# Patient Record
Sex: FEMALE | Race: WHITE | NOT HISPANIC OR LATINO | Employment: OTHER | URBAN - METROPOLITAN AREA
[De-identification: names, ages, dates, MRNs, and addresses within clinical notes are randomized per-mention and may not be internally consistent; named-entity substitution may affect disease eponyms.]

---

## 2022-05-31 ENCOUNTER — HOSPITAL ENCOUNTER (OUTPATIENT)
Dept: GENERAL RADIOLOGY | Age: 67
Discharge: HOME | End: 2022-05-31
Attending: ANESTHESIOLOGY

## 2022-05-31 DIAGNOSIS — G89.29 ANKLE PAIN, CHRONIC: ICD-10-CM

## 2022-05-31 DIAGNOSIS — M25.579 ANKLE PAIN, CHRONIC: ICD-10-CM

## 2022-05-31 PROCEDURE — 73630 X-RAY EXAM OF FOOT: CPT

## 2022-05-31 PROCEDURE — 73610 X-RAY EXAM OF ANKLE: CPT

## 2024-11-19 NOTE — PROGRESS NOTES
FAMILY MEDICINE CLINIC NOTE    HPI  Mandi Moran is a 69 year old female presenting for     #DM  -Hba1c: Indicated  -Microalbuminuria: Indicated  -B12:   -Pneumonia vaccine: Defers for now  -HepB:  -Eye exam: Will need - will set up in future  -Diabetic foot exam: 11/20/24 Bilateral barefoot skin diabetic exam completed. Visualized feet and the appearance is normal. Bilateral monofilament/sensation of both feet is decreased throughout the bilateral feet except in the left 3rd, 4th and 5th digits. Pulsation pedal pulse exam of both lower legs/feet is normal.  -Current medications:metformin 1000 mg twice daily, basaglar 10 U nightly  -Blood sugars:  -AM:      -Noon:   -PM:      -hypoglycemia:    #Diabetic neuropathy  -gabapentin 800 mg in the morning 400 mg in the afternoon and 400 mg at night     #HLD  #Hypertriglyceridemia   -fenofibrate 145 mg daily  -atorvastatin - had achiness, not taking  -will switch to rosuvastatin    #HTN  -amlodipine 5 mg nightly     #Gout   -allopurinol 200 mg in the morning, 100 mg at night    #Hip pain  -1 week ago  -reports left posterior back  -pain radiates down the left lower extremity  -has had arthritis in the spine   -interested in physical therapy     #Abdominal discomfort  -pantoprazole 40 mg  -has seen GI for EGD and colonoscopy  -pt reports unremarkable EGD  -pt reports polyps and hemorrhoids for colonoscopy  -no reports available     #COPD  -albuterol 2 puffs every morning   -still smoking    #Cigarette smoker  -1.5 ppd     #CKD  -last CMP 9/2023  -taking meloxicam - advised to discontinue         ROS  GENERAL: No fever/chills, no recent weight loss  HEENT: No visual changes, no changes in hearing, no sore throats  NECK: No pain, no swelling  RESP: No cough, no SOB  CV: No chest pain, no palpitations  GI: No abd pain, no N/V/D  MSK: No edema  SKIN: No new rashes  NEURO: No numbness, no tingling, no headaches    HEALTH MAINTENANCE  Health Maintenance Topics with due status:  Overdue       Topic Date Due    Colorectal Cancer Screening Never done    Mammogram Never done    Zoster Vaccines Never done    DEXA Scan Never done    Pneumococcal Vaccine: 65+ Years Never done    MA Annual Health Assessment Never done    Annual Depression Screening Never done    Fall Risk Screening (Annual) Never done    COVID-19 Vaccine 09/01/2024    Influenza Vaccine Never done       ALLERGIES  Allergies[1]    MEDICATIONS  Current Outpatient Medications   Medication Sig Dispense Refill    pantoprazole 40 MG Oral Tab EC TAKE 1 TABLET BY MOUTH EVERY MORNING BEFORE BREAKFAST FOR 90 DAYS      metFORMIN 500 MG Oral Tab 2 tablets (1,000 mg total).      Magnesium Gluconate 250 MG Oral Tab 250 mg.      allopurinol 100 MG Oral Tab TAKE 2 TABLETS BY MOUTH EVERY MORNING AND 1 TABLET EVERY EVENING      amLODIPine 5 MG Oral Tab 1 tablet (5 mg total).      fenofibrate 145 MG Oral Tab Take 1 tablet (145 mg total) by mouth daily.      insulin glargine (BASAGLAR KWIKPEN) 100 UNIT/ML Subcutaneous Solution Pen-injector 10 Units.      Cholecalciferol (VITAMIN D3) 250 MCG (95866 UT) Oral Cap 250 mcg.      Ascorbic Acid (VITAMIN C) 500 MG Oral Cap 500 mg.      B Complex Vitamins (PA B-COMPLEX WITH B-12 OR) PO, Daily, 0 Refill(s)      albuterol 108 (90 Base) MCG/ACT Inhalation Aero Soln every 28 days. FOR 21 DAYS IN, THEN REMOVE FOR 7 DAYS      acetaminophen (TYLENOL EXTRA STRENGTH) 500 MG Oral Tab 2 tablets (1,000 mg total).      rosuvastatin 5 MG Oral Tab Take 1 tablet (5 mg total) by mouth nightly. 90 tablet 1    APPLE CIDER VINEGAR OR Take 450 mg by mouth.      fluticasone-salmeterol (ADVAIR DISKUS) 250-50 MCG/ACT Inhalation Aerosol Powder, Breath Activated Inhale 1 puff into the lungs 2 (two) times daily. 60 each 3    gabapentin 400 MG Oral Cap Gabapentin 800 mg in the morning, 400 mg in the afternoon, 400 mg at night 360 capsule 3       ACTIVE PROBLEMS  Patient Active Problem List   Diagnosis    Abdominal discomfort    Type 2  diabetes mellitus with stage 3a chronic kidney disease, without long-term current use of insulin (HCC)    Hyperlipidemia    Primary hypertension    Stage 3a chronic kidney disease (HCC)    Chronic obstructive pulmonary disease (HCC)    Chronic gout without tophus    Diabetic polyneuropathy associated with type 2 diabetes mellitus (HCC)    Pain of left hip    Cigarette smoker    Health maintenance examination       PAST MEDICAL HISTORY  Past Medical History:    Arthritis    COPD (chronic obstructive pulmonary disease) (HCC)    Diabetes (HCC)    Essential hypertension    Gout    Hyperlipidemia       PAST SOCIAL HISTORY  Social History     Socioeconomic History    Marital status:      Spouse name: Not on file    Number of children: Not on file    Years of education: Not on file    Highest education level: Not on file   Occupational History    Not on file   Tobacco Use    Smoking status: Every Day     Current packs/day: 1.50     Average packs/day: 1 pack/day for 58.9 years (61.3 ttl pk-yrs)     Types: Cigarettes     Start date: 1966    Smokeless tobacco: Current   Vaping Use    Vaping status: Some Days    Substances: Nicotine   Substance and Sexual Activity    Alcohol use: Yes     Comment: A couple times a year    Drug use: Never    Sexual activity: Not Currently     Partners: Male   Other Topics Concern    Not on file   Social History Narrative    Relationships: . Brother - Aneesh Bullard    Children:     Pets:     School:     Work:     Origin:     Interests:     Spiritual:          Social Drivers of Health     Financial Resource Strain: Not on File (8/2/2024)    Received from [facility]    Financial Resource Strain     Financial Resource Strain: 0   Food Insecurity: Not at Risk (8/2/2024)    Received from [facility]    Food Insecurity     Food: 1   Transportation Needs: Not at Risk (8/2/2024)    Received from [facility]    Transportation Needs     Transportation: 1   Physical Activity: Not on File (10/6/2022)     Received from [facility]    Physical Activity     Physical Activity: 0   Stress: Not on File (10/6/2022)    Received from [facility]    Stress     Stress: 0   Social Connections: Not on File (2024)    Received from [facility]    Social Connections     Connectedness: 0   Housing Stability: Not at Risk (2024)    Received from [facility]    Housing Stability     Housin       PAST SURGICAL HISTORY  Past Surgical History:   Procedure Laterality Date          Cholecystectomy         PAST FAMILY HISTORY  Family History   Problem Relation Age of Onset    Skin cancer Mother     Depression Mother     Diabetes Mother     Cancer Father     Suicide History Father     Skin cancer Sister     Depression Brother     Cataracts Brother     No Known Problems Maternal Grandmother     Kidney Disease Maternal Grandfather     No Known Problems Paternal Grandmother     No Known Problems Paternal Grandfather     Breast Cancer Neg     Colon Cancer Neg          PHYSICAL EXAM  Vitals:    24 1403   BP: 128/76   Pulse: 95   Temp: 98.2 °F (36.8 °C)   SpO2: 95%   Weight: 190 lb (86.2 kg)   Height: 5' 5.5\" (1.664 m)      Body mass index is 31.14 kg/m².    GENERAL: NAD  NECK: Supple, non-tender  RESP: Non-labored respirations, CTAB, no wheezing, no rales, no rhonchi  CV: RRR, no murmurs  GI: Soft, non-distended, non-tender, no guarding, no rebound, no masses  MSK: No edema.  No significant C, T, L-spine tenderness.  Patient with mild tenderness palpation in the left sacroiliac region of her back.  Mild tenderness palpation to the posterior left hip.  FADIR RAMSES negative bilaterally.  5 out of 5 strength flexion extension of the bilateral hips.  Bilateral barefoot skin diabetic exam completed. Visualized feet and the appearance is normal. Bilateral monofilament/sensation of both feet is decreased throughout the bilateral feet except in the left 3rd, 4th and 5th digits. Pulsation pedal pulse exam of both lower legs/feet is normal.  SKIN: Warm  and dry, no rashes  NEURO: Answering questions appropriately    LABS  No results found for: \"WBC\", \"HGB\", \"HCT\", \"PLT\", \"NEPERCENT\", \"LYPERCENT\", \"MOPERCENT\", \"EOPERCENT\", \"BAPERCENT\", \"NE\", \"LYMABS\", \"MOABSO\", \"EOABSO\", \"BAABSO\", \"REITCPERCENT\"    No results found for: \"NA\", \"K\", \"CL\", \"CO2\", \"ANIONGAP\", \"BUN\", \"CREATSERUM\", \"BUNCREA\", \"GLU\", \"CA\", \"OSMOCALC\", \"GFRNAA\", \"GFRAA\", \"ALT\", \"AST\", \"ALKPHO\", \"BILT\", \"TP\", \"ALB\", \"GLOBULIN\", \"ELECTAG\", \"FASTING\"      No results found for: \"CHOLEST\", \"TRIG\", \"HDL\", \"LDL\", \"VLDL\", \"TCHDLRATIO\", \"NONHDLC\", \"CHOLHDLRATIO\", \"CALCNONHDL\"     DIAGNOSTICS      ASSESSMENT/PLAN  Problem List Items Addressed This Visit          HCC Problems    Chronic obstructive pulmonary disease (HCC)     Patient with COPD.  She uses albuterol on a regular basis  Will start Advair 250-50 mcg 1 puff twice daily.  Use albuterol as needed.  Defers on Prevnar 20 vaccine.         Relevant Medications    albuterol 108 (90 Base) MCG/ACT Inhalation Aero Soln    fluticasone-salmeterol (ADVAIR DISKUS) 250-50 MCG/ACT Inhalation Aerosol Powder, Breath Activated    Diabetic polyneuropathy associated with type 2 diabetes mellitus (HCC)     Patient with diabetic neuropathy  Currently using gabapentin 100 mg in the morning, 40 mg the afternoon and 4 mg at night.  Needs to maintain diabetic control         Relevant Medications    metFORMIN 500 MG Oral Tab    insulin glargine (BASAGLAR KWIKPEN) 100 UNIT/ML Subcutaneous Solution Pen-injector    gabapentin 400 MG Oral Cap    Stage 3a chronic kidney disease (HCC)     Patient with chronic kidney disease.  Strongly advised avoiding nephrotoxic medications including oral NSAIDs.  Will discontinue chronic meloxicam at this time.  Stay well-hydrated.  Monitor CMP.    Tylenol as needed for pain.           Relevant Medications    Magnesium Gluconate 250 MG Oral Tab    allopurinol 100 MG Oral Tab    amLODIPine 5 MG Oral Tab    fenofibrate 145 MG Oral Tab    Cholecalciferol  (VITAMIN D3) 250 MCG (87483 UT) Oral Cap    rosuvastatin 5 MG Oral Tab    Type 2 diabetes mellitus with stage 3a chronic kidney disease, without long-term current use of insulin (HCC) - Primary     Patient with diabetes with complications of chronic kidney disease, diabetic neuropathy.  Goal A1c less than 7  Currently on metformin 1000 mg twice daily and Basaglar 10 units nightly  Monitor blood sugars at home.  Bring in blood sugar log to next office visit.  Monitor labs including hemoglobin A1c, CMP, urine microalbumin.  Declines Prevnar vaccine today  Follow-up with me in 2 weeks to reassess         Relevant Medications    metFORMIN 500 MG Oral Tab    fenofibrate 145 MG Oral Tab    insulin glargine (BASAGLAR KWIKPEN) 100 UNIT/ML Subcutaneous Solution Pen-injector    rosuvastatin 5 MG Oral Tab    Other Relevant Orders    Microalb/Creat Ratio, Random Urine    Vitamin B12    Comp Metabolic Panel (14)    HCV Antibody    Hemoglobin A1C       Cardiac and Vasculature    Hyperlipidemia     Patient with hyperlipidemia with hypertriglyceridemia.  She notes that she did not tolerate atorvastatin in the past due to myalgias  She is unsure if she was tried on rosuvastatin in the past.  Will prescribe rosuvastatin 5 mg nightly  She finds that she has tried this in the past, can switch to pravastatin.  Can continue fenofibrate 145 mg daily         Relevant Medications    metFORMIN 500 MG Oral Tab    fenofibrate 145 MG Oral Tab    insulin glargine (BASAGLAR KWIKPEN) 100 UNIT/ML Subcutaneous Solution Pen-injector    rosuvastatin 5 MG Oral Tab    Other Relevant Orders    Comp Metabolic Panel (14)    Lipid Panel    Primary hypertension     Patient with hypertension.  Blood pressures are controlled in the office.  Continue amlodipine 5 mg nightly         Relevant Medications    Magnesium Gluconate 250 MG Oral Tab    amLODIPine 5 MG Oral Tab       Gastrointestinal and Abdominal    Abdominal discomfort     Patient with a history  abdominal discomfort.  She has seen GI in the past for evaluation and states that she has had an EGD and colonoscopy completed.  Advise getting me the name of the GI specialist so that I can request records  Currently she is taking pantoprazole 40 mg daily.  Advise discontinuation of oral NSAIDs as this can be a source of discomfort.  Pending record results, consider referral back to GI if needed.         Relevant Medications    pantoprazole 40 MG Oral Tab EC    B Complex Vitamins (PA B-COMPLEX WITH B-12 OR)       Musculoskeletal and Injuries    Chronic gout without tophus     Patient reports that she has a history of chronic gout.  Currently on allopurinol 200 mg in the morning and 100 mg at night  Check uric acid level.  Need to be careful with adjustments in allopurinol given history of chronic kidney disease         Relevant Medications    allopurinol 100 MG Oral Tab    Cholecalciferol (VITAMIN D3) 250 MCG (07510 UT) Oral Cap    acetaminophen (TYLENOL EXTRA STRENGTH) 500 MG Oral Tab    gabapentin 400 MG Oral Cap    Other Relevant Orders    Uric Acid    Pain of left hip     Patient with chronic intermittent left hip pain.  Recently pain worsened 1 week ago.  She notes radicular symptoms down her left lower extremity  Referral to physical therapy provided         Relevant Medications    allopurinol 100 MG Oral Tab    Cholecalciferol (VITAMIN D3) 250 MCG (93827 UT) Oral Cap    acetaminophen (TYLENOL EXTRA STRENGTH) 500 MG Oral Tab    gabapentin 400 MG Oral Cap    Other Relevant Orders    Physical Therapy Referral - [facility]       Tobacco    Cigarette smoker     Smoking cessation advised.            Health Encounters    Health maintenance examination     Check labs.  Go over blood test during next office visit  Follow-up in 2 weeks         Relevant Orders    CBC With Differential With Platelet    Comp Metabolic Panel (14)    HCV Antibody    Hemoglobin A1C    Lipid Panel       Return in about 2 weeks  (around 2024) for medicare visit.    No topic due editable text     Johnny Westfall MD  Family Medicine           Pre-chartin minutes  Reviewing/obtaining: 10 minutes  Medical Exam:1 minutes  Counseling/education: 5 minutes  Notes: 5 minutes  Referring/communicatin minutes  Care coordination: 0 minutes    My total time spent caring for the patient on the day of the encounter: 23 minutes         [1]   Allergies  Allergen Reactions    Atorvastatin OTHER (SEE COMMENTS)     Muscle aches

## 2024-11-20 ENCOUNTER — OFFICE VISIT (OUTPATIENT)
Dept: INTERNAL MEDICINE CLINIC | Facility: CLINIC | Age: 69
End: 2024-11-20
Payer: MEDICARE

## 2024-11-20 VITALS
WEIGHT: 190 LBS | BODY MASS INDEX: 31.27 KG/M2 | OXYGEN SATURATION: 95 % | SYSTOLIC BLOOD PRESSURE: 128 MMHG | TEMPERATURE: 98 F | HEART RATE: 95 BPM | DIASTOLIC BLOOD PRESSURE: 76 MMHG | HEIGHT: 65.5 IN

## 2024-11-20 DIAGNOSIS — J44.9 CHRONIC OBSTRUCTIVE PULMONARY DISEASE, UNSPECIFIED COPD TYPE (HCC): ICD-10-CM

## 2024-11-20 DIAGNOSIS — E11.22 TYPE 2 DIABETES MELLITUS WITH STAGE 3A CHRONIC KIDNEY DISEASE, WITHOUT LONG-TERM CURRENT USE OF INSULIN (HCC): Primary | ICD-10-CM

## 2024-11-20 DIAGNOSIS — N18.31 STAGE 3A CHRONIC KIDNEY DISEASE (HCC): ICD-10-CM

## 2024-11-20 DIAGNOSIS — F17.210 CIGARETTE SMOKER: ICD-10-CM

## 2024-11-20 DIAGNOSIS — Z00.00 HEALTH MAINTENANCE EXAMINATION: ICD-10-CM

## 2024-11-20 DIAGNOSIS — G62.9 POLYNEUROPATHY: ICD-10-CM

## 2024-11-20 DIAGNOSIS — M1A.9XX0 CHRONIC GOUT WITHOUT TOPHUS, UNSPECIFIED CAUSE, UNSPECIFIED SITE: ICD-10-CM

## 2024-11-20 DIAGNOSIS — N18.31 TYPE 2 DIABETES MELLITUS WITH STAGE 3A CHRONIC KIDNEY DISEASE, WITHOUT LONG-TERM CURRENT USE OF INSULIN (HCC): Primary | ICD-10-CM

## 2024-11-20 DIAGNOSIS — R10.9 ABDOMINAL DISCOMFORT: ICD-10-CM

## 2024-11-20 DIAGNOSIS — E78.5 HYPERLIPIDEMIA, UNSPECIFIED HYPERLIPIDEMIA TYPE: ICD-10-CM

## 2024-11-20 DIAGNOSIS — I10 PRIMARY HYPERTENSION: ICD-10-CM

## 2024-11-20 DIAGNOSIS — M25.552 PAIN OF LEFT HIP: ICD-10-CM

## 2024-11-20 DIAGNOSIS — E11.42 DIABETIC POLYNEUROPATHY ASSOCIATED WITH TYPE 2 DIABETES MELLITUS (HCC): ICD-10-CM

## 2024-11-20 PROCEDURE — 99204 OFFICE O/P NEW MOD 45 MIN: CPT | Performed by: FAMILY MEDICINE

## 2024-11-20 RX ORDER — MULTIVIT-MIN/IRON/FOLIC ACID/K 18-600-40
500 CAPSULE ORAL
COMMUNITY
Start: 2024-06-26

## 2024-11-20 RX ORDER — FENOFIBRATE 145 MG/1
145 TABLET, COATED ORAL DAILY
COMMUNITY

## 2024-11-20 RX ORDER — ROSUVASTATIN CALCIUM 5 MG/1
5 TABLET, COATED ORAL NIGHTLY
Qty: 90 TABLET | Refills: 1 | Status: SHIPPED | OUTPATIENT
Start: 2024-11-20

## 2024-11-20 RX ORDER — ACETAMINOPHEN 500 MG
1000 TABLET ORAL
COMMUNITY
Start: 2024-06-26

## 2024-11-20 RX ORDER — FLUTICASONE PROPIONATE AND SALMETEROL 250; 50 UG/1; UG/1
1 POWDER RESPIRATORY (INHALATION) 2 TIMES DAILY
Qty: 60 EACH | Refills: 3 | Status: SHIPPED | OUTPATIENT
Start: 2024-11-20

## 2024-11-20 RX ORDER — AMLODIPINE BESYLATE 5 MG/1
5 TABLET ORAL
COMMUNITY
Start: 2024-06-26

## 2024-11-20 RX ORDER — INSULIN GLARGINE 100 [IU]/ML
10 INJECTION, SOLUTION SUBCUTANEOUS
COMMUNITY
Start: 2024-06-26

## 2024-11-20 RX ORDER — PANTOPRAZOLE SODIUM 40 MG/1
TABLET, DELAYED RELEASE ORAL
COMMUNITY

## 2024-11-20 RX ORDER — ALBUTEROL SULFATE 90 UG/1
INHALANT RESPIRATORY (INHALATION)
COMMUNITY

## 2024-11-20 RX ORDER — ALLOPURINOL 100 MG/1
TABLET ORAL
COMMUNITY

## 2024-11-20 RX ORDER — MULTIVIT,TX WITH IRON,MINERALS
250 TABLET, EXTENDED RELEASE ORAL
COMMUNITY
Start: 2024-06-26

## 2024-11-20 RX ORDER — MELOXICAM 15 MG/1
15 TABLET ORAL DAILY
COMMUNITY
Start: 2024-06-26 | End: 2024-11-20

## 2024-11-20 RX ORDER — GABAPENTIN 400 MG/1
CAPSULE ORAL
COMMUNITY
Start: 2024-06-26 | End: 2024-11-20

## 2024-11-20 RX ORDER — GABAPENTIN 400 MG/1
CAPSULE ORAL
Qty: 360 CAPSULE | Refills: 3 | Status: SHIPPED | OUTPATIENT
Start: 2024-11-20

## 2024-11-20 NOTE — ASSESSMENT & PLAN NOTE
Patient reports that she has a history of chronic gout.  Currently on allopurinol 200 mg in the morning and 100 mg at night  Check uric acid level.  Need to be careful with adjustments in allopurinol given history of chronic kidney disease

## 2024-11-20 NOTE — ASSESSMENT & PLAN NOTE
Patient with diabetic neuropathy  Currently using gabapentin 100 mg in the morning, 40 mg the afternoon and 4 mg at night.  Needs to maintain diabetic control

## 2024-11-20 NOTE — ASSESSMENT & PLAN NOTE
Patient with hypertension.  Blood pressures are controlled in the office.  Continue amlodipine 5 mg nightly

## 2024-11-20 NOTE — ASSESSMENT & PLAN NOTE
Patient with a history abdominal discomfort.  She has seen GI in the past for evaluation and states that she has had an EGD and colonoscopy completed.  Advise getting me the name of the GI specialist so that I can request records  Currently she is taking pantoprazole 40 mg daily.  Advise discontinuation of oral NSAIDs as this can be a source of discomfort.  Pending record results, consider referral back to GI if needed.

## 2024-11-20 NOTE — ASSESSMENT & PLAN NOTE
Patient with hyperlipidemia with hypertriglyceridemia.  She notes that she did not tolerate atorvastatin in the past due to myalgias  She is unsure if she was tried on rosuvastatin in the past.  Will prescribe rosuvastatin 5 mg nightly  She finds that she has tried this in the past, can switch to pravastatin.  Can continue fenofibrate 145 mg daily

## 2024-11-20 NOTE — PATIENT INSTRUCTIONS
PATIENT INSTRUCTIONS    Thank you for seeing me today, it was a pleasure taking care of you.  Please check out at the  and schedule a follow up appointment.  Return in about 2 weeks (around 12/4/2024) for medicare visit.  Please remember that the preferred luis m period for appointments is 5 minutes. This is to help maximize the amount of time that we can spend together at our visits.    Start rosuvastatin 5 mg night   Monitor your blood sugars at home  Bring in a blood sugar log for me   Please get me the name and contact information of the GI specialist who did your EGD and colonoscopy. Give me the name of GI specialist you were seeing  No further oral NSAIDs - no advil, no aleve, no ibuprofen, no naproxen, no meloxicam, no indomethacin  Tylenol as needed for pain 500 every every 6 hours as needed  Start Advair for your COPD   Albuterol ideally only as needed   Referral to physical therapy for your hip     Best,  Dr. Westfall

## 2024-11-20 NOTE — ASSESSMENT & PLAN NOTE
Patient with COPD.  She uses albuterol on a regular basis  Will start Advair 250-50 mcg 1 puff twice daily.  Use albuterol as needed.  Defers on Prevnar 20 vaccine.

## 2024-11-20 NOTE — ASSESSMENT & PLAN NOTE
Patient with chronic kidney disease.  Strongly advised avoiding nephrotoxic medications including oral NSAIDs.  Will discontinue chronic meloxicam at this time.  Stay well-hydrated.  Monitor CMP.    Tylenol as needed for pain.

## 2024-11-20 NOTE — ASSESSMENT & PLAN NOTE
Patient with diabetes with complications of chronic kidney disease, diabetic neuropathy.  Goal A1c less than 7  Currently on metformin 1000 mg twice daily and Basaglar 10 units nightly  Monitor blood sugars at home.  Bring in blood sugar log to next office visit.  Monitor labs including hemoglobin A1c, CMP, urine microalbumin.  Declines Prevnar vaccine today  Follow-up with me in 2 weeks to reassess

## 2024-12-01 ENCOUNTER — HOSPITAL ENCOUNTER (EMERGENCY)
Facility: HOSPITAL | Age: 69
Discharge: HOME | End: 2024-12-01
Attending: EMERGENCY MEDICINE
Payer: MEDICARE

## 2024-12-01 VITALS
SYSTOLIC BLOOD PRESSURE: 130 MMHG | DIASTOLIC BLOOD PRESSURE: 91 MMHG | RESPIRATION RATE: 12 BRPM | BODY MASS INDEX: 31.65 KG/M2 | HEART RATE: 66 BPM | TEMPERATURE: 97 F | WEIGHT: 190 LBS | OXYGEN SATURATION: 93 % | HEIGHT: 65 IN

## 2024-12-01 DIAGNOSIS — M54.32 SCIATICA OF LEFT SIDE: ICD-10-CM

## 2024-12-01 DIAGNOSIS — M54.50 BACK PAIN, LUMBOSACRAL: Primary | ICD-10-CM

## 2024-12-01 PROCEDURE — 99283 EMERGENCY DEPT VISIT LOW MDM: CPT

## 2024-12-01 RX ORDER — DIAZEPAM 5 MG/1
5 TABLET ORAL 3 TIMES DAILY PRN
Qty: 20 TABLET | Refills: 0 | Status: SHIPPED | OUTPATIENT
Start: 2024-12-01 | End: 2024-12-08

## 2024-12-01 RX ORDER — HYDROCODONE BITARTRATE AND ACETAMINOPHEN 5; 325 MG/1; MG/1
1 TABLET ORAL ONCE
Status: COMPLETED | OUTPATIENT
Start: 2024-12-01 | End: 2024-12-01

## 2024-12-01 RX ORDER — DIAZEPAM 5 MG/1
5 TABLET ORAL ONCE
Status: COMPLETED | OUTPATIENT
Start: 2024-12-01 | End: 2024-12-01

## 2024-12-01 RX ORDER — HYDROCODONE BITARTRATE AND ACETAMINOPHEN 5; 325 MG/1; MG/1
1-2 TABLET ORAL EVERY 6 HOURS PRN
Qty: 20 TABLET | Refills: 0 | Status: SHIPPED | OUTPATIENT
Start: 2024-12-01 | End: 2024-12-06

## 2024-12-01 RX ORDER — LIDOCAINE 50 MG/G
1 PATCH TOPICAL EVERY 24 HOURS
Qty: 10 PATCH | Refills: 0 | Status: SHIPPED | OUTPATIENT
Start: 2024-12-01

## 2024-12-01 NOTE — ED PROVIDER NOTES
Patient Seen in: [facility] Emergency Department    History     Chief Complaint   Patient presents with    Back Pain       HPI    69-year-old female with a history of hypertension, diabetes, COPD who presents ER today complaining of left-sided sciatica been on and off for the past 2 weeks saw her primary care provider told her it was sciatica.  Patient got worse today.  Denies any injury or trauma to the back.  Trouble urinating or having bowel movements    History reviewed.   Past Medical History:    Arthritis    COPD (chronic obstructive pulmonary disease) (HCC)    Diabetes (HCC)    Essential hypertension    Gout    Hyperlipidemia       History reviewed.   Past Surgical History:   Procedure Laterality Date          Cholecystectomy           Medications :  Prescriptions Prior to Admission[1]     Family History   Problem Relation Age of Onset    Skin cancer Mother     Depression Mother     Diabetes Mother     Cancer Father     Suicide History Father     Skin cancer Sister     Depression Brother     Cataracts Brother     No Known Problems Maternal Grandmother     Kidney Disease Maternal Grandfather     No Known Problems Paternal Grandmother     No Known Problems Paternal Grandfather     Breast Cancer Neg     Colon Cancer Neg        Smoking Status:   Social History     Socioeconomic History    Marital status:    Tobacco Use    Smoking status: Every Day     Current packs/day: 1.50     Average packs/day: 1 pack/day for 58.9 years (61.4 ttl pk-yrs)     Types: Cigarettes     Start date:     Smokeless tobacco: Current   Vaping Use    Vaping status: Some Days    Substances: Nicotine   Substance and Sexual Activity    Alcohol use: Yes     Comment: A couple times a year    Drug use: Never    Sexual activity: Not Currently     Partners: Male       Constitutional and vital signs reviewed.      Social History and Family History elements reviewed from today, pertinent positives to the presenting  problem noted.    Physical Exam     ED Triage Vitals   BP 12/01/24 0601 (!) 118/98   Pulse 12/01/24 0600 78   Resp 12/01/24 0600 18   Temp 12/01/24 0600 97.1 °F (36.2 °C)   Temp src 12/01/24 0600 Temporal   SpO2 12/01/24 0600 96 %   O2 Device 12/01/24 0600 None (Room air)       All measures to prevent infection transmission during my interaction with the patient were taken. Handwashing was performed prior to and after the exam.  Stethoscope and any equipment used during my examination was cleaned with super sani-cloth germicidal wipes following the exam.     Physical Exam  Vitals and nursing note reviewed.   Cardiovascular:      Rate and Rhythm: Normal rate.      Pulses: Normal pulses.   Pulmonary:      Effort: Pulmonary effort is normal.   Abdominal:      Palpations: Abdomen is soft.   Musculoskeletal:         General: Normal range of motion.      Comments: Reproducible left lower lateral paraspinal lumbar pain with palpation.  Positive straight leg test on the left.  No midline spinal pain   Skin:     General: Skin is warm and dry.   Neurological:      General: No focal deficit present.      Mental Status: She is alert.         ED Course      Labs Reviewed - No data to display    As Interpreted by me    Imaging Results Available and Reviewed while in ED: No results found.  ED Medications Administered:   Medications   lidocaine-menthol 4-1 % patch 1 patch (has no administration in time range)   HYDROcodone-acetaminophen (Norco) 5-325 MG per tab 1 tablet (1 tablet Oral Given 12/1/24 0610)   diazePAM (Valium) tab 5 mg (5 mg Oral Given 12/1/24 0610)         MDM     Vitals:    12/01/24 0600 12/01/24 0601   BP:  (!) 118/98   Pulse: 78    Resp: 18    Temp: 97.1 °F (36.2 °C)    TempSrc: Temporal    SpO2: 96%    Weight: 86.2 kg    Height: 165.1 cm (5' 5\")      *I personally reviewed and interpreted all ED vitals.    Pulse Ox: 96%, Room air, Normal       Differential Diagnosis/ Diagnostic Considerations: musculoskeletal  pain, back spasm,sciatica    Complicating Factors: The patient already has does not have any pertinent problems on file. to contribute to the complexity of this ED evaluation.    Medical Decision Making  Risk  OTC drugs.  Prescription drug management.      Patient's pain improved after pain medication and muscle relaxers given.  Explained the patient this is all muscle spasm of the sciatica.  Will prescribe her same medication for home along with lidocaine patches.  Patient understands to follow-up with her primary care provider in 1 to 2 days.  Return to the ER if symptoms continue, get worse, unable to follow-up  Condition upon leaving the department: Stable    Disposition and Plan     Clinical Impression:  1. Back pain, lumbosacral    2. Sciatica of left side        Disposition:  Discharge    Follow-up:  Amy Johnson MD  [address] [facility] [address] [phone]    Follow up        Medications Prescribed:  Current Discharge Medication List        START taking these medications    Details   HYDROcodone-acetaminophen 5-325 MG Oral Tab Take 1-2 tablets by mouth every 6 (six) hours as needed for Pain.  Qty: 20 tablet, Refills: 0    Associated Diagnoses: Back pain, lumbosacral      diazePAM 5 MG Oral Tab Take 1 tablet (5 mg total) by mouth 3 (three) times daily as needed (muscle spasms).  Qty: 20 tablet, Refills: 0    Associated Diagnoses: Back pain, lumbosacral      lidocaine 5 % External Patch Place 1 patch onto the skin daily.  Qty: 10 patch, Refills: 0    Associated Diagnoses: Back pain, lumbosacral      Naloxone HCl 4 MG/0.1ML Nasal Liquid 4 mg by Intranasal route as needed (May repeat as needed in other nostril if symptoms persist). If patient remains unresponsive, repeat dose in other nostril 2-5 minutes after first dose.  Qty: 1 kit, Refills: 0    Associated Diagnoses: Back pain, lumbosacral                              [1] (Not in a hospital admission)

## 2024-12-01 NOTE — DISCHARGE INSTRUCTIONS
Take medication as prescribed.  Follow-up with your primary care provider in 1 to 2 days.  Return to the ER if symptoms continue, get worse, unable to follow-up

## 2024-12-01 NOTE — ED INITIAL ASSESSMENT (HPI)
Pt arrived via [facility] from home. Pt states she is having lower back pain that runs down left leg. Pt states she was just dx w/sciatica. Was to start PT. Took tylenol at 1130. Took an unknown pain killer.

## 2024-12-01 NOTE — ED QUICK NOTES
Patient provided with discharge instructions. Verbalized understanding for plan of care at home and follow up. All question and concerns addressed prior to discharge. Let pt know rx was sent to pharmacy.  Pt verbally abusive toward staff, yelling that we did not treat her pain. Stated that \"you are full of shit, we will be coming back.\" Pt wheeled out in wheel chair.

## 2024-12-03 ENCOUNTER — TELEPHONE (OUTPATIENT)
Dept: INTERNAL MEDICINE CLINIC | Facility: CLINIC | Age: 69
End: 2024-12-03

## 2024-12-03 ENCOUNTER — PATIENT OUTREACH (OUTPATIENT)
Dept: CASE MANAGEMENT | Age: 69
End: 2024-12-03

## 2024-12-03 DIAGNOSIS — Z02.9 ENCOUNTERS FOR UNSPECIFIED ADMINISTRATIVE PURPOSE: Primary | ICD-10-CM

## 2024-12-03 PROCEDURE — 1111F DSCHRG MED/CURRENT MED MERGE: CPT

## 2024-12-03 NOTE — PROGRESS NOTES
Transitions of Care Navigation  Discharge Date: 24  Contact Date: 12/3/2024    Transitions of Care Assessment:  CHAVEZ Initial Assessment    General:  Assessment completed with: Patient  Patient Subjective: NCM spoke with pt states her pain is severe. She states is barely able to go to the bathroom. She is unable to walk 25 steps. She denies any weakness, numbness or tingling. Patient denies any fevers, chills, nausea, vomiting, shortness of breath, chest pain or any others. She states had a chiropractic appointment yesterday, had a heat massage on her left buttock, but only got relief for a short period of time. Patient is having to take 2 Norco at a time to be able to get around. She states first appointment with PT is not until 24. She will call her insurance to find out if she is able to go somewhere else for PT, for a sooner appointment. Pt denies any questions or concerns at this time. She is requesting further recommendations from Dr. Westfall-PCP.  Chief Complaint: Back Pain  Verify patient name and  with patient/ caregiver: Yes    Hospital Stay/Discharge:  Tell me what you understand of why you were in the hospital or emergency department: Pt with c/o of severe left sided sciatica pain, on and off for the past 2 weeks. She followed up with PCP, referred her to PT.  Prior to leaving the hospital were your Discharge Instructions reviewed with you?: Yes  Did you receive a copy of your written Discharge Instructions?: Yes  What questions do you have about your Discharge Instructions?: none  Do you feel better or worse since you left the hospital or emergency department?: Same    Follow - Up Appointment:  Do you have a follow-up appointment?: No  Are there any barriers to getting to your follow-up appointment?: No    Home Health/DME:  Prior to leaving the hospital was Home Health (HH) arranged for you?: N/A  Are HH needs identified by staff during the assessment?: No     Prior to leaving the hospital or  emergency department was Durable Medical Equipment (DME), medical supplies, or infusions arranged for you?: N/A  Are DME/medical supply/infusions needs identified by staff during this assessment?: No     Medications/Diet:  Did any of your medications change, during or after your hospital stay or ED visit?: Yes  Do you have your new or updated medications?: Yes  Do you understand what your medications are for and possible side effects?: Yes  Are there any reasons that keep you from taking your medication as prescribed?: No  Any concerns about medication refills?: No    Were you given a different diet per your Discharge Instructions?: No  Reason: n/a     Questions/Concerns:  Do you have any questions or concerns that have not been discussed?: No   CHAVEZ Follow-up Assessment    General:  Assessment completed with: Patient  Patient Subjective: NCM spoke with pt states her pain is severe. She states is barely able to go to the bathroom. She is unable to walk 25 steps. She denies any weakness, numbness or tingling. Patient denies any fevers, chills, nausea, vomiting, shortness of breath, chest pain or any others. She states had a chiropractic appointment yesterday, had a heat massage on her left buttock, but only got relief for a short period of time. Patient is having to take 2 Norco at a time to be able to get around. She states first appointment with PT is not until 12/26/24. She will call her insurance to find out if she is able to go somewhere else for PT, for a sooner appointment. Pt denies any questions or concerns at this time. She is requesting further recommendations from Dr. Westfall-PCP.  Chief Complaint: Back Pain  Community Resources: Other (none)    Nursing Interventions:  All discharge instructions reviewed with the patient. Reviewed when to call MD vs when to call 911 or go the ED. Educated patient on the importance of taking all meds as prescribed as well as close f/u with PCP/specialists. Pt verbalized  understanding and will contact the office with any further questions or concerns. Patient denies fevers, chills, nausea, vomiting, shortness of breath, chest pain, or any other symptoms at this time.  NCM attempted to schedule HFU, however no availability with PCP. NCM sent TE to PCP office re: assistance in scheduling HFU appt. NCM provided contact information for any further questions/concerns. Patient verbalized understanding and agreeable.           Medications:Reviewed medication list.  Medications are up to date.  Current Outpatient Medications   Medication Sig Dispense Refill    HYDROcodone-acetaminophen 5-325 MG Oral Tab Take 1-2 tablets by mouth every 6 (six) hours as needed for Pain. 20 tablet 0    diazePAM 5 MG Oral Tab Take 1 tablet (5 mg total) by mouth 3 (three) times daily as needed (muscle spasms). 20 tablet 0    lidocaine 5 % External Patch Place 1 patch onto the skin daily. 10 patch 0    Naloxone HCl 4 MG/0.1ML Nasal Liquid 4 mg by Intranasal route as needed (May repeat as needed in other nostril if symptoms persist). If patient remains unresponsive, repeat dose in other nostril 2-5 minutes after first dose. 1 kit 0    pantoprazole 40 MG Oral Tab EC TAKE 1 TABLET BY MOUTH EVERY MORNING BEFORE BREAKFAST FOR 90 DAYS      metFORMIN 500 MG Oral Tab 2 tablets (1,000 mg total).      Magnesium Gluconate 250 MG Oral Tab 250 mg.      allopurinol 100 MG Oral Tab TAKE 2 TABLETS BY MOUTH EVERY MORNING AND 1 TABLET EVERY EVENING      amLODIPine 5 MG Oral Tab 1 tablet (5 mg total).      fenofibrate 145 MG Oral Tab Take 1 tablet (145 mg total) by mouth daily.      insulin glargine (BASAGLAR KWIKPEN) 100 UNIT/ML Subcutaneous Solution Pen-injector 10 Units.      Cholecalciferol (VITAMIN D3) 250 MCG (97015 UT) Oral Cap 250 mcg.      Ascorbic Acid (VITAMIN C) 500 MG Oral Cap 500 mg.      B Complex Vitamins (PA B-COMPLEX WITH B-12 OR) PO, Daily, 0 Refill(s)      albuterol 108 (90 Base) MCG/ACT Inhalation Aero Soln  every 28 days. FOR 21 DAYS IN, THEN REMOVE FOR 7 DAYS      acetaminophen (TYLENOL EXTRA STRENGTH) 500 MG Oral Tab 2 tablets (1,000 mg total).      rosuvastatin 5 MG Oral Tab Take 1 tablet (5 mg total) by mouth nightly. 90 tablet 1    APPLE CIDER VINEGAR OR Take 450 mg by mouth.      fluticasone-salmeterol (ADVAIR DISKUS) 250-50 MCG/ACT Inhalation Aerosol Powder, Breath Activated Inhale 1 puff into the lungs 2 (two) times daily. 60 each 3    gabapentin 400 MG Oral Cap Gabapentin 800 mg in the morning, 400 mg in the afternoon, 400 mg at night 360 capsule 3         Follow-up Appointments:  Your appointments       Date & Time Appointment Department ([place])    Dec 26, 2024 10:00 AM CST Physical Therapy Ortho Evaluation with Nanette Kaba, PT [facility] [place] ([facility] [place]        Dec 31, 2024 12:15 PM CST Physical Therapy Ortho Treatment with Nanette Kaba, PT [facility] [place] ([facility] [place]        Jan 03, 2025 10:45 AM CST Physical Therapy Ortho Treatment with Nanette Kaba, PT [facility] [place] ([facility] [place]        Jan 06, 2025 3:15 PM CST Physical Therapy Ortho Treatment with Nanette Kaba, PT [facility] [place] ([facility] [place])        Jan 08, 2025 3:15 PM CST Physical Therapy Ortho Treatment with Nanette Kaba, PT [facility] [place] ([facility] [place]              [facility] [place] [facility] [place]  [address] [place] [address] [phone]            Transitional Care Clinic  Was TCC Ordered: No  Was TCC Scheduled: No. Explain: pt declined.        Primary Care Provider (If no TCC appointment)  Does patient already have a PCP appointment scheduled? No  Nurse Care Manager Attempted to schedule PCP office ER Follow-up appointment with patient   -If no appointment scheduled: Explain : no availability with  PCP within CHAVEZ timeframe.     Specialist  Does the patient have any other follow-up appointment(s) need to be scheduled? No       [x]  Patient verbally agrees to additional follow-up calls from Nurse Care Manager    Book By Date: 12/8/24

## 2024-12-03 NOTE — TELEPHONE ENCOUNTER
Spoke to patient for Transitions of Care call today.  Patient does not have an appointment scheduled at this time.  ER Follow-up appointment needed by 12/8/24.  Please advise.    BOOK BY DATE: 12/8/24    Clinical staff:  Please follow-up with patient and try to get them to schedule as patient would greatly benefit from ER Follow-up.  Thank you!       NC attempted to schedule appointment with Patient, soonest available 12/12/24. Pt states is in severe pain, unable to get in for PT until 12/26/24 with [facility]. She will call her insurance to find an alternate facility for PT for sooner appointment. Pt would like further recommendations from PCP. Please advise.

## 2024-12-04 LAB
ABSOLUTE BASOPHILS: 29 CELLS/UL (ref 0–200)
ABSOLUTE EOSINOPHILS: 304 CELLS/UL (ref 15–500)
ABSOLUTE LYMPHOCYTES: 2299 CELLS/UL (ref 850–3900)
ABSOLUTE MONOCYTES: 599 CELLS/UL (ref 200–950)
ABSOLUTE NEUTROPHILS: 6270 CELLS/UL (ref 1500–7800)
ALBUMIN/GLOBULIN RATIO: 1.6 (CALC) (ref 1–2.5)
ALBUMIN: 4.7 G/DL (ref 3.6–5.1)
ALKALINE PHOSPHATASE: 60 U/L (ref 37–153)
ALT: 18 U/L (ref 6–29)
AST: 22 U/L (ref 10–35)
BASOPHILS: 0.3 %
BILIRUBIN, TOTAL: 0.3 MG/DL (ref 0.2–1.2)
BUN/CREATININE RATIO: 27 (CALC) (ref 6–22)
BUN: 44 MG/DL (ref 7–25)
CALCIUM: 10.4 MG/DL (ref 8.6–10.4)
CARBON DIOXIDE: 26 MMOL/L (ref 20–32)
CHLORIDE: 106 MMOL/L (ref 98–110)
CHOL/HDLC RATIO: 5.6 (CALC)
CHOLESTEROL, TOTAL: 214 MG/DL
CREATININE, RANDOM URINE: 104 MG/DL (ref 20–275)
CREATININE: 1.66 MG/DL (ref 0.6–1)
EGFR: 33 ML/MIN/1.73M2
EOSINOPHILS: 3.2 %
GLOBULIN: 3 G/DL (CALC) (ref 1.9–3.7)
GLUCOSE: 97 MG/DL (ref 65–99)
HDL CHOLESTEROL: 38 MG/DL
HEMATOCRIT: 40.8 % (ref 35–45)
HEMOGLOBIN A1C: 6.2 % OF TOTAL HGB
HEMOGLOBIN: 13.8 G/DL (ref 11.7–15.5)
LYMPHOCYTES: 24.2 %
MCH: 29.2 PG (ref 27–33)
MCHC: 33.8 G/DL (ref 32–36)
MCV: 86.4 FL (ref 80–100)
MICROALBUMIN/CREATININE RATIO, RANDOM URINE: 9 MG/G CREAT
MICROALBUMIN: 0.9 MG/DL
MONOCYTES: 6.3 %
MPV: 11 FL (ref 7.5–12.5)
NEUTROPHILS: 66 %
NON-HDL CHOLESTEROL: 176 MG/DL (CALC)
PLATELET COUNT: 274 THOUSAND/UL (ref 140–400)
POTASSIUM: 4.7 MMOL/L (ref 3.5–5.3)
PROTEIN, TOTAL: 7.7 G/DL (ref 6.1–8.1)
RDW: 15.4 % (ref 11–15)
RED BLOOD CELL COUNT: 4.72 MILLION/UL (ref 3.8–5.1)
SODIUM: 140 MMOL/L (ref 135–146)
TRIGLYCERIDES: 452 MG/DL
URIC ACID: 5.3 MG/DL (ref 2.5–7)
VITAMIN B12: 606 PG/ML (ref 200–1100)
WHITE BLOOD CELL COUNT: 9.5 THOUSAND/UL (ref 3.8–10.8)

## 2024-12-05 ENCOUNTER — TELEPHONE (OUTPATIENT)
Dept: INTERNAL MEDICINE CLINIC | Facility: CLINIC | Age: 69
End: 2024-12-05

## 2024-12-05 ENCOUNTER — NURSE TRIAGE (OUTPATIENT)
Dept: INTERNAL MEDICINE CLINIC | Facility: CLINIC | Age: 69
End: 2024-12-05

## 2024-12-05 ENCOUNTER — OFFICE VISIT (OUTPATIENT)
Dept: INTERNAL MEDICINE CLINIC | Facility: CLINIC | Age: 69
End: 2024-12-05
Payer: MEDICARE

## 2024-12-05 VITALS
HEIGHT: 65 IN | SYSTOLIC BLOOD PRESSURE: 144 MMHG | BODY MASS INDEX: 32 KG/M2 | OXYGEN SATURATION: 98 % | DIASTOLIC BLOOD PRESSURE: 80 MMHG | HEART RATE: 90 BPM

## 2024-12-05 DIAGNOSIS — G57.02 PIRIFORMIS SYNDROME OF LEFT SIDE: Primary | ICD-10-CM

## 2024-12-05 DIAGNOSIS — M54.42 LEFT-SIDED LOW BACK PAIN WITH LEFT-SIDED SCIATICA, UNSPECIFIED CHRONICITY: ICD-10-CM

## 2024-12-05 PROCEDURE — 99213 OFFICE O/P EST LOW 20 MIN: CPT

## 2024-12-05 RX ORDER — FLURBIPROFEN SODIUM 0.3 MG/ML
1 SOLUTION/ DROPS OPHTHALMIC 2 TIMES DAILY
COMMUNITY
Start: 2024-11-09

## 2024-12-05 RX ORDER — METHYLPREDNISOLONE 4 MG/1
TABLET ORAL
Qty: 1 EACH | Refills: 0 | Status: SHIPPED | OUTPATIENT
Start: 2024-12-05

## 2024-12-05 RX ORDER — LANCETS 33 GAUGE
1 EACH MISCELLANEOUS 2 TIMES DAILY
COMMUNITY
Start: 2024-11-11

## 2024-12-05 NOTE — TELEPHONE ENCOUNTER
Please call patient to schedule a medicare visit to review labs and go over over preventative health.

## 2024-12-05 NOTE — TELEPHONE ENCOUNTER
Future Appointments   Date Time Provider Department Center   12/5/2024  6:00 PM Cynthia Iqbal, APRN [facility] [address]   12/26/2024 10:00 AM Nanette Kaba, PT [facility] [place]   12/31/2024 12:15 PM Nanette Kaba, PT [facility] [place]   1/3/2025 10:45 AM Nanette Kaba, PT [facility] [place]   1/6/2025  3:15 PM Nanette Kaba, PT [facility] [place]   1/8/2025  3:15 PM Nanette Kaba, PT [facility] [place]      Reason for Disposition   SEVERE back pain (e.g., excruciating, unable to do any normal activities) and not improved after pain medicine and CARE ADVICE    Answer Assessment - Initial Assessment Questions  1. ONSET: \"When did the pain begin?\"       11/29/24  2. LOCATION: \"Where does it hurt?\" (upper, mid or lower back)      Lower back   3. SEVERITY: \"How bad is the pain?\"  (e.g., Scale 1-10; mild, moderate, or severe)    - MILD (1-3): Doesn't interfere with normal activities.     - MODERATE (4-7): Interferes with normal activities or awakens from sleep.     - SEVERE (8-10): Excruciating pain, unable to do any normal activities.       12/10  4. PATTERN: \"Is the pain constant?\" (e.g., yes, no; constant, intermittent)       constant  5. RADIATION: \"Does the pain shoot into your legs or somewhere else?\"      Shoot down the left left   6. CAUSE:  \"What do you think is causing the back pain?\"       Sciatic  7. BACK OVERUSE:  \"Any recent lifting of heavy objects, strenuous work or exercise?\"      No  8. MEDICINES: \"What have you taken so far for the pain?\" (e.g., nothing, acetaminophen, NSAIDS)      Tylenol and narco   9. NEUROLOGIC SYMPTOMS: \"Do you have any weakness, numbness, or problems with bowel/bladder control?\"      No  10. OTHER SYMPTOMS: \"Do you have any other symptoms?\" (e.g., fever, abdomen pain, burning with urination, blood in urine)        No   11. PREGNANCY: \"Is there any chance you are pregnant?\" \"When was your last menstrual period?\"         NA    Protocols used: Back Pain-A-[place]

## 2024-12-06 ENCOUNTER — TELEPHONE (OUTPATIENT)
Dept: INTERNAL MEDICINE CLINIC | Facility: CLINIC | Age: 69
End: 2024-12-06

## 2024-12-06 RX ORDER — HYDROCODONE BITARTRATE AND ACETAMINOPHEN 5; 325 MG/1; MG/1
1 TABLET ORAL EVERY 8 HOURS PRN
Qty: 20 TABLET | Refills: 0 | Status: SHIPPED | OUTPATIENT
Start: 2024-12-06

## 2024-12-06 NOTE — TELEPHONE ENCOUNTER
Patient's brother walked in to office to  a referral to address patient's lower back pain from her office visit on 12/05/2024.    No referral was found in the filing cabinet.    If a referral for the lower back pain is necessary, please place in the patients chart and notify the patient when the referral is available to schedule.    Any questions or concerns, please call patient at:    [phone] kg

## 2024-12-06 NOTE — PROGRESS NOTES
Mandi Moran is a 69 year old who presents with   Chief Complaint   Patient presents with    Low Back Pain     Lower back pain, severe pain, couldn't walk.   Seen ER 12/01/2024.   Patient is here with brother, as he was the .  Patient is using a rollator    HPI:  Reports 2 weeks hx of progressive left low back and buttock back pain. Pain is described as sharp and catching. Denies radiation down into nor leg.  No new bowel or bladder incontinence.  No leg weakness.  No numbness or tingling into leg.  10/10.  No recent heavy lifting or strenuous activity.   Was seen in the emergency department on 12/1/2024 and was given: Norco and diazepam    She has started physical therapy this week.  States she delayed starting it because of Thanksgiving holiday until Monday, 12/2/2024    Patient  has long history of smoking.  Clothing smells of cigarette smoke    ROS:  GEN: no fever, no chills, no fatigue  CHEST: no chest pains.  SKIN: no rashes  HEM: no ecchymoses  JOINTS:Denies left knee joint pain and occasionally had left hip pain  NEURO:  no weakness, sometimes pain shoots into buttock or down upper aspect of left lateral thigh  MUSCULOSKELETAL: See HPI      /80   Pulse 90   Ht 5' 5\" (1.651 m)   SpO2 98%   BMI 31.62 kg/m²     PE:  Gen:  Denies fever,  SKIN:no rashes on the chest or back.  Back:  Normal on inspection, no pain on palpation of the lumbar spinal and paraspinal muscles, but rather in the left mid gluteal area.  Not able to get up on exam table for evaluation.  Any ROM increases pain brings on the spasm  Neuro:  Reflexes not able to be elicited due to patient's pain and guarding      Patient is a 69 year old female who presents with   Chief Complaint   Patient presents with    Low Back Pain     Lower back pain, severe pain, couldn't walk.   Seen ER 12/01/2024.       ASSESSMENT:  Encounter Diagnoses   Name Primary?    Piriformis syndrome of left side Yes    Left-sided low back pain with  left-sided sciatica, unspecified chronicity        PLAN: States she will run out of Norco tomorrow.  Instructed we would not be able to fill Norco on 12/5/2024  Requested Prescriptions     Signed Prescriptions Disp Refills    methylPREDNISolone 4 MG Oral Tablet Therapy Pack 1 each 0     Sig: As directed    HYDROcodone-acetaminophen (NORCO) 5-325 MG Oral Tab 20 tablet 0     Sig: Take 1 tablet by mouth every 8 (eight) hours as needed for Pain.   Patient is asking for more Norco and Valium.  Norco can be reordered on 12/6/2024 and Valium can be ordered after that.  Discussed that please would not be eligible for this until tomorrow at the earliest.  Discussed the risk of addiction.  Stressed the importance that she needs to be seen for her annual physical and transfer care to our office with an annual physical.    Patient Instructions   Cut the diazepam pill in half to 2.5 mg, instead of the 5 mg for muscle spasms.  A combination of all pills is too sedating and you are not legible for the next refill for 2 more days.  You may use Tylenol  As mentioned at the appointment, please continue physical therapy that you just started this week, after Thanksgiving. The Physiatry/Physical Medicine referral has been made electronically.  Call [phone] to set this up or you can [facility] through [facility] as a New Patient with one of the [facility] doctors: Dr. Mayi Servin or Dr. Alex Behar.  Currently her doctor is listed as Dr. Amy Russo.  Follow-up with her for your annual physical unless you are transferring care to our office.  Notify your insurance if you are transferring your primary care to one of our physicians.  The Norco was made available this evening at the [facility].     Instructions discussed and patient verbalizes understanding and agrees with the plan

## 2024-12-07 NOTE — TELEPHONE ENCOUNTER
Called patient and informed referral sent in Mychart.   Patient verbalized understanding.   SOFIA ARANGO

## 2024-12-07 NOTE — PATIENT INSTRUCTIONS
Cut the diazepam pill in half to 2.5 mg, instead of the 5 mg for muscle spasms.  A combination of all pills is too sedating and you are not legible for the next refill for 2 more days.  You may use Tylenol  As mentioned at the appointment, please continue physical therapy that you just started this week, after Thanksgiving. The Physiatry/Physical Medicine referral has been made electronically.  Call [phone] to set this up or you can schedule through [facility] as a New Patient with one of the [facility] doctors: Dr. Mayi Servin or Dr. Alex Behar.  Currently her doctor is listed as Dr. Amy Russo.  Follow-up with her for your annual physical unless you are transferring care to our office.  Notify your insurance if you are transferring your primary care to one of our physicians.  The Norco was made available this evening at the [facility].

## 2024-12-13 ENCOUNTER — TELEPHONE (OUTPATIENT)
Dept: INTERNAL MEDICINE CLINIC | Facility: CLINIC | Age: 69
End: 2024-12-13

## 2024-12-13 DIAGNOSIS — M54.42 LEFT-SIDED LOW BACK PAIN WITH LEFT-SIDED SCIATICA, UNSPECIFIED CHRONICITY: ICD-10-CM

## 2024-12-13 DIAGNOSIS — G57.02 PIRIFORMIS SYNDROME OF LEFT SIDE: ICD-10-CM

## 2024-12-13 RX ORDER — HYDROCODONE BITARTRATE AND ACETAMINOPHEN 5; 325 MG/1; MG/1
1 TABLET ORAL EVERY 8 HOURS PRN
Qty: 20 TABLET | Refills: 0 | OUTPATIENT
Start: 2024-12-13

## 2024-12-13 NOTE — TELEPHONE ENCOUNTER
Patient is calling in stating she will run out of medication today. Please call patient to advise.     [facility] #[id number] - [place], [place] - [address] AT [facility] [place], [phone], [phone]   [address] [place] [address]   Phone: [phone] Fax: [phone]   Hours: Open 24 hours

## 2024-12-15 ENCOUNTER — TELEPHONE (OUTPATIENT)
Dept: INTERNAL MEDICINE CLINIC | Facility: CLINIC | Age: 69
End: 2024-12-15

## 2024-12-15 DIAGNOSIS — M54.42 LEFT-SIDED LOW BACK PAIN WITH LEFT-SIDED SCIATICA, UNSPECIFIED CHRONICITY: Primary | ICD-10-CM

## 2024-12-15 DIAGNOSIS — E11.42 DIABETIC POLYNEUROPATHY ASSOCIATED WITH TYPE 2 DIABETES MELLITUS (HCC): ICD-10-CM

## 2024-12-15 RX ORDER — MELOXICAM 15 MG/1
15 TABLET ORAL DAILY PRN
Qty: 30 TABLET | Refills: 0 | Status: SHIPPED | OUTPATIENT
Start: 2024-12-15

## 2024-12-15 RX ORDER — CYCLOBENZAPRINE HCL 10 MG
10 TABLET ORAL 2 TIMES DAILY PRN
Qty: 30 TABLET | Refills: 0 | Status: SHIPPED | OUTPATIENT
Start: 2024-12-15

## 2024-12-16 ENCOUNTER — TELEPHONE (OUTPATIENT)
Dept: INTERNAL MEDICINE CLINIC | Facility: CLINIC | Age: 69
End: 2024-12-16

## 2024-12-16 ENCOUNTER — TELEPHONE (OUTPATIENT)
Dept: PHYSICAL THERAPY | Facility: HOSPITAL | Age: 69
End: 2024-12-16

## 2024-12-17 ENCOUNTER — PATIENT OUTREACH (OUTPATIENT)
Dept: CASE MANAGEMENT | Age: 69
End: 2024-12-17

## 2024-12-17 NOTE — PROGRESS NOTES
Transitions of Care Navigation  Discharge Date: 12/1/24  Contact Date: 12/17/2024    Transitions of Care Assessment:  CHAVEZ Follow-up Assessment    General:  Assessment completed with: Patient  Patient Subjective: NCM spoke with patient states she is still in severe pain. Patient followed up with PCP on 12/5/24. Pt indicates pain medications help. She reports pain at 10/10, she declines going back to ED. Patient is also taking muscle relaxer. She states her PCP referred her to physiatry, she will try to schedule her appointment. Pt has started PT with [facility] in early December. Pt is taking Gabapentin as well. Pt states she is in bed most of the day. Pt denies any fevers, chills, nausea, vomiting, shortness of breath, chest pain or any other symptoms.  Chief Complaint: back pain  Community Resources: Other (none)    Progress/Care Plan:  Is the patient progressing as planned?: No  Barriers: Other (pain is the same)  Care Plan Update: Patient states she is still in severe pain. Patient followed up with PCP on 12/5/24. Pt indicates pain medications help. She reports pain at 10/10, she declines going back to ED. Patient is also taking muscle relaxer. She states her PCP referred her to physiatry, she will try to schedule her appointment. Pt has started PT with [facility] in early December. Pt is taking Gabapentin as well. Pt states she is in bed most of the day. Pt denies any fevers, chills, nausea, vomiting, shortness of breath, chest pain or any other symptoms.  New Care Plan: Pt will schedule appointment with physiatry for further recommendations. she will continue outpatient PT.  Frequency/Follow Up Plan: 14 day follow up     Notes:  Navigator Notes: Patient continues to have severe pain. She will continue to do outpatient PT and follow up with physiatry.     Nursing Interventions:  All discharge instructions reviewed with the patient. Reviewed when to call MD vs when to call 911 or go the ED. Educated patient on the  importance of taking all meds as prescribed as well as close f/u with PCP/specialists. Pt verbalized understanding and will contact the office with any further questions or concerns. Patient denies fevers, chills, nausea, vomiting, shortness of breath, chest pain, or any other symptoms at this time.  NCM provided contact information for any further questions/concerns. Patient verbalized understanding and agreeable.         Medications:Reviewed medication list.  Medications are up to date.  Current Outpatient Medications   Medication Sig Dispense Refill    Meloxicam 15 MG Oral Tab Take 1 tablet (15 mg total) by mouth daily as needed for Pain. Use only few days at a time, not more than a week 30 tablet 0    cyclobenzaprine 10 MG Oral Tab Take 1 tablet (10 mg total) by mouth 2 (two) times daily as needed for Muscle spasms. 30 tablet 0    HYDROcodone-acetaminophen (NORCO) 5-325 MG Oral Tab Take 1 tablet by mouth every 8 (eight) hours as needed for Pain. 20 tablet 0    BD PEN NEEDLE MINI U/F 31G X 5 MM Does not apply Misc 1 kit 2 (two) times daily.      Lancets (ONETOUCH DELICA PLUS LYGRON27K) Does not apply Misc Apply 1 Application topically 2 (two) times daily.      methylPREDNISolone 4 MG Oral Tablet Therapy Pack As directed 1 each 0    lidocaine 5 % External Patch Place 1 patch onto the skin daily. 10 patch 0    Naloxone HCl 4 MG/0.1ML Nasal Liquid 4 mg by Intranasal route as needed (May repeat as needed in other nostril if symptoms persist). If patient remains unresponsive, repeat dose in other nostril 2-5 minutes after first dose. 1 kit 0    pantoprazole 40 MG Oral Tab EC TAKE 1 TABLET BY MOUTH EVERY MORNING BEFORE BREAKFAST FOR 90 DAYS      metFORMIN 500 MG Oral Tab 2 tablets (1,000 mg total).      Magnesium Gluconate 250 MG Oral Tab 250 mg.      allopurinol 100 MG Oral Tab TAKE 2 TABLETS BY MOUTH EVERY MORNING AND 1 TABLET EVERY EVENING      amLODIPine 5 MG Oral Tab 1 tablet (5 mg total).      fenofibrate 145 MG  Oral Tab Take 1 tablet (145 mg total) by mouth daily.      insulin glargine (BASAGLAR KWIKPEN) 100 UNIT/ML Subcutaneous Solution Pen-injector 10 Units.      Cholecalciferol (VITAMIN D3) 250 MCG (34084 UT) Oral Cap 250 mcg.      Ascorbic Acid (VITAMIN C) 500 MG Oral Cap 500 mg.      B Complex Vitamins (PA B-COMPLEX WITH B-12 OR) PO, Daily, 0 Refill(s)      albuterol 108 (90 Base) MCG/ACT Inhalation Aero Soln every 28 days. FOR 21 DAYS IN, THEN REMOVE FOR 7 DAYS      acetaminophen (TYLENOL EXTRA STRENGTH) 500 MG Oral Tab 2 tablets (1,000 mg total).      rosuvastatin 5 MG Oral Tab Take 1 tablet (5 mg total) by mouth nightly. 90 tablet 1    APPLE CIDER VINEGAR OR Take 450 mg by mouth.      fluticasone-salmeterol (ADVAIR DISKUS) 250-50 MCG/ACT Inhalation Aerosol Powder, Breath Activated Inhale 1 puff into the lungs 2 (two) times daily. 60 each 3    gabapentin 400 MG Oral Cap Gabapentin 800 mg in the morning, 400 mg in the afternoon, 400 mg at night 360 capsule 3

## 2024-12-23 ENCOUNTER — TELEPHONE (OUTPATIENT)
Dept: INTERNAL MEDICINE CLINIC | Facility: CLINIC | Age: 69
End: 2024-12-23

## 2024-12-23 DIAGNOSIS — M54.9 ACUTE BACK PAIN, UNSPECIFIED BACK LOCATION, UNSPECIFIED BACK PAIN LATERALITY: Primary | ICD-10-CM

## 2024-12-23 NOTE — TELEPHONE ENCOUNTER
Patient called and left a voicemail to verify that the Physiatry referral is approved by her insurance.

## 2024-12-23 NOTE — TELEPHONE ENCOUNTER
Spoke with patient. She sates that she is currently in the hospital at [facility] with a broken bone.She sates that is after she was in seen at [facility] and had her follow up. She states that her daughter was that one checking on her referral for Dr. Behar.  Referral entered. Message routed to manage care to expedite referral

## 2024-12-24 ENCOUNTER — MED REC SCAN ONLY (OUTPATIENT)
Dept: INTERNAL MEDICINE CLINIC | Facility: CLINIC | Age: 69
End: 2024-12-24

## 2024-12-26 ENCOUNTER — APPOINTMENT (OUTPATIENT)
Dept: PHYSICAL THERAPY | Age: 69
End: 2024-12-26
Attending: FAMILY MEDICINE
Payer: MEDICARE

## 2024-12-26 ENCOUNTER — TELEPHONE (OUTPATIENT)
Dept: INTERNAL MEDICINE CLINIC | Facility: CLINIC | Age: 69
End: 2024-12-26

## 2024-12-26 NOTE — TELEPHONE ENCOUNTER
Gisela, from [facility], needs a verbal confirmation of home health orders.    Please call Gisela, from [facility], to confirm these orders:    [phone]    KG

## 2024-12-26 NOTE — TELEPHONE ENCOUNTER
Adilene, from [facility], called for verbal orders for Home Health services after a recent hospital stay.    Please call Adilene at [facility] to begin these home health services:    [phone]

## 2024-12-26 NOTE — TELEPHONE ENCOUNTER
Spoke with Maritza  Home health RN from [facility].  Verbal order given for patient to states her nurse and PT visit post laminectomy on 12/24/24. They will fax order to be signed.

## 2024-12-26 NOTE — TELEPHONE ENCOUNTER
Unable to reach patient for medication adherence consult. Reached patient's sister who requests a call back at another time.

## 2024-12-27 NOTE — TELEPHONE ENCOUNTER
Reached patient for medication adherence consult. Patient overdue for refill on metformin per insurance report.    Patient reports that she is taking the medication as prescribed. She reports tolerating the medication well. She denies the need for refills at this time.    Provided education on importance of adherence. Patient denies any questions or concerns with medication.

## 2024-12-31 ENCOUNTER — APPOINTMENT (OUTPATIENT)
Dept: PHYSICAL THERAPY | Age: 69
End: 2024-12-31
Attending: FAMILY MEDICINE
Payer: MEDICARE

## 2025-01-03 ENCOUNTER — APPOINTMENT (OUTPATIENT)
Dept: PHYSICAL THERAPY | Age: 70
End: 2025-01-03
Attending: FAMILY MEDICINE
Payer: MEDICARE

## 2025-01-06 ENCOUNTER — APPOINTMENT (OUTPATIENT)
Dept: PHYSICAL THERAPY | Age: 70
End: 2025-01-06
Attending: FAMILY MEDICINE
Payer: MEDICARE

## 2025-01-06 ENCOUNTER — TELEPHONE (OUTPATIENT)
Dept: INTERNAL MEDICINE CLINIC | Facility: CLINIC | Age: 70
End: 2025-01-06

## 2025-01-06 DIAGNOSIS — Z98.890 STATUS POST LUMBAR SPINE SURGERY FOR DECOMPRESSION OF SPINAL CORD: ICD-10-CM

## 2025-01-06 DIAGNOSIS — Z98.890 S/P LAMINECTOMY: Primary | ICD-10-CM

## 2025-01-06 DIAGNOSIS — M48.061 SPINAL STENOSIS OF LUMBAR REGION, UNSPECIFIED WHETHER NEUROGENIC CLAUDICATION PRESENT: ICD-10-CM

## 2025-01-06 NOTE — TELEPHONE ENCOUNTER
Call placed to [facility].  HH Rep / Erika states pt is post laminectomy w/ decompression of the intravertebral nerve root on 12/24 @ [facility] for lumbar stenosis.    Needs Home Health nursing -visits 1x/ wk x 7 weeks and physical therapy 1x/ wkly x 4 weeks. [employer] told this HHA that they will not approve until new referral is placed by PCP and prior referral to other HHA is cancelled.    Per Epic, no prior HHA referral has been generated thus no referral exists to cancel.  Routed message to [facility] dept to obtain this HHA auth for patient.  _________________  Per K Block/ [facility] care--  staff do not obtain HHA auths- agency does but they approved it in the system for tracking purposes only.  Call placed to Erika at [facility] to convey this message. Obtained fax # to send copy of the referral tracking   Referral # Creation Date Referral Status Status Update    [id number] 01/06/2025 Open          Referral request faxed this afternoon to [phone]

## 2025-01-06 NOTE — TELEPHONE ENCOUNTER
[facility] called and said that hh started on Dec. 26, [facility] said Dr. Westfall needs to send authorization to [facility] for [facility]. They cannot continue to see her until the current homehealth authorization is voided.    Erika  [phone]

## 2025-01-07 ENCOUNTER — OFFICE VISIT (OUTPATIENT)
Dept: INTERNAL MEDICINE CLINIC | Facility: CLINIC | Age: 70
End: 2025-01-07
Payer: MEDICARE

## 2025-01-07 VITALS
TEMPERATURE: 97 F | BODY MASS INDEX: 30.16 KG/M2 | OXYGEN SATURATION: 99 % | SYSTOLIC BLOOD PRESSURE: 130 MMHG | HEART RATE: 101 BPM | DIASTOLIC BLOOD PRESSURE: 68 MMHG | HEIGHT: 65 IN | WEIGHT: 181 LBS

## 2025-01-07 DIAGNOSIS — N18.31 TYPE 2 DIABETES MELLITUS WITH STAGE 3A CHRONIC KIDNEY DISEASE, WITHOUT LONG-TERM CURRENT USE OF INSULIN (HCC): ICD-10-CM

## 2025-01-07 DIAGNOSIS — I71.40 ABDOMINAL AORTIC ANEURYSM (AAA) WITHOUT RUPTURE, UNSPECIFIED PART (HCC): ICD-10-CM

## 2025-01-07 DIAGNOSIS — E78.5 HYPERLIPIDEMIA, UNSPECIFIED HYPERLIPIDEMIA TYPE: Primary | ICD-10-CM

## 2025-01-07 DIAGNOSIS — E11.22 TYPE 2 DIABETES MELLITUS WITH STAGE 3A CHRONIC KIDNEY DISEASE, WITHOUT LONG-TERM CURRENT USE OF INSULIN (HCC): ICD-10-CM

## 2025-01-07 DIAGNOSIS — M1A.9XX0 CHRONIC GOUT WITHOUT TOPHUS, UNSPECIFIED CAUSE, UNSPECIFIED SITE: ICD-10-CM

## 2025-01-07 DIAGNOSIS — J44.9 CHRONIC OBSTRUCTIVE PULMONARY DISEASE, UNSPECIFIED COPD TYPE (HCC): ICD-10-CM

## 2025-01-07 DIAGNOSIS — M48.062 SPINAL STENOSIS OF LUMBAR REGION WITH NEUROGENIC CLAUDICATION: ICD-10-CM

## 2025-01-07 DIAGNOSIS — Z12.31 SCREENING MAMMOGRAM FOR BREAST CANCER: ICD-10-CM

## 2025-01-07 DIAGNOSIS — I10 PRIMARY HYPERTENSION: ICD-10-CM

## 2025-01-07 DIAGNOSIS — E11.42 DIABETIC POLYNEUROPATHY ASSOCIATED WITH TYPE 2 DIABETES MELLITUS (HCC): ICD-10-CM

## 2025-01-07 PROBLEM — M25.552 PAIN OF LEFT HIP: Status: RESOLVED | Noted: 2024-11-20 | Resolved: 2025-01-07

## 2025-01-07 RX ORDER — AMLODIPINE BESYLATE 10 MG/1
10 TABLET ORAL DAILY
COMMUNITY

## 2025-01-07 RX ORDER — PRAVASTATIN SODIUM 10 MG
10 TABLET ORAL NIGHTLY
Qty: 90 TABLET | Refills: 3 | Status: SHIPPED | OUTPATIENT
Start: 2025-01-07

## 2025-01-07 RX ORDER — INSULIN DEGLUDEC 200 U/ML
10 INJECTION, SOLUTION SUBCUTANEOUS NIGHTLY
COMMUNITY

## 2025-01-07 NOTE — PROGRESS NOTES
FAMILY MEDICINE CLINIC NOTE    HPI  Mandi Moran is a 69 year old female presenting for       #Spinal stenosis  #L3 lumbar fracture  -CT lumbar spine  12/22/24 - IMPRESSION: A large disc extrusion is demonstrated at L2-L3. This results in moderate tosevere spinal canal stenosis and severe left subarticular zone stenosis. Thereis moderate left foraminal stenosis.Severe spinal canal stenosis at L4-L5. Marked degenerative changes are notedelsewhere in the lumbar spine, as detailed in the body of the report. Mildly displaced fractures involving the superior endplate and osteophytes ofthe L3 vertebral body, as detailed above.  -s/p Laminotomy with decompression of nerve roots, including partial facetectomy, foraminotomy, and excision of herniated intervertebral disc 12/2024  -neurosurgeon Dr Aneesh griffin - weaning down  -home physical therapy     #DM  -Hba1c: 6.2 12/4/24  -Microalbuminuria: 12/2024  -B12:   -Pneumonia vaccine: Defers for now  -HepB:  -Eye exam: Referred to ophthalmology   -Diabetic foot exam: 11/20/24 Bilateral barefoot skin diabetic exam completed. Visualized feet and the appearance is normal. Bilateral monofilament/sensation of both feet is decreased throughout the bilateral feet except in the left 3rd, 4th and 5th digits. Pulsation pedal pulse exam of both lower legs/feet is normal.  -Current medications:metformin 1000 mg twice daily, tresiba  -Blood sugars: - checking - in the 100s  -AM:      -Noon:   -PM:      -hypoglycemia:    #Abdominal aortic aneurysm   - seen on outside scan, norecords  -will check ultrasound     #Diabetic neuropathy  -gabapentin 800 mg in the morning 400 mg in the afternoon and 400 mg at night      #HLD  #Hypertriglyceridemia   -fenofibrate 145 mg daily  -atorvastatin - had achiness, not taking  -switched rosuvastatin - shakiness    #HTN  -amlodipine 10 mg nightly      #Gout   -allopurinol 200 mg in the morning, 100 mg at night  -uric acid 12/2024 5.3      #COPD  -advair 25--50 mcg 1 puff twice daily  -albuterol as needed  -still smoking    ----other    #Abdominal discomfort  -pantoprazole 40 mg  -has seen GI for EGD and colonoscopy  -pt reports unremarkable EGD  -pt reports polyps and hemorrhoids for colonoscopy  -no reports available   -need to acquire record results - refer to GI as needed    #Cigarette smoker  -1.5 ppd     #CKD  -last CMP 9/2023        ROS  GENERAL: No fever/chills, no recent weight loss  HEENT: No visual changes, no changes in hearing, no sore throats  NECK: No pain, no swelling  RESP: No cough, no SOB  CV: No chest pain, no palpitations  GI: No abd pain, no N/V/D  MSK: No edema  SKIN: No new rashes  NEURO: No numbness, no tingling, no headaches    HEALTH MAINTENANCE  Health Maintenance Topics with due status: Overdue       Topic Date Due    Diabetes Care Dilated Eye Exam Never done    Colorectal Cancer Screening Never done    Mammogram Never done    Pneumococcal Vaccine: 65+ Years Never done    Lung Cancer Screening Never done    DEXA Scan Never done    Zoster Vaccines 10/27/2022    COVID-19 Vaccine 09/01/2024    Influenza Vaccine Never done    Annual Well Visit Never done    Annual Depression Screening Never done    Fall Risk Screening (Annual) 01/01/2025    Diabetes Care: Foot Exam (Annual) 01/01/2025    Tobacco Cessation Counseling Never done    Diabetes Care: Microalb/Creat Ratio (Annual) 01/01/2025    HTN: BP Follow-Up 01/05/2025       ALLERGIES  Allergies[1]    MEDICATIONS  Current Outpatient Medications   Medication Sig Dispense Refill    amLODIPine 10 MG Oral Tab Take 1 tablet (10 mg total) by mouth daily.      insulin degludec 200 unit/mL Subcutaneous Solution Pen-injector Inject 10 Units into the skin nightly.      pravastatin 10 MG Oral Tab Take 1 tablet (10 mg total) by mouth nightly. 90 tablet 3    cyclobenzaprine 10 MG Oral Tab Take 1 tablet (10 mg total) by mouth 2 (two) times daily as needed for Muscle spasms. 30 tablet 0     HYDROcodone-acetaminophen (NORCO) 5-325 MG Oral Tab Take 1 tablet by mouth every 8 (eight) hours as needed for Pain. 20 tablet 0    BD PEN NEEDLE MINI U/F 31G X 5 MM Does not apply Misc 1 kit 2 (two) times daily.      Lancets (ONETOUCH DELICA PLUS [id number]) Does not apply Misc Apply 1 Application topically 2 (two) times daily.      lidocaine 5 % External Patch Place 1 patch onto the skin daily. 10 patch 0    Naloxone HCl 4 MG/0.1ML Nasal Liquid 4 mg by Intranasal route as needed (May repeat as needed in other nostril if symptoms persist). If patient remains unresponsive, repeat dose in other nostril 2-5 minutes after first dose. 1 kit 0    pantoprazole 40 MG Oral Tab EC TAKE 1 TABLET BY MOUTH EVERY MORNING BEFORE BREAKFAST FOR 90 DAYS      metFORMIN 500 MG Oral Tab 1 tablet (500 mg total) 2 (two) times daily with meals.      allopurinol 100 MG Oral Tab TAKE 2 TABLETS BY MOUTH EVERY MORNING AND 1 TABLET EVERY EVENING      fenofibrate 145 MG Oral Tab Take 1 tablet (145 mg total) by mouth daily.      Cholecalciferol (VITAMIN D3) 250 MCG (84055 UT) Oral Cap 250 mcg.      Ascorbic Acid (VITAMIN C) 500 MG Oral Cap 500 mg.      B Complex Vitamins (PA B-COMPLEX WITH B-12 OR) PO, Daily, 0 Refill(s)      albuterol 108 (90 Base) MCG/ACT Inhalation Aero Soln every 28 days. FOR 21 DAYS IN, THEN REMOVE FOR 7 DAYS      acetaminophen (TYLENOL EXTRA STRENGTH) 500 MG Oral Tab 2 tablets (1,000 mg total).      APPLE CIDER VINEGAR OR Take 450 mg by mouth.      fluticasone-salmeterol (ADVAIR DISKUS) 250-50 MCG/ACT Inhalation Aerosol Powder, Breath Activated Inhale 1 puff into the lungs 2 (two) times daily. 60 each 3    gabapentin 400 MG Oral Cap Gabapentin 800 mg in the morning, 400 mg in the afternoon, 400 mg at night 360 capsule 3       ACTIVE PROBLEMS  Patient Active Problem List   Diagnosis    Abdominal discomfort    Type 2 diabetes mellitus with stage 3a chronic kidney disease, without long-term current use of insulin (HCC)     Hyperlipidemia    Primary hypertension    Stage 3a chronic kidney disease (HCC)    Chronic obstructive pulmonary disease (HCC)    Chronic gout without tophus    Diabetic polyneuropathy associated with type 2 diabetes mellitus (HCC)    Cigarette smoker    Health maintenance examination    Spinal stenosis of lumbar region with neurogenic claudication    Abdominal aortic aneurysm (AAA) without rupture (HCC)       PAST MEDICAL HISTORY  Past Medical History:    Arthritis    COPD (chronic obstructive pulmonary disease) (HCC)    Diabetes (HCC)    Essential hypertension    Gout    Hyperlipidemia       PAST SOCIAL HISTORY  Social History     Socioeconomic History    Marital status:      Spouse name: Not on file    Number of children: Not on file    Years of education: Not on file    Highest education level: Not on file   Occupational History    Not on file   Tobacco Use    Smoking status: Every Day     Current packs/day: 1.50     Average packs/day: 1 pack/day for 59.0 years (61.5 ttl pk-yrs)     Types: Cigarettes     Start date: 1966    Smokeless tobacco: Current   Vaping Use    Vaping status: Some Days    Substances: Nicotine   Substance and Sexual Activity    Alcohol use: Yes     Comment: A couple times a year    Drug use: Never    Sexual activity: Not Currently     Partners: Male   Other Topics Concern    Not on file   Social History Narrative    Relationships: . Brother - Aneesh Bullard    Children:     Pets:     School:     Work:     Origin:     Interests:     Spiritual:          Social Drivers of Health     Financial Resource Strain: Low Risk  (12/3/2024)    Financial Resource Strain     Difficulty of Paying Living Expenses: Not hard at all     Med Affordability: Not on file   Food Insecurity: Not at Risk (8/2/2024)    Received from [employer]    Food Insecurity     Food: 1   Transportation Needs: No Transportation Needs (12/3/2024)    Transportation Needs     Lack of Transportation: No     Car Seat: Not on  file   Physical Activity: Not on File (10/6/2022)    Received from [employer]    Physical Activity     Physical Activity: 0   Stress: Not on File (10/6/2022)    Received from [employer]    Stress     Stress: 0   Social Connections: Not on File (2024)    Received from [employer]    Social Connections     Connectedness: 0   Housing Stability: Not at Risk (2024)    Received from [employer]    Housing Stability     Housin       PAST SURGICAL HISTORY  Past Surgical History:   Procedure Laterality Date          Cholecystectomy      Laminectomy  2024    Laminotomy with decompression of nerve roots, including partial facetectomy, foraminotomy, and excision of herniated intervertebral disc       PAST FAMILY HISTORY  Family History   Problem Relation Age of Onset    Skin cancer Mother     Depression Mother     Diabetes Mother     Cancer Father     Suicide History Father     Skin cancer Sister     Depression Brother     Cataracts Brother     No Known Problems Maternal Grandmother     Kidney Disease Maternal Grandfather     No Known Problems Paternal Grandmother     No Known Problems Paternal Grandfather     Breast Cancer Neg     Colon Cancer Neg          PHYSICAL EXAM  Vitals:    25 1500 25 1525   BP: 146/84 130/68   Pulse: 101    Temp: 97.4 °F (36.3 °C)    SpO2: 99%    Weight: 181 lb (82.1 kg)    Height: 5' 5\" (1.651 m)       Body mass index is 30.12 kg/m².    GENERAL: NAD  NECK: Supple, non-tender  RESP: Non-labored respirations, CTAB, no wheezing, no rales, no rhonchi  CV: RRR, no murmurs  MSK: No edema  SKIN: Warm and dry, no rashes. Surgical incision seen on the left lumbar spine appears to be clean, dry without surrounding erythema or warmth   NEURO: Answering questions appropriately    LABS  Lab Results   Component Value Date    WBC 9.5 2024    HGB 13.8 2024    HCT 40.8 2024     2024    NEPERCENT 66 2024    LYPERCENT 24.2 2024    MOPERCENT 6.3 2024     EOPERCENT 3.2 12/03/2024    BAPERCENT 0.3 12/03/2024    NE 6,270 12/03/2024    LYMABS 2,299 12/03/2024    MOABSO 599 12/03/2024    EOABSO 304 12/03/2024    BAABSO 29 12/03/2024       Lab Results   Component Value Date     12/03/2024    K 4.7 12/03/2024     12/03/2024    CO2 26 12/03/2024    BUN 44 (H) 12/03/2024    CREATSERUM 1.66 (H) 12/03/2024    BUNCREA 27 (H) 12/03/2024    GLU 97 12/03/2024    CA 10.4 12/03/2024    ALT 18 12/03/2024    AST 22 12/03/2024    ALKPHO 60 12/03/2024    BILT 0.3 12/03/2024    TP 7.7 12/03/2024    ALB 4.7 12/03/2024    GLOBULIN 3.0 12/03/2024         Lab Results   Component Value Date    CHOLEST 214 (H) 12/03/2024    TRIG 452 (H) 12/03/2024    HDL 38 (L) 12/03/2024    LDL  12/03/2024      Comment:         LDL cholesterol not calculated. Triglyceride levels  greater than 400 mg/dL invalidate calculated LDL results.     Reference range: <100     Desirable range <100 mg/dL for primary prevention;    <70 mg/dL for patients with CHD or diabetic patients   with > or = 2 CHD risk factors.     LDL-C is now calculated using the Ganesh-Larry   calculation, which is a validated novel method providing   better accuracy than the Friedewald equation in the   estimation of LDL-C.   Ganesh NGUYEN et al. ISAAC. 2013;310(19): 1143-2747   (http://education.[employer].com/faq/ZBQ076)      TCHDLRATIO 5.6 (H) 12/03/2024    NONHDLC 176 (H) 12/03/2024        DIAGNOSTICS      ASSESSMENT/PLAN  Problem List Items Addressed This Visit          HCC Problems    Abdominal aortic aneurysm (AAA) without rupture (HCC)     Patient with an abdominal aortic aneurysm seen on outside scan  I have no outside results available to review, however that it is noted on her outside hospital report.  Will check abdominal aortic aneurysm ultrasound screen         Relevant Medications    amLODIPine 10 MG Oral Tab    pravastatin 10 MG Oral Tab    Other Relevant Orders    US ABDOMINAL AORTIC ANEURYSM SCREENING (TMJ=08783)     Chronic obstructive pulmonary disease (HCC)     Patient with COPD.  Continue Advair 250-50 mcg 1 puff twice daily.  Use albuterol as needed.  Defers on Prevnar 20 vaccine.         Diabetic polyneuropathy associated with type 2 diabetes mellitus ([facility])     Patient with diabetic neuropathy  Currently using gabapentin 100 mg in the morning, 40 mg the afternoon and 4 mg at night.  Needs to maintain diabetic control         Type 2 diabetes mellitus with stage 3a chronic kidney disease, without long-term current use of insulin ([facility])     Patient with diabetes with complications of chronic kidney disease, diabetic neuropathy.  Goal A1c less than 7.  Diabetes is controlled at this time.   Currently on metformin 1000 mg twice daily and degludec 10 units nightly.  Monitor blood sugars at home.  Referral to ophthalmology provided.  Plan for prevnar 20 vaccine in future when feeling better  Follow up in 3 months         Relevant Medications    insulin degludec 200 unit/mL Subcutaneous Solution Pen-injector    pravastatin 10 MG Oral Tab    Other Relevant Orders    OPHTHALMOLOGY - EXTERNAL       Cardiac and Vasculature    Hyperlipidemia - Primary     Patient with hyperlipidemia with hypertriglyceridemia.  She notes that she did not tolerate atorvastatin and rosuvastatin in the past due to myalgias  Switch to pravastatin 10 mg nightly   Can continue fenofibrate 145 mg daily         Relevant Medications    insulin degludec 200 unit/mL Subcutaneous Solution Pen-injector    pravastatin 10 MG Oral Tab    Primary hypertension     Patient with hypertension.  Blood pressures are controlled in the office.  Continue amlodipine 10 mg nightly         Relevant Medications    amLODIPine 10 MG Oral Tab       Neuro    Spinal stenosis of lumbar region with neurogenic claudication     Patient with spinal stenosis and L3 lumbar fracture.  She is status post Laminotomy with decompression of nerve roots, including partial facetectomy, foraminotomy,  and excision of herniated intervertebral disc.  She is feeling much better at this time.  She has home physical therapy  Advised slowly weaning down on Norco.  Continue to follow with neurosurgery.  Will plan for DEXA scan order at future appointment.             Musculoskeletal and Injuries    Chronic gout without tophus     Patient with a history of chronic gout.  Currently on allopurinol 200 mg in the morning and 100 mg at night          Other Visit Diagnoses       Screening mammogram for breast cancer        Relevant Orders    [facility] 2D+3D SCREENING BILAT (CPT=77067/28692)            Return in about 3 months (around 2025) for medicare visit.    No topic due editable text     Johnny Westfall MD  Family Medicine           Pre-chartin minutes  Reviewing/obtaining: 15 minutes  Medical Exam:1 minutes  Counseling/education: 5 minutes  Notes: 5 minutes  Referring/communicatin minutes  Care coordination: 0 minutes    My total time spent caring for the patient on the day of the encounter: 28 minutes         [1]   Allergies  Allergen Reactions    Atorvastatin OTHER (SEE COMMENTS)     Muscle aches

## 2025-01-07 NOTE — ASSESSMENT & PLAN NOTE
Patient with hypertension.  Blood pressures are controlled in the office.  Continue amlodipine 10 mg nightly

## 2025-01-07 NOTE — ASSESSMENT & PLAN NOTE
Patient with a history of chronic gout.  Currently on allopurinol 200 mg in the morning and 100 mg at night

## 2025-01-07 NOTE — ASSESSMENT & PLAN NOTE
Patient with spinal stenosis and L3 lumbar fracture.  She is status post Laminotomy with decompression of nerve roots, including partial facetectomy, foraminotomy, and excision of herniated intervertebral disc.  She is feeling much better at this time.  She has home physical therapy  Advised slowly weaning down on Norco.  Continue to follow with neurosurgery.  Will plan for DEXA scan order at future appointment.

## 2025-01-07 NOTE — ASSESSMENT & PLAN NOTE
Patient with COPD.  Continue Advair 250-50 mcg 1 puff twice daily.  Use albuterol as needed.  Defers on Prevnar 20 vaccine.

## 2025-01-07 NOTE — PATIENT INSTRUCTIONS
PATIENT INSTRUCTIONS    Thank you for seeing me today, it was a pleasure taking care of you.  Please check out at the  and schedule a follow up appointment.  Return in about 3 months (around 4/7/2025) for medicare visit.  Please remember that the preferred luis m period for appointments is 5 minutes. This is to help maximize the amount of time that we can spend together at our visits.    Aortic aneurysm ultrasound  Mammogram  Please call [phone] to schedule your imaging appointment.   Continue home therapy  See neurosurgery  See ophthalmology  Provider Address Phone   Abilio Lynn MD [address] [facility] [address] [phone]        Dr. Khoi John

## 2025-01-07 NOTE — ASSESSMENT & PLAN NOTE
Patient with hyperlipidemia with hypertriglyceridemia.  She notes that she did not tolerate atorvastatin and rosuvastatin in the past due to myalgias  Switch to pravastatin 10 mg nightly   Can continue fenofibrate 145 mg daily

## 2025-01-07 NOTE — ASSESSMENT & PLAN NOTE
Called  Honey Rob back and had to leave a message to call me back. Patient with diabetes with complications of chronic kidney disease, diabetic neuropathy.  Goal A1c less than 7.  Diabetes is controlled at this time.   Currently on metformin 1000 mg twice daily and degludec 10 units nightly.  Monitor blood sugars at home.  Referral to ophthalmology provided.  Plan for prevnar 20 vaccine in future when feeling better  Follow up in 3 months

## 2025-01-07 NOTE — ASSESSMENT & PLAN NOTE
Patient with an abdominal aortic aneurysm seen on outside scan  I have no outside results available to review, however that it is noted on her outside hospital report.  Will check abdominal aortic aneurysm ultrasound screen

## 2025-01-08 ENCOUNTER — APPOINTMENT (OUTPATIENT)
Dept: PHYSICAL THERAPY | Age: 70
End: 2025-01-08
Attending: FAMILY MEDICINE
Payer: MEDICARE

## 2025-01-09 ENCOUNTER — MED REC SCAN ONLY (OUTPATIENT)
Dept: INTERNAL MEDICINE CLINIC | Facility: CLINIC | Age: 70
End: 2025-01-09

## 2025-01-15 ENCOUNTER — PATIENT OUTREACH (OUTPATIENT)
Dept: CASE MANAGEMENT | Age: 70
End: 2025-01-15

## 2025-01-15 NOTE — PROGRESS NOTES
NC spoke with pt briefly states she is feeling fine. She denies any symptoms at this time. She declined further assessment and/or any questions, outreaches. Pt will call NC if has any further questions or concerns. NC closing encounter.

## 2025-02-14 ENCOUNTER — HOSPITAL ENCOUNTER (OUTPATIENT)
Dept: MAMMOGRAPHY | Age: 70
Discharge: HOME | End: 2025-02-14
Attending: FAMILY MEDICINE
Payer: MEDICARE

## 2025-02-14 DIAGNOSIS — Z12.31 SCREENING MAMMOGRAM FOR BREAST CANCER: ICD-10-CM

## 2025-02-14 PROCEDURE — 77063 BREAST TOMOSYNTHESIS BI: CPT | Performed by: FAMILY MEDICINE

## 2025-02-14 PROCEDURE — 77067 SCR MAMMO BI INCL CAD: CPT | Performed by: FAMILY MEDICINE

## 2025-02-14 NOTE — ASSESSMENT & PLAN NOTE
Patient with chronic intermittent left hip pain.  Recently pain worsened 1 week ago.  She notes radicular symptoms down her left lower extremity  Referral to physical therapy provided   Statement Selected

## 2025-02-17 NOTE — TELEPHONE ENCOUNTER
Patient called for a refill, Metformin 1000 MG Oral Tab    [id number] - [place], [place] - [address] AT [facility], [phone], [phone]   [address] [place] [address]   Phone: [phone] Fax: [phone]   Hours: Open 24 hours

## 2025-02-18 DIAGNOSIS — E11.22 TYPE 2 DIABETES MELLITUS WITH STAGE 3A CHRONIC KIDNEY DISEASE, WITHOUT LONG-TERM CURRENT USE OF INSULIN (HCC): Primary | ICD-10-CM

## 2025-02-18 DIAGNOSIS — N18.31 TYPE 2 DIABETES MELLITUS WITH STAGE 3A CHRONIC KIDNEY DISEASE, WITHOUT LONG-TERM CURRENT USE OF INSULIN (HCC): Primary | ICD-10-CM

## 2025-02-26 ENCOUNTER — HOSPITAL ENCOUNTER (OUTPATIENT)
Dept: ULTRASOUND IMAGING | Age: 70
Discharge: HOME | End: 2025-02-26
Attending: FAMILY MEDICINE
Payer: MEDICARE

## 2025-02-26 ENCOUNTER — PATIENT MESSAGE (OUTPATIENT)
Dept: ADMINISTRATIVE | Age: 70
End: 2025-02-26

## 2025-02-26 DIAGNOSIS — I71.40 ABDOMINAL AORTIC ANEURYSM (AAA) WITHOUT RUPTURE, UNSPECIFIED PART: ICD-10-CM

## 2025-02-26 PROCEDURE — 76706 US ABDL AORTA SCREEN AAA: CPT | Performed by: FAMILY MEDICINE

## 2025-03-03 DIAGNOSIS — J44.9 CHRONIC OBSTRUCTIVE PULMONARY DISEASE, UNSPECIFIED COPD TYPE (HCC): ICD-10-CM

## 2025-03-03 RX ORDER — FLUTICASONE PROPIONATE AND SALMETEROL 250; 50 UG/1; UG/1
1 POWDER RESPIRATORY (INHALATION) 2 TIMES DAILY
Qty: 60 EACH | Refills: 3 | Status: SHIPPED | OUTPATIENT
Start: 2025-03-03

## 2025-03-12 ENCOUNTER — TELEPHONE (OUTPATIENT)
Dept: INTERNAL MEDICINE CLINIC | Facility: CLINIC | Age: 70
End: 2025-03-12

## 2025-03-20 NOTE — TELEPHONE ENCOUNTER
Patient called the office.  She said she reached out to [facility] to verify this referral has been authorized.  They state they don't have it.    She is asking if this referral has been authorized.

## 2025-04-16 DIAGNOSIS — E11.22 TYPE 2 DIABETES MELLITUS WITH STAGE 3A CHRONIC KIDNEY DISEASE, WITHOUT LONG-TERM CURRENT USE OF INSULIN (HCC): Primary | ICD-10-CM

## 2025-04-16 DIAGNOSIS — N18.31 TYPE 2 DIABETES MELLITUS WITH STAGE 3A CHRONIC KIDNEY DISEASE, WITHOUT LONG-TERM CURRENT USE OF INSULIN (HCC): Primary | ICD-10-CM

## 2025-04-16 RX ORDER — INSULIN DEGLUDEC 200 U/ML
10 INJECTION, SOLUTION SUBCUTANEOUS NIGHTLY
Qty: 12 ML | Refills: 3 | Status: SHIPPED | OUTPATIENT
Start: 2025-04-16

## 2025-04-16 NOTE — TELEPHONE ENCOUNTER
LOV:1/7/25  Last refill:reported       Next Appt: With Internal Medicine (Johnny Westfall MD)  04/30/2025 at 1:20 PM

## 2025-04-16 NOTE — TELEPHONE ENCOUNTER
Patient called for a refill, Tresiba Flex Touch Insulin.    [id number] - [place], [place] - [address] AT [facility], [phone], [phone]   [address] [place] [address]   Phone: [phone] Fax: [phone]   Hours: Open 24 hours

## 2025-04-24 ENCOUNTER — OFFICE VISIT (OUTPATIENT)
Facility: CLINIC | Age: 70
End: 2025-04-24

## 2025-04-24 VITALS
SYSTOLIC BLOOD PRESSURE: 134 MMHG | BODY MASS INDEX: 30.32 KG/M2 | HEIGHT: 65 IN | DIASTOLIC BLOOD PRESSURE: 68 MMHG | WEIGHT: 182 LBS

## 2025-04-24 DIAGNOSIS — I65.29 CAROTID ATHEROSCLEROSIS, UNSPECIFIED LATERALITY: ICD-10-CM

## 2025-04-24 DIAGNOSIS — I71.43 INFRARENAL ABDOMINAL AORTIC ANEURYSM (AAA) WITHOUT RUPTURE: Primary | ICD-10-CM

## 2025-04-24 PROCEDURE — 99204 OFFICE O/P NEW MOD 45 MIN: CPT | Performed by: SURGERY

## 2025-04-30 ENCOUNTER — OFFICE VISIT (OUTPATIENT)
Dept: INTERNAL MEDICINE CLINIC | Facility: CLINIC | Age: 70
End: 2025-04-30
Payer: MEDICARE

## 2025-04-30 VITALS
DIASTOLIC BLOOD PRESSURE: 86 MMHG | TEMPERATURE: 97 F | HEART RATE: 111 BPM | OXYGEN SATURATION: 96 % | BODY MASS INDEX: 30.16 KG/M2 | SYSTOLIC BLOOD PRESSURE: 134 MMHG | WEIGHT: 181 LBS | HEIGHT: 65 IN

## 2025-04-30 DIAGNOSIS — Z00.00 ENCOUNTER FOR ANNUAL HEALTH EXAMINATION: ICD-10-CM

## 2025-04-30 DIAGNOSIS — F17.210 CIGARETTE SMOKER: ICD-10-CM

## 2025-04-30 DIAGNOSIS — N18.31 TYPE 2 DIABETES MELLITUS WITH STAGE 3A CHRONIC KIDNEY DISEASE, WITHOUT LONG-TERM CURRENT USE OF INSULIN (HCC): ICD-10-CM

## 2025-04-30 DIAGNOSIS — Z78.9 STATIN INTOLERANCE: ICD-10-CM

## 2025-04-30 DIAGNOSIS — E78.5 HYPERLIPIDEMIA, UNSPECIFIED HYPERLIPIDEMIA TYPE: ICD-10-CM

## 2025-04-30 DIAGNOSIS — Z78.0 ASYMPTOMATIC MENOPAUSAL STATE: ICD-10-CM

## 2025-04-30 DIAGNOSIS — H91.93 BILATERAL HEARING LOSS, UNSPECIFIED HEARING LOSS TYPE: ICD-10-CM

## 2025-04-30 DIAGNOSIS — J44.9 CHRONIC OBSTRUCTIVE PULMONARY DISEASE, UNSPECIFIED COPD TYPE (HCC): ICD-10-CM

## 2025-04-30 DIAGNOSIS — I71.40 ABDOMINAL AORTIC ANEURYSM (AAA) WITHOUT RUPTURE, UNSPECIFIED PART: ICD-10-CM

## 2025-04-30 DIAGNOSIS — I10 PRIMARY HYPERTENSION: ICD-10-CM

## 2025-04-30 DIAGNOSIS — R10.9 ABDOMINAL DISCOMFORT: ICD-10-CM

## 2025-04-30 DIAGNOSIS — E11.22 TYPE 2 DIABETES MELLITUS WITH STAGE 3A CHRONIC KIDNEY DISEASE, WITHOUT LONG-TERM CURRENT USE OF INSULIN (HCC): ICD-10-CM

## 2025-04-30 DIAGNOSIS — M48.062 SPINAL STENOSIS OF LUMBAR REGION WITH NEUROGENIC CLAUDICATION: ICD-10-CM

## 2025-04-30 DIAGNOSIS — N18.31 STAGE 3A CHRONIC KIDNEY DISEASE (HCC): ICD-10-CM

## 2025-04-30 DIAGNOSIS — M1A.9XX0 CHRONIC GOUT WITHOUT TOPHUS, UNSPECIFIED CAUSE, UNSPECIFIED SITE: ICD-10-CM

## 2025-04-30 DIAGNOSIS — E11.42 DIABETIC POLYNEUROPATHY ASSOCIATED WITH TYPE 2 DIABETES MELLITUS (HCC): ICD-10-CM

## 2025-04-30 DIAGNOSIS — Z00.00 HEALTH MAINTENANCE EXAMINATION: Primary | ICD-10-CM

## 2025-04-30 LAB — HEMOGLOBIN A1C: 6.1 % (ref 4.3–5.6)

## 2025-04-30 RX ORDER — INSULIN GLARGINE 100 [IU]/ML
10 INJECTION, SOLUTION SUBCUTANEOUS NIGHTLY
Qty: 15 ML | Refills: 3 | Status: SHIPPED | OUTPATIENT
Start: 2025-04-30

## 2025-04-30 RX ORDER — FLUTICASONE PROPIONATE AND SALMETEROL 250; 50 UG/1; UG/1
1 POWDER RESPIRATORY (INHALATION) 2 TIMES DAILY
Qty: 60 EACH | Refills: 3 | Status: SHIPPED | OUTPATIENT
Start: 2025-04-30

## 2025-04-30 RX ORDER — FENOFIBRATE 145 MG/1
145 TABLET, FILM COATED ORAL DAILY
Qty: 90 TABLET | Refills: 1 | Status: SHIPPED | OUTPATIENT
Start: 2025-04-30

## 2025-04-30 RX ORDER — INSULIN DEGLUDEC 200 U/ML
10 INJECTION, SOLUTION SUBCUTANEOUS NIGHTLY
Qty: 12 ML | Refills: 3 | Status: CANCELLED | OUTPATIENT
Start: 2025-04-30

## 2025-04-30 RX ORDER — GABAPENTIN 400 MG/1
CAPSULE ORAL
Qty: 360 CAPSULE | Refills: 3 | Status: SHIPPED | OUTPATIENT
Start: 2025-04-30

## 2025-04-30 RX ORDER — ASPIRIN 81 MG/1
81 TABLET ORAL DAILY
Qty: 90 TABLET | Refills: 0 | Status: SHIPPED | OUTPATIENT
Start: 2025-04-30

## 2025-04-30 RX ORDER — ALLOPURINOL 100 MG/1
TABLET ORAL
Qty: 270 TABLET | Refills: 3 | Status: SHIPPED | OUTPATIENT
Start: 2025-04-30

## 2025-04-30 NOTE — ASSESSMENT & PLAN NOTE
Patient with diabetes with complications of chronic kidney disease, diabetic neuropathy.  Goal A1c less than 7.  Diabetes is controlled at this time.   Currently on metformin 1000 mg twice daily and degludec 10 units nightly.  Difficulty with insurance coverage of degludec.  Can try lantus 10 units nightly instead.  Notify me if there is a better covered insulin.   Monitor blood sugars at home.  Prevnar 20  Follow up in 6 months

## 2025-04-30 NOTE — PROGRESS NOTES
Samer F. Najjar, MD  Vascular Surgery  [facility]      VASCULAR SURGERY   CLINIC CONSULT NOTE        Name: Mandi Moran   :   1955  [id number]     REFERRING PHYSICIAN:  Johnny Westfall  PRIMARY CARE PHYSICIAN:  Johnny Westfall MD    HISTORY OF PRESENT ILLNESS:   Patient is a 70 year old female who has been referred regarding management of  her a large abdominal aortic aneurysm that was seen on outside obtained for her back pain.  This was followed by an ultrasound here that revealed that the aneurysm has reached a size of 5.1 cm.  The patient is a smoker, 1 and half packs a day, and has been doing so for the last 16 years.  She is diabetic.  She is on a statin but not on aspirin        IMAGING:            PAST MEDICAL HISTORY:    Past Medical History[1]    PAST SURGICAL HISTORY:   Past Surgical History[2]     MEDICATIONS:   Medications - Current[3]    ALLERGIES:    She is allergic to atorvastatin and nickel.    SOCIAL HISTORY:    Patient  reports that she has been smoking cigarettes. She started smoking about 59 years ago. She has a 62 pack-year smoking history. She uses smokeless tobacco. She reports current alcohol use. She reports that she does not use drugs.    FAMILY HISTORY:    Patient's family history includes Cancer in her father; Cataracts in her brother; Depression in her brother and mother; Diabetes in her mother; Kidney Disease in her maternal grandfather; No Known Problems in her maternal grandmother, paternal grandfather, and paternal grandmother; Skin cancer in her mother and sister; Suicide History in her father.    ROS:     A 12 point review of systems with pertinent positives and negatives listed in the HPI.    EXAM:    /68 (BP Location: Left arm, Patient Position: Sitting)   Ht 5' 5\" (1.651 m)   Wt 182 lb (82.6 kg)   BMI 30.29 kg/m²   GENERAL: alert and orientated X 3, well developed, well nourished, in no apparent distress  PSYCH: normal mood and  affect  HEENT: ears and throat are clear  NECK: supple, no lymphadenopathy, thyroid wnl  CAROTID: Bilateral bruits  RESPIRATORY: no rales, rhonchi, or wheezes B  CARDIO: RRR without murmur, no murmur, no gallop   ABDOMEN: soft, non-tender with no palpable aneurysm or masses  BACK: normal, no tenderness  SKIN: no rashes, warm and dry  EXTREMITIES: no tenderness  NEURO: no sensory or motor deficits  VASCULAR:      Femoral Popliteal DP PT Peroneal   Right 1+       non-palpable non-palpable    Left 1+       palpable, weak non-palpable          IMAGING:       ASSESSMENT  Diagnoses and all orders for this visit:    Infrarenal abdominal aortic aneurysm (AAA) without rupture  -     CTA ABDOMEN/PELVIS LOWER EXT BILAT W RUNOFF (CPT=75635); Future    Carotid atherosclerosis, unspecified laterality  -     CTA ABDOMEN/PELVIS LOWER EXT BILAT W RUNOFF (CPT=75635); Future  -     US CAROTID DUPLEX (CPT=93880); Future         I explained to the patient that I would recommend a CT angiogram of the abdomen and pelvis given that her aneurysm has crossed the size of 5 cm.  If the size is confirmed on the CT scan then I would recommend consideration for repair.  The patient is a smoker and I recommended that she consider smoking cessation.  We discussed the importance of controlling her blood pressure to decrease the chance of rupture and other cardiovascular risk events.  I have recommended that she start on a baby aspirin therapy in addition to her statin therapy.  The patient also has bilateral carotid bruits and I have recommended updating the carotid duplex to rule out carotid artery disease.  We have discussed the symptoms of aneurysm rupture with the importance of proceeding to the emergency room in that event.        PLAN:  CT angiogram of the abdomen pelvis.  Carotid duplex    The patient indicated an understanding of these issues and agreed to the plan and all questions were answered during the clinic visit.      Thank you for  allowing me to participate in your patient's care.   Please do not hesitate to contact me with any questions.    Sincerely,  Samer F. Najjar MD    Please note: Dragon speech recognition software was used to prepare this note. If a word or phrase is confusing, it is likely do to a failure of recognition.   Please contact me with any questions or clarifications.       [1]   Past Medical History:   Arthritis    COPD (chronic obstructive pulmonary disease) (HCC)    Diabetes (HCC)    Essential hypertension    Gout    Hyperlipidemia   [2]   Past Surgical History:  Procedure Laterality Date          Cholecystectomy      Laminectomy  2024    Laminotomy with decompression of nerve roots, including partial facetectomy, foraminotomy, and excision of herniated intervertebral disc   [3]   Current Outpatient Medications:     insulin degludec 200 unit/mL Subcutaneous Solution Pen-injector, Inject 10 Units into the skin nightly., Disp: 12 mL, Rfl: 3    FLUTICASONE-SALMETEROL 250-50 MCG/ACT Inhalation Aerosol Powder, Breath Activated, INHALE 1 PUFF INTO THE LUNGS TWICE A DAY, Disp: 60 each, Rfl: 3    metFORMIN HCl 1000 MG Oral Tab, Take 1 tablet (1,000 mg total) by mouth 2 (two) times daily with meals., Disp: 180 tablet, Rfl: 1    amLODIPine 10 MG Oral Tab, Take 1 tablet (10 mg total) by mouth daily., Disp: , Rfl:     pravastatin 10 MG Oral Tab, Take 1 tablet (10 mg total) by mouth nightly., Disp: 90 tablet, Rfl: 3    cyclobenzaprine 10 MG Oral Tab, Take 1 tablet (10 mg total) by mouth 2 (two) times daily as needed for Muscle spasms., Disp: 30 tablet, Rfl: 0    HYDROcodone-acetaminophen (NORCO) 5-325 MG Oral Tab, Take 1 tablet by mouth every 8 (eight) hours as needed for Pain., Disp: 20 tablet, Rfl: 0    BD PEN NEEDLE MINI U/F 31G X 5 MM Does not apply Misc, 1 kit 2 (two) times daily., Disp: , Rfl:     Lancets (ONETOUCH DELICA PLUS [id number]) Does not apply Misc, Apply 1 Application topically 2 (two) times daily., Disp:  , Rfl:     lidocaine 5 % External Patch, Place 1 patch onto the skin daily., Disp: 10 patch, Rfl: 0    Naloxone HCl 4 MG/0.1ML Nasal Liquid, 4 mg by Intranasal route as needed (May repeat as needed in other nostril if symptoms persist). If patient remains unresponsive, repeat dose in other nostril 2-5 minutes after first dose., Disp: 1 kit, Rfl: 0    pantoprazole 40 MG Oral Tab EC, TAKE 1 TABLET BY MOUTH EVERY MORNING BEFORE BREAKFAST FOR 90 DAYS, Disp: , Rfl:     allopurinol 100 MG Oral Tab, TAKE 2 TABLETS BY MOUTH EVERY MORNING AND 1 TABLET EVERY EVENING, Disp: , Rfl:     fenofibrate 145 MG Oral Tab, Take 1 tablet (145 mg total) by mouth daily., Disp: , Rfl:     Cholecalciferol (VITAMIN D3) 250 MCG (39831 UT) Oral Cap, 250 mcg., Disp: , Rfl:     Ascorbic Acid (VITAMIN C) 500 MG Oral Cap, 500 mg., Disp: , Rfl:     B Complex Vitamins (PA B-COMPLEX WITH B-12 OR), PO, Daily, 0 Refill(s), Disp: , Rfl:     albuterol 108 (90 Base) MCG/ACT Inhalation Aero Soln, every 28 days. FOR 21 DAYS IN, THEN REMOVE FOR 7 DAYS, Disp: , Rfl:     acetaminophen (TYLENOL EXTRA STRENGTH) 500 MG Oral Tab, 2 tablets (1,000 mg total)., Disp: , Rfl:     APPLE CIDER VINEGAR OR, Take 450 mg by mouth., Disp: , Rfl:     gabapentin 400 MG Oral Cap, Gabapentin 800 mg in the morning, 400 mg in the afternoon, 400 mg at night, Disp: 360 capsule, Rfl: 3

## 2025-04-30 NOTE — ASSESSMENT & PLAN NOTE
Exercise and diet advised.  Shingles vaccine advised  Prevnar 20 today  COVID vaccine advised.  Advanced directive information provided.  Colonoscopy - need name or report  CT lung screen ordered.  DEXA scan ordered.

## 2025-04-30 NOTE — ASSESSMENT & PLAN NOTE
Patient with spinal stenosis and L3 lumbar fracture.  She is status post Laminotomy with decompression of nerve roots, including partial facetectomy, foraminotomy, and excision of herniated intervertebral disc.  She is feeling much better at this time.  DEXA ordered.

## 2025-04-30 NOTE — ASSESSMENT & PLAN NOTE
Patient with chronic kidney disease.  Strongly advised avoiding nephrotoxic medications including oral NSAIDs.  Tylenol as needed for pain.

## 2025-04-30 NOTE — PROGRESS NOTES
FAMILY MEDICINE CLINIC NOTE - MEDICARE    HPI  Mandi Moran is a 70 year old female presenting for a MA AHA (Medicare Advantage Annual Health Assessment) and IPPE (Initial Preventative Physical Exam) (Welcome to Medicare- < 12 months on Medicare).    #Health Maintenance  -Diet: Tries to eat healthy. Cheats on holidays. Daughter cooks - a lot of carbs. Eats out twice a week.   -Exercise: Stretches  -Lung cancer screen: Indicated  -Colon cancer screen: - reports completed in 2024. Need to acquire records.   -Statin:  - last lipid panel 12/2024  -ASA: Indicated  -Breast cancer screen: - mammogram 2/2025   -Breast cancer medication to reduce risk: Declines   -Periods: Menopause  -Cervical cancer screen: Not indicated  -DEXA: Indicated  -BRCA: Not indicated  -Intimate partner violence: Denies abuse  -HIV screen: Not indicated  -Hep C screen: - 12/2024 negative  -Gonorrhea/chlamydia:  Not Indicated  -Syphillis: Not indicated  -TB: Not indicated  -Tobacco/alcohol: Per below      #Immunizations  -Tdap:  Medicare  -Flu shot: - not season  -PCV13: Not indicated   -PCV20: Indicated - declines  -PPSV23: Not indicated   -RZV (preferred) or VZL:  9/2022    -RSV: Indicated   -COVID: Indicated         #Spinal stenosis  #L3 lumbar fracture  -CT lumbar spine  12/22/24 - IMPRESSION: A large disc extrusion is demonstrated at L2-L3. This results in moderate tosevere spinal canal stenosis and severe left subarticular zone stenosis. Thereis moderate left foraminal stenosis.Severe spinal canal stenosis at L4-L5. Marked degenerative changes are notedelsewhere in the lumbar spine, as detailed in the body of the report. Mildly displaced fractures involving the superior endplate and osteophytes ofthe L3 vertebral body, as detailed above.  -s/p Laminotomy with decompression of nerve roots, including partial facetectomy, foraminotomy, and excision of herniated intervertebral disc 12/2024  -neurosurgeon Dr Aneesh Taveras -no longer  seeing  -tylenol for pain  -completed home PT  -pain is improved   -walking with walker or cane    #DM  -Hba1c: 6.2 12/4/24, 6.1 4/30/25  -Microalbuminuria: 12/2024, repeat urine microalbumin today  -B12: 12/2024  -Pneumonia vaccine: Prevnar 20 today  -HepB:  -Eye exam: Referred to ophthalmology   -Diabetic foot exam: 4/30/25 Bilateral barefoot skin diabetic exam completed. Visualized feet and the appearance is normal. Bilateral monofilament/sensation of both feet is decreased throughout the bilateral feet except in the left 4th and 5th digits and right 1first, 3rd and 5th digits. Pulsation pedal pulse exam of both lower legs/feet is normal.  -Current medications:metformin 1000 mg twice daily, tresiba 10 U nightly  -Blood sugars:     #Abdominal aortic aneurysm   -vascular Dr Najjar -consideration for repair.   -aspirin 81 mg daily - advised to start  -smoking cessation      #Diabetic neuropathy  -gabapentin 800 mg in the morning 400 mg in the afternoon and 800 mg at night      #HLD  #Hypertriglyceridemia   -fenofibrate 145 mg daily - not taking, advised to restart  -pravastatin 10 mg nightly - aching, discontinued  -prior meds: atorvastatin with achiness, rosuvastatin with shakiness  -will refer to cardiology     #HTN  -amlodipine 10 mg nightly      #Gout   -allopurinol 200 mg in the morning, 100 mg at night  -uric acid 12/2024 5.3     #COPD  -advair 25--50 mcg 1 puff twice daily - out of medication, needs to restart  -albuterol as needed  -still smoking        #Abdominal discomfort  -pantoprazole 40 mg  -has seen GI for EGD and colonoscopy  -pt reports unremarkable EGD  -pt reports polyps and hemorrhoids for colonoscopy  -no reports available   -need to acquire record results - refer to GI as needed    #Cigarette smoker  -1.5 ppd     #CKD  -avoid nephrotoxic medications        #Additional screenings  History/Other:   Fall Risk Assessment:   She has been screened for Falls and is High Risk. Fall Prevention  information provided to patient in After Visit Summary.    Do you feel unsteady when standing or walking?: Yes  Do you worry about falling?: Yes  Have you fallen in the past year?: No     Cognitive Assessment:   She had a completely normal cognitive assessment - see flowsheet entries       Functional Ability/Status:   Mandi Moran has some abnormal functions as listed below:  She has difficulties Shopping for Groceries based on screening of functional status. She has Hearing problems based on screening of functional status.She has Walking problems based on screening of functional status. She has problems with Daily Activities based on screening of functional status. She has problems with Memory based on screening of functional status.       Depression Screening (PHQ-2/PHQ-9): PHQ-2 SCORE: 1  , done 4/30/2025   Little interest or pleasure in doing things: 1         Advanced Directives:   She does NOT have a Living Will. [Do you have a living will?: No]  She does NOT have a Power of  for Health Care. [Do you have a healthcare power of ?: No]  Discussed Advance Care Planning with patient (and family/surrogate if present). Standard forms made available to patient in After Visit Summary.    #Patient Care Team  Patient Care Team:  Johnny Westfall MD as PCP - General (Family Medicine)  Aneesh Taveras MD (NEUROSURGERY)      ROS  GENERAL: No fever/chills, no recent weight loss   HEENT: No visual changes, no changes in hearing, no sore throats  NECK: No pain, no swelling  RESP: No cough, no SOB  CV: No chest pain, no palpitations  GI: No abd pain, no N/V/D  MSK: No edema, no pain  SKIN: No new rashes  NEURO: No numbness, no tingling, no HA    HEALTH MAINTENANCE CHECKLIST  Health Maintenance Topics with due status: Overdue       Topic Date Due    Diabetes Care Dilated Eye Exam Never done    Colorectal Cancer Screening Never done    Pneumococcal Vaccine: 50+ Years Never done    Lung Cancer Screening Never  done    DEXA Scan Never done    Zoster Vaccines 10/27/2022    COVID-19 Vaccine 09/01/2024    Annual Well Visit Never done    Annual Depression Screening Never done    Fall Risk Screening (Annual) 01/01/2025    Diabetes Care: Foot Exam (Annual) 01/01/2025    Tobacco Cessation Counseling Never done    Diabetes Care: Microalb/Creat Ratio (Annual) 01/01/2025       ALLERGIES  Allergies[1]    MEDICATIONS  Current Medications[2]    ACTIVE PROBLEM  Problem List[3]    PAST MEDICAL HISTORY  Past Medical History[4]    PAST SOCIAL HISTORY  Social History     Socioeconomic History    Marital status:      Spouse name: Not on file    Number of children: Not on file    Years of education: Not on file    Highest education level: Not on file   Occupational History    Not on file   Tobacco Use    Smoking status: Every Day     Current packs/day: 1.50     Average packs/day: 1 pack/day for 59.3 years (62.0 ttl pk-yrs)     Types: Cigarettes     Start date: 1966    Smokeless tobacco: Current   Vaping Use    Vaping status: Some Days    Substances: Nicotine   Substance and Sexual Activity    Alcohol use: Yes     Comment: 1 to 2 times a year.    Drug use: Never    Sexual activity: Not Currently     Partners: Male   Other Topics Concern    Not on file   Social History Narrative    Relationships: . Brother - Aneesh Bullard    Children: Donya, Serafin, Denis (3 Adult)    Pets: Daughter's 2 dogs tentatively    School: N/A    Work: Retired - /customer service    Origin: From [place], lives in [place].     Interests: Enjoys sewing (difficulty to do now), shopping, spending time with friends and family, swimming    Spiritual: Mu-ism      Social Drivers of Health     Food Insecurity: Not at Risk (8/2/2024)    [facility]    Food Insecurity     Food: 1   Transportation Needs: No Transportation Needs (12/3/2024)    Transportation Needs     Lack of Transportation: No     Car Seat: Not on file   Stress: Not on File  (10/6/2022)    Received from [facility]    Stress     Stress: 0   Housing Stability: Not at Risk (2024)    Received from [facility]    Housing Stability     Housin       CAGE Alcohol Screen:   CAGE screening score of 0 on 2025, showing low risk of alcohol abuse.          PAST SURGICAL HISTORY  Past Surgical History[5]    PAST FAMILY HISTORY  Family History[6]    PHYSICAL EXAM  Vitals:    25 1323   BP: 134/86   Pulse: 111   Temp: 97.2 °F (36.2 °C)   SpO2: 96%   Weight: 181 lb (82.1 kg)   Height: 5' 5\" (1.651 m)      Body mass index is 30.12 kg/m².    Medicare Hearing Assessment:  Hearing Screening    Time taken: 2025  2:38 PM  Entry User: Johnny Westfall MD  Screening Method: Finger Rub  Finger Rub Result: Fail       Visual Acuity:   Visual Acuity:   Right Eye Visual Acuity: Uncorrected     Left Eye Visual Acuity: Uncorrected     Both Eyes Visual Acuity: Uncorrected Both Eyes Chart Acuity: 20/20   Able To Tolerate Visual Acuity: Yes          GENERAL: NAD, smells strongly of smoke  HEENT: Moist mucous membranes, no tonsillar swelling or erythema, PERRLA bilat, TM translucent and non-bulging  NECK: Supple, non-tender  RESP: CTAB, no wheezing, no rales, no rhonchi  CV: RRR, no murmurs  GI: Soft, non-distended, non-tender, no guarding, no rebound, no masses  MSK: No edema. No C, T, L spine tenderness. Bilateral barefoot skin diabetic exam completed. Visualized feet and the appearance is normal. Bilateral monofilament/sensation of both feet is decreased throughout the bilateral feet except in the left 4th and 5th digits and right 1first, 3rd and 5th digits. Pulsation pedal pulse exam of both lower legs/feet is normal.  SKIN: Warm and dry, no rashes  NEURO: Answering questions appropriately    LABS    Lab Results   Component Value Date    WBC 9.5 2024    HGB 13.8 2024     2024       Lab Results   Component Value Date    AST 22 2024    ALT 18 2024    CA 10.4 2024     ALB 4.7 12/03/2024    CREATSERUM 1.66 (H) 12/03/2024    GLU 97 12/03/2024       Lab Results   Component Value Date    CHOLEST 214 (H) 12/03/2024    HDL 38 (L) 12/03/2024    LDL  12/03/2024      Comment:         LDL cholesterol not calculated. Triglyceride levels  greater than 400 mg/dL invalidate calculated LDL results.     Reference range: <100     Desirable range <100 mg/dL for primary prevention;    <70 mg/dL for patients with CHD or diabetic patients   with > or = 2 CHD risk factors.     LDL-C is now calculated using the Marycarmen   calculation, which is a validated novel method providing   better accuracy than the Friedewald equation in the   estimation of LDL-C.   Ganesh SS et al. ISAAC. 2013;310(19): 3504-5956   (http://education.[employer]/faq/URF573)      TRIG 452 (H) 12/03/2024         DIAGNOSTICS    ASSESSMENT/PLAN  Problem List Items Addressed This Visit          HCC Problems    Abdominal aortic aneurysm (AAA) without rupture    Patient with an abdominal aortic aneurysm  Advise continue to follow with vascular surgery  Start aspirin 81 mg daily as per advised by vascular surgery         Relevant Medications    fenofibrate 145 MG Oral Tab    aspirin (ASPIRIN 81) 81 MG Oral Tab EC    Chronic obstructive pulmonary disease (HCC)    Patient with COPD.  Restart Advair 250-50 mcg 1 puff twice daily.  Use albuterol as needed.  Defers on Prevnar 20 vaccine.         Relevant Medications    fluticasone-salmeterol 250-50 MCG/ACT Inhalation Aerosol Powder, Breath Activated    Diabetic polyneuropathy associated with type 2 diabetes mellitus (HCC)    Patient with diabetic neuropathy  Currently using gabapentin 800 mg in the morning, 400 mg the afternoon and 800 mg at night.  Needs to maintain diabetic control         Relevant Medications    gabapentin 400 MG Oral Cap    insulin glargine (LANTUS SOLOSTAR) 100 UNIT/ML Subcutaneous Solution Pen-injector    Stage 3a chronic kidney disease (HCC)    Patient  with chronic kidney disease.  Strongly advised avoiding nephrotoxic medications including oral NSAIDs.  Tylenol as needed for pain.         Relevant Medications    fenofibrate 145 MG Oral Tab    allopurinol 100 MG Oral Tab    aspirin (ASPIRIN 81) 81 MG Oral Tab EC    Type 2 diabetes mellitus with stage 3a chronic kidney disease, without long-term current use of insulin ([facility])    Patient with diabetes with complications of chronic kidney disease, diabetic neuropathy.  Goal A1c less than 7.  Diabetes is controlled at this time.   Currently on metformin 1000 mg twice daily and degludec 10 units nightly.  Difficulty with insurance coverage of degludec.  Can try lantus 10 units nightly instead.  Notify me if there is a better covered insulin.   Monitor blood sugars at home.  Prevnar 20  Follow up in 6 months         Relevant Medications    fenofibrate 145 MG Oral Tab    insulin glargine (LANTUS SOLOSTAR) 100 UNIT/ML Subcutaneous Solution Pen-injector    Other Relevant Orders    POC Glycohemoglobin [21207] (Completed)    Microalb/Creat Ratio, Random Urine    OPHTHALMOLOGY - EXTERNAL       Cardiac and Vasculature    Hyperlipidemia    Patient with hyperlipidemia with hypertriglyceridemia.  She notes that she did not tolerate atorvastatin rosuvastatin and pravastatin in the past due to myalgias  Restart fenofibrate 145 mg daily  See cardiology to discuss treatment options.          Relevant Medications    fenofibrate 145 MG Oral Tab    insulin glargine (LANTUS SOLOSTAR) 100 UNIT/ML Subcutaneous Solution Pen-injector    aspirin (ASPIRIN 81) 81 MG Oral Tab EC    Primary hypertension    Patient with hypertension.  Blood pressures are controlled in the office.  Continue amlodipine 10 mg nightly            Neuro    Spinal stenosis of lumbar region with neurogenic claudication    Patient with spinal stenosis and L3 lumbar fracture.  She is status post Laminotomy with decompression of nerve roots, including partial facetectomy,  foraminotomy, and excision of herniated intervertebral disc.  She is feeling much better at this time.  DEXA ordered.         Relevant Medications    gabapentin 400 MG Oral Cap       Gastrointestinal and Abdominal    Abdominal discomfort    Patient with a history abdominal discomfort.  She has seen GI in the past for evaluation and states that she has had an EGD and colonoscopy completed.  Advise getting me the name of the GI specialist so that I can request records  Currently she is taking pantoprazole 40 mg daily.  Pending record results, consider referral back to GI if needed.            Musculoskeletal and Injuries    Chronic gout without tophus    Patient with a history of chronic gout.  Currently on allopurinol 200 mg in the morning and 100 mg at night         Relevant Medications    gabapentin 400 MG Oral Cap    allopurinol 100 MG Oral Tab    aspirin (ASPIRIN 81) 81 MG Oral Tab EC       Allergies and Adverse Reactions    Statin intolerance    Patient with hyperlipidemia with hypertriglyceridemia.  She notes that she did not tolerate atorvastatin rosuvastatin and pravastatin in the past due to myalgias  Restart fenofibrate 145 mg daily  See cardiology to discuss treatment options.          Relevant Orders    CARDIO - INTERNAL       EarNoseThroat    Bilateral hearing loss    Referral to ENT         Relevant Orders    ENT - INTERNAL       Tobacco    Cigarette smoker    Smoking cessation advised.  CT lung screen  Prevnar 20         Relevant Orders    CT LUNG LD SCREENING(CPT=71271)    PCV20 VACCINE FOR INTRAMUSCULAR USE (Completed)    Tobacco Use Counseling 3-10 Min [89820]       Health Encounters    Health maintenance examination - Primary    Exercise and diet advised.  Shingles vaccine advised  Prevnar 20 today  COVID vaccine advised.  Advanced directive information provided.  Colonoscopy - need name or report  CT lung screen ordered.  DEXA scan ordered.          Other Visit Diagnoses         Asymptomatic  menopausal state        Relevant Orders    XR DEXA BONE DENSITOMETRY (CPT=77080)      Encounter for annual health examination                Return in about 6 months (around 10/30/2025) for follow up.    Johnny Westfall MD  Family Medicine    4/30/2025         Supplementary Documentation:   General Health:  In the past six months, have you lost more than 10 pounds without trying?: 2 - No  Has your appetite been poor?: No  Type of Diet: Diabetic  How does the patient maintain a good energy level?: Stretching  How would you describe your daily physical activity?: Light  How would you describe your current health state?: Poor  How do you maintain positive mental well-being?: Social Interaction, Puzzles, Visiting Family  On a scale of 0 to 10, with 0 being no pain and 10 being severe pain, what is your pain level?: 8 - (Severe)  In the past six months, have you experienced urine leakage?: 1-Yes  At any time do you feel concerned for the safety/well-being of yourself and/or your children, in your home or elsewhere?: No  Have you had any immunizations at another office such as Influenza, Hepatitis B, Tetanus, or Pneumococcal?: No       Mandi Moran's SCREENING SCHEDULE   Tests on this list are recommended by your physician but may not be covered, or covered at this frequency, by your insurer.   Please check with your insurance carrier before scheduling to verify coverage.   PREVENTATIVE SERVICES FREQUENCY &  COVERAGE DETAILS LAST COMPLETION DATE   Diabetes Screening    Fasting Blood Sugar /  Glucose    One screening every 12 months if never tested or if previously tested but not diagnosed with pre-diabetes   One screening every 6 months if diagnosed with pre-diabetes Lab Results   Component Value Date    GLU 97 12/03/2024        Cardiovascular Disease Screening    Lipid Panel  Cholesterol  Lipoprotein (HDL)  Triglycerides Covered every 5 years for all Medicare beneficiaries without apparent signs or symptoms of  cardiovascular disease Lab Results   Component Value Date    CHOLEST 214 (H) 12/03/2024    HDL 38 (L) 12/03/2024    LDL  12/03/2024      Comment:         LDL cholesterol not calculated. Triglyceride levels  greater than 400 mg/dL invalidate calculated LDL results.     Reference range: <100     Desirable range <100 mg/dL for primary prevention;    <70 mg/dL for patients with CHD or diabetic patients   with > or = 2 CHD risk factors.     LDL-C is now calculated using the Marycarmen   calculation, which is a validated novel method providing   better accuracy than the Friedewald equation in the   estimation of LDL-C.   Ganesh NGUYEN et al. ISAAC. 2013;310(19): 5753-9765   (http://education.[employer]/faq/EGJ814)      TRIG 452 (H) 12/03/2024         Electrocardiogram (EKG)   Covered if needed at Welcome to Medicare, and non-screening if indicated for medical reasons -      Ultrasound Screening for Abdominal Aortic Aneurysm (AAA) Covered once in a lifetime for one of the following risk factors    Men who are 65-75 years old and have ever smoked    Anyone with a family history 02/26/2025     Colorectal Cancer Screening  Covered for ages 50-85; only need ONE of the following:    Colonoscopy   Covered every 10 years    Covered every 2 years if patient is at high risk or previous colonoscopy was abnormal -    Health Maintenance   Topic Date Due    Colorectal Cancer Screening  Never done       Flexible Sigmoidoscopy   Covered every 4 years -    Fecal Occult Blood Test Covered annually -   Bone Density Screening    Bone density screening    Covered every 2 years after age 65 if diagnosed with risk of osteoporosis or estrogen deficiency.    Covered yearly for long-term glucocorticoid medication use (Steroids) No results found for this or any previous visit.      Health Maintenance Due   Topic Date Due    DEXA Scan  Never done      Pap and Pelvic    Pap   Covered every 2 years for women at normal risk; Annually if at  high risk -  No recommendations at this time    Chlamydia Annually if high risk -  No recommendations at this time   Screening Mammogram    Mammogram     Recommend annually for all female patients aged 40 and older    One baseline mammogram covered for patients aged 35-39 02/14/2025    Health Maintenance   Topic Date Due    Mammogram  02/14/2026       Immunizations    Influenza Covered once per flu season  Please get every year -  No recommendations at this time    Pneumococcal Each vaccine ([id number] & [id number]) covered once after 65 Prevnar 13: -    [id number]: -     No recommendations at this time    Hepatitis B One screening covered for patients with certain risk factors   -  No recommendations at this time    Tetanus Toxoid Not covered by Medicare Part B unless medically necessary (cut with metal); may be covered with your pharmacy prescription benefits -    Tetanus, Diptheria and Pertusis TD and TDaP Not covered by Medicare Part B -  No recommendations at this time    Zoster Not covered by Medicare Part B; may be covered with your pharmacy  prescription benefits -  Zoster Vaccines(2 of 2) due on 10/27/2022     Diabetes      Hemoglobin A1C Annually; if last result is elevated, may be asked to retest more frequently.    Medicare covers every 3 months Lab Results   Component Value Date    A1C 6.1 (A) 04/30/2025       No recommendations at this time    Creat/alb ratio Annually No results found for: \"MICROALBCREA\", \"MALBCRECALC\"    LDL Annually Lab Results   Component Value Date    LDL  12/03/2024      Comment:         LDL cholesterol not calculated. Triglyceride levels  greater than 400 mg/dL invalidate calculated LDL results.     Reference range: <100     Desirable range <100 mg/dL for primary prevention;    <70 mg/dL for patients with CHD or diabetic patients   with > or = 2 CHD risk factors.     LDL-C is now calculated using the Ganesh-Juarez   calculation, which is a validated novel method providing    better accuracy than the Friedewald equation in the   estimation of LDL-C.   Ganesh SS et al. ISAAC. 2013;310(19): 8727-0981   (http://education.[employer]/faq/YIJ782)         Dilated Eye Exam Annually Last Diabetic Eye Exam:  No data recorded  No data recorded       Annual Monitoring of Persistent Medications (ACE/ARB, digoxin diuretics, anticonvulsants)    Potassium Annually Lab Results   Component Value Date    K 4.7 12/03/2024         Creatinine   Annually Lab Results   Component Value Date    CREATSERUM 1.66 (H) 12/03/2024         BUN Annually Lab Results   Component Value Date    BUN 44 (H) 12/03/2024       Drug Serum Conc Annually No results found for: \"DIGOXIN\", \"DIG\", \"VALP\"           Chronic Obstructive Pulmonary Disease (COPD)    Spirometry Annually Spirometry date:        Tobacco cessation counseling for <3 minutes.         [1]   Allergies  Allergen Reactions    Atorvastatin OTHER (SEE COMMENTS)     Muscle aches    Nickel RASH   [2]   Current Outpatient Medications   Medication Sig Dispense Refill    gabapentin 400 MG Oral Cap Gabapentin 800 mg in the morning, 400 mg in the afternoon, 800 mg at night 360 capsule 3    fenofibrate 145 MG Oral Tab Take 1 tablet (145 mg total) by mouth daily. 90 tablet 1    fluticasone-salmeterol 250-50 MCG/ACT Inhalation Aerosol Powder, Breath Activated Inhale 1 puff into the lungs 2 (two) times daily. 60 each 3    insulin glargine (LANTUS SOLOSTAR) 100 UNIT/ML Subcutaneous Solution Pen-injector Inject 10 Units into the skin nightly. 15 mL 3    allopurinol 100 MG Oral Tab Allopurinol 200 mg in the morning and 100 mg at night 270 tablet 3    aspirin (ASPIRIN 81) 81 MG Oral Tab EC Take 1 tablet (81 mg total) by mouth daily. 90 tablet 0    amLODIPine 10 MG Oral Tab Take 1 tablet (10 mg total) by mouth daily.      albuterol 108 (90 Base) MCG/ACT Inhalation Aero Soln every 28 days. FOR 21 DAYS IN, THEN REMOVE FOR 7 DAYS      insulin degludec 200 unit/mL Subcutaneous  Solution Pen-injector Inject 10 Units into the skin nightly. 12 mL 3    metFORMIN HCl 1000 MG Oral Tab Take 1 tablet (1,000 mg total) by mouth 2 (two) times daily with meals. 180 tablet 1    BD PEN NEEDLE MINI U/F 31G X 5 MM Does not apply Misc 1 kit 2 (two) times daily.      Lancets (ONETOUCH DELICA PLUS [id number]) Does not apply Misc Apply 1 Application topically 2 (two) times daily.      pantoprazole 40 MG Oral Tab EC TAKE 1 TABLET BY MOUTH EVERY MORNING BEFORE BREAKFAST FOR 90 DAYS      Cholecalciferol (VITAMIN D3) 250 MCG (47687 UT) Oral Cap 250 mcg.      Ascorbic Acid (VITAMIN C) 500 MG Oral Cap 500 mg.      B Complex Vitamins (PA B-COMPLEX WITH B-12 OR) PO, Daily, 0 Refill(s)      acetaminophen (TYLENOL EXTRA STRENGTH) 500 MG Oral Tab 2 tablets (1,000 mg total).      APPLE CIDER VINEGAR OR Take 450 mg by mouth.     [3]   Patient Active Problem List  Diagnosis    Abdominal discomfort    Type 2 diabetes mellitus with stage 3a chronic kidney disease, without long-term current use of insulin (HCC)    Hyperlipidemia    Primary hypertension    Stage 3a chronic kidney disease (HCC)    Chronic obstructive pulmonary disease (HCC)    Chronic gout without tophus    Diabetic polyneuropathy associated with type 2 diabetes mellitus (HCC)    Cigarette smoker    Health maintenance examination    Spinal stenosis of lumbar region with neurogenic claudication    Abdominal aortic aneurysm (AAA) without rupture    Statin intolerance    Bilateral hearing loss   [4]   Past Medical History:   Arthritis    COPD (chronic obstructive pulmonary disease) (HCC)    Diabetes (HCC)    Essential hypertension    Gout    Hyperlipidemia   [5]   Past Surgical History:  Procedure Laterality Date          Cholecystectomy      Laminectomy  2024    Laminotomy with decompression of nerve roots, including partial facetectomy, foraminotomy, and excision of herniated intervertebral disc   [6]   Family History  Problem Relation Age of Onset     Skin cancer Mother     Depression Mother     Diabetes Mother     Cancer Father     Suicide History Father     Skin cancer Sister     Depression Brother     Cataracts Brother     No Known Problems Maternal Grandmother     Kidney Disease Maternal Grandfather     No Known Problems Paternal Grandmother     No Known Problems Paternal Grandfather     Breast Cancer Neg     Colon Cancer Neg

## 2025-04-30 NOTE — PATIENT INSTRUCTIONS
PATIENT INSTRUCTIONS    Thank you for seeing me today, it was a pleasure taking care of you.  Please check out at the  and schedule a follow up appointment.  Return in about 6 months (around 10/30/2025) for follow up.  Please remember that the preferred luis m period for appointments is 5 minutes. This is to help maximize the amount of time that we can spend together at our visits.    The following imaging studies were ordered: CT lung screen, DEXA scan, CT abd (for vascular), Ultrasound (for vascular  Please call [phone] to schedule your imaging appointment.   Please also follow up with the following specialists: Vascular, ophthalmology , cardiology - discuss cholesterol control, ENT  Please fill out the advance directive information (power of  documents) and bring a copy to our clinic.  Need to get me the colonoscopy report or name of specialist   Can try alternative lantus 10 U nightly. If needing alternative insulin can let me know what is better covered   Need to quit smoking   Start aspirin 81 mg daily    Best,   Dr. Westfall      Quitting Smoking    Quitting smoking is the most important step you can take to improve your health. We're glad you have set a goal to improve your health.    Quit Smoking Resources    In addition to medications, use the STAR plan to help you successfully quit.   Stick with your quit date!   Tell friends, family, and coworkers your quit date. Request their understanding and support.  Anticipate and prepare for challenges. Some examples are withdrawal symptoms, being around others who smoke, and drinking alcohol.  Remove all tobacco products and paraphernalia from your environment. Make your home and vehicles smoke-free.    Free resources for additional support:  National tobacco quitline: 1-800-QUIT-NOW (1-300.622.5347).  SmokefreeTXT is a free text program to assist you in quitting. Visit https://www.smokefree.gov/smokefreetxt for more information.  Feel free to  call your care manager at ([phone]) for additional support.

## 2025-04-30 NOTE — ASSESSMENT & PLAN NOTE
Patient with hyperlipidemia with hypertriglyceridemia.  She notes that she did not tolerate atorvastatin rosuvastatin and pravastatin in the past due to myalgias  Restart fenofibrate 145 mg daily  See cardiology to discuss treatment options.

## 2025-04-30 NOTE — ASSESSMENT & PLAN NOTE
Patient with a history abdominal discomfort.  She has seen GI in the past for evaluation and states that she has had an EGD and colonoscopy completed.  Advise getting me the name of the GI specialist so that I can request records  Currently she is taking pantoprazole 40 mg daily.  Pending record results, consider referral back to GI if needed.

## 2025-04-30 NOTE — ASSESSMENT & PLAN NOTE
Patient with diabetic neuropathy  Currently using gabapentin 800 mg in the morning, 400 mg the afternoon and 800 mg at night.  Needs to maintain diabetic control

## 2025-04-30 NOTE — ASSESSMENT & PLAN NOTE
Patient with an abdominal aortic aneurysm  Advise continue to follow with vascular surgery  Start aspirin 81 mg daily as per advised by vascular surgery

## 2025-04-30 NOTE — ASSESSMENT & PLAN NOTE
Patient with COPD.  Restart Advair 250-50 mcg 1 puff twice daily.  Use albuterol as needed.  Defers on Prevnar 20 vaccine.

## 2025-05-01 ENCOUNTER — NURSE ONLY (OUTPATIENT)
Facility: CLINIC | Age: 70
End: 2025-05-01

## 2025-05-01 DIAGNOSIS — I65.29 CAROTID ATHEROSCLEROSIS, UNSPECIFIED LATERALITY: ICD-10-CM

## 2025-05-01 PROCEDURE — 93880 EXTRACRANIAL BILAT STUDY: CPT | Performed by: SURGERY

## 2025-05-05 NOTE — PROGRESS NOTES
Results of the carotid duplex were communicated to the pt via [employer]. Patient was asked to make an appointment to discuss this further.  She will need a CTA of the neck to better evaluate the innominate artery.

## 2025-05-07 ENCOUNTER — HOSPITAL ENCOUNTER (OUTPATIENT)
Dept: CT IMAGING | Age: 70
Discharge: HOME | End: 2025-05-07
Attending: SURGERY
Payer: MEDICARE

## 2025-05-07 DIAGNOSIS — I71.43 INFRARENAL ABDOMINAL AORTIC ANEURYSM (AAA) WITHOUT RUPTURE: ICD-10-CM

## 2025-05-07 DIAGNOSIS — I65.29 CAROTID ATHEROSCLEROSIS, UNSPECIFIED LATERALITY: ICD-10-CM

## 2025-05-07 LAB
CREAT BLD-MCNC: 1.4 MG/DL (ref 0.55–1.02)
EGFRCR SERPLBLD CKD-EPI 2021: 40 ML/MIN/1.73M2 (ref 60–?)

## 2025-05-07 PROCEDURE — 82565 ASSAY OF CREATININE: CPT

## 2025-05-07 PROCEDURE — 75635 CT ANGIO ABDOMINAL ARTERIES: CPT | Performed by: SURGERY

## 2025-05-20 ENCOUNTER — OFFICE VISIT (OUTPATIENT)
Facility: CLINIC | Age: 70
End: 2025-05-20

## 2025-05-20 VITALS
SYSTOLIC BLOOD PRESSURE: 132 MMHG | BODY MASS INDEX: 30.16 KG/M2 | DIASTOLIC BLOOD PRESSURE: 78 MMHG | HEIGHT: 65 IN | WEIGHT: 181 LBS

## 2025-05-20 DIAGNOSIS — I71.43 INFRARENAL ABDOMINAL AORTIC ANEURYSM (AAA) WITHOUT RUPTURE: Primary | ICD-10-CM

## 2025-05-20 PROCEDURE — 1125F AMNT PAIN NOTED PAIN PRSNT: CPT | Performed by: SURGERY

## 2025-05-20 PROCEDURE — 99214 OFFICE O/P EST MOD 30 MIN: CPT | Performed by: SURGERY

## 2025-05-22 NOTE — PROGRESS NOTES
Samer F. Najjar, MD  Vascular Surgery  [facility]      VASCULAR SURGERY   CLINIC NOTE        Name: Mandi Moran   :   1955  [id number]     REFERRING PHYSICIAN:  Johnny Westfall  PRIMARY CARE PHYSICIAN:  Johnny Westfall MD    HISTORY OF PRESENT ILLNESS:   Patient is a 70 year old female who is here to discuss her abdominal aortic aneurysm treatment.  The patient had a CT angiogram recently that revealed that the size of the aneurysm that has reached 5.1 cm.  He is here to discuss different treatment options.  She denies any chest pain.  She is able to go up multiple flights of the stairs.  She walks her dog on a daily basis.  She continues to smoke and is not ready to stop.  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  She does not have any arm claudication or steal symptoms.    PAST MEDICAL HISTORY:    Past Medical History[1]    PAST SURGICAL HISTORY:   Past Surgical History[2]     MEDICATIONS:   Medications - Current[3]    ALLERGIES:    She is allergic to atorvastatin and nickel.    SOCIAL HISTORY:    Patient  reports that she has been smoking cigarettes. She started smoking about 59 years ago. She has a 62.1 pack-year smoking history. She uses smokeless tobacco. She reports current alcohol use. She reports that she does not use drugs.    FAMILY HISTORY:    Patient's family history includes Cancer in her father; Cataracts in her brother; Depression in her brother and mother; Diabetes in her mother; Kidney Disease in her maternal grandfather; No Known Problems in her maternal grandmother, paternal grandfather, and paternal grandmother; Skin cancer in her mother and sister; Suicide History in her father.    ROS:     A 12 point review of systems with pertinent positives and negatives listed in the HPI.    EXAM:    BP  132/78   Ht 5' 5\" (1.651 m)   Wt 181 lb (82.1 kg)   BMI 30.12 kg/m²     RESPIRATORY: no rales, rhonchi, or wheezes B  CARDIO: RRR without murmur, no murmur, no gallop   ABDOMEN: soft, non-tender   VASCULAR: Pulses not examined today          IMAGING:   The CT angiogram was reviewed with the patient    ASSESSMENT  Diagnoses and all orders for this visit:    Infrarenal abdominal aortic aneurysm (AAA) without rupture         I explained to the patient that based on her CT angiogram of the abdomen and pelvis the aneurysm has crossed the size of 5 cm,  I would recommend consideration for repair.  The aneurysm is amenable to endovascular repair.  The risks of endovascular repair included: access site complications (both open and percutaneous), endoleaks (all types), device migration, separation of components, limb kinking and occlusion, endograft infection, in addition to cardiopulmonary complications, intravenous contrast complications (both contrast-induced nephropathy and allergic reactions) and ischemic complications (renal, intestinal, extremity, pelvic and spinal) including conversion to an open procedure and death.  We also discussed the signs and symptoms of a ruptured aneurysm with the importance of proceeding to the emergency room in that event.  All questions were answered.  The patient was to proceed with repair.   We will address her innominate artery stenosis later after repair of her aneurysm.       PLAN:  Endovascular abdominal aortic aneurysm repair using the gore device        Sincerely,  Samer F. Najjar MD    Please note: Dragon speech recognition software was used to prepare this note. If a word or phrase is confusing, it is likely do to a failure of recognition.   Please contact me with any questions or clarifications.       [1]   Past Medical History:   Arthritis    COPD (chronic obstructive pulmonary disease) (HCC)    Diabetes (HCC)    Essential hypertension    Gout    Hyperlipidemia   [2]   Past  Surgical History:  Procedure Laterality Date          Cholecystectomy      Laminectomy  2024    Laminotomy with decompression of nerve roots, including partial facetectomy, foraminotomy, and excision of herniated intervertebral disc   [3]   Current Outpatient Medications:     gabapentin 400 MG Oral Cap, Gabapentin 800 mg in the morning, 400 mg in the afternoon, 800 mg at night, Disp: 360 capsule, Rfl: 3    fenofibrate 145 MG Oral Tab, Take 1 tablet (145 mg total) by mouth daily., Disp: 90 tablet, Rfl: 1    fluticasone-salmeterol 250-50 MCG/ACT Inhalation Aerosol Powder, Breath Activated, Inhale 1 puff into the lungs 2 (two) times daily., Disp: 60 each, Rfl: 3    insulin glargine (LANTUS SOLOSTAR) 100 UNIT/ML Subcutaneous Solution Pen-injector, Inject 10 Units into the skin nightly., Disp: 15 mL, Rfl: 3    allopurinol 100 MG Oral Tab, Allopurinol 200 mg in the morning and 100 mg at night, Disp: 270 tablet, Rfl: 3    aspirin (ASPIRIN 81) 81 MG Oral Tab EC, Take 1 tablet (81 mg total) by mouth daily., Disp: 90 tablet, Rfl: 0    insulin degludec 200 unit/mL Subcutaneous Solution Pen-injector, Inject 10 Units into the skin nightly., Disp: 12 mL, Rfl: 3    metFORMIN HCl 1000 MG Oral Tab, Take 1 tablet (1,000 mg total) by mouth 2 (two) times daily with meals., Disp: 180 tablet, Rfl: 1    amLODIPine 10 MG Oral Tab, Take 1 tablet (10 mg total) by mouth daily., Disp: , Rfl:     BD PEN NEEDLE MINI U/F 31G X 5 MM Does not apply Misc, 1 kit 2 (two) times daily., Disp: , Rfl:     Lancets (ONETOUCH DELICA PLUS [id number]) Does not apply Misc, Apply 1 Application topically 2 (two) times daily., Disp: , Rfl:     pantoprazole 40 MG Oral Tab EC, TAKE 1 TABLET BY MOUTH EVERY MORNING BEFORE BREAKFAST FOR 90 DAYS, Disp: , Rfl:     Cholecalciferol (VITAMIN D3) 250 MCG (63396 UT) Oral Cap, 250 mcg., Disp: , Rfl:     Ascorbic Acid (VITAMIN C) 500 MG Oral Cap, 500 mg., Disp: , Rfl:     B Complex Vitamins (PA B-COMPLEX WITH B-12  OR), PO, Daily, 0 Refill(s), Disp: , Rfl:     albuterol 108 (90 Base) MCG/ACT Inhalation Aero Soln, every 28 days. FOR 21 DAYS IN, THEN REMOVE FOR 7 DAYS, Disp: , Rfl:     acetaminophen (TYLENOL EXTRA STRENGTH) 500 MG Oral Tab, 2 tablets (1,000 mg total)., Disp: , Rfl:     APPLE CIDER VINEGAR OR, Take 450 mg by mouth., Disp: , Rfl:

## 2025-05-29 ENCOUNTER — TELEPHONE (OUTPATIENT)
Facility: CLINIC | Age: 70
End: 2025-05-29

## 2025-05-29 NOTE — TELEPHONE ENCOUNTER
Patient needs EVAR procedure.    Called and spoke with patient. Procedure scheduled for 06/27. She understood. Let her know that a nurse from the Cath Lab will be calling to provide further instructions regarding procedure. She understood. Let her know information regarding procedure was sent via [employer] as well. She was receptive.

## 2025-05-29 NOTE — TELEPHONE ENCOUNTER
Patient needs EVAR with GORE procedure.    Case initiated through [employer] portal. Pending clinical review.  Clinicals uploaded to portal.  Tracking #[id number]

## 2025-06-20 ENCOUNTER — NURSE ONLY (OUTPATIENT)
Dept: LAB | Age: 70
End: 2025-06-20
Attending: SURGERY
Payer: MEDICARE

## 2025-06-20 DIAGNOSIS — Z01.818 PREOP TESTING: ICD-10-CM

## 2025-06-20 LAB
ATRIAL RATE: 74 BPM
P AXIS: 64 DEGREES
P-R INTERVAL: 152 MS
Q-T INTERVAL: 376 MS
QRS DURATION: 70 MS
QTC CALCULATION (BEZET): 417 MS
R AXIS: 4 DEGREES
T AXIS: 59 DEGREES
VENTRICULAR RATE: 74 BPM

## 2025-06-20 PROCEDURE — 86920 COMPATIBILITY TEST SPIN: CPT

## 2025-06-20 PROCEDURE — 93010 ELECTROCARDIOGRAM REPORT: CPT | Performed by: INTERNAL MEDICINE

## 2025-06-20 PROCEDURE — 93005 ELECTROCARDIOGRAM TRACING: CPT

## 2025-06-27 ENCOUNTER — ANESTHESIA EVENT (OUTPATIENT)
Dept: INTERVENTIONAL RADIOLOGY/VASCULAR | Facility: HOSPITAL | Age: 70
End: 2025-06-27
Payer: MEDICARE

## 2025-06-27 ENCOUNTER — APPOINTMENT (OUTPATIENT)
Dept: CT IMAGING | Facility: HOSPITAL | Age: 70
DRG: 269 | End: 2025-06-27
Attending: SURGERY
Payer: MEDICARE

## 2025-06-27 ENCOUNTER — HOSPITAL ENCOUNTER (INPATIENT)
Dept: INTERVENTIONAL RADIOLOGY/VASCULAR | Facility: HOSPITAL | Age: 70
LOS: 6 days | Discharge: NURSING, REHAB OR LONG-TERM CARE | DRG: 269 | End: 2025-07-03
Attending: SURGERY | Admitting: SURGERY
Payer: MEDICARE

## 2025-06-27 DIAGNOSIS — T79.A0XA COMPARTMENT SYNDROME: ICD-10-CM

## 2025-06-27 DIAGNOSIS — I71.43 INFRARENAL ABDOMINAL AORTIC ANEURYSM (AAA) WITHOUT RUPTURE: ICD-10-CM

## 2025-06-27 DIAGNOSIS — M79.A22 NONTRAUMATIC COMPARTMENT SYNDROME OF LEFT LOWER EXTREMITY: ICD-10-CM

## 2025-06-27 DIAGNOSIS — Z01.818 PREOP TESTING: Primary | ICD-10-CM

## 2025-06-27 PROBLEM — E11.42 DIABETIC POLYNEUROPATHY ASSOCIATED WITH TYPE 2 DIABETES MELLITUS (HCC): Status: RESOLVED | Noted: 2024-11-20 | Resolved: 2025-06-27

## 2025-06-27 LAB
CREAT BLD-MCNC: 1.1 MG/DL (ref 0.55–1.02)
EGFRCR SERPLBLD CKD-EPI 2021: 54 ML/MIN/1.73M2 (ref 60–?)
GLUCOSE BLDC GLUCOMTR-MCNC: 118 MG/DL (ref 70–99)
GLUCOSE BLDC GLUCOMTR-MCNC: 142 MG/DL (ref 70–99)
GLUCOSE BLDC GLUCOMTR-MCNC: 150 MG/DL (ref 70–99)
GLUCOSE BLDC GLUCOMTR-MCNC: 155 MG/DL (ref 70–99)
RH BLOOD TYPE: POSITIVE

## 2025-06-27 PROCEDURE — 75635 CT ANGIO ABDOMINAL ARTERIES: CPT | Performed by: SURGERY

## 2025-06-27 PROCEDURE — B41C1ZZ FLUOROSCOPY OF PELVIC ARTERIES USING LOW OSMOLAR CONTRAST: ICD-10-PCS | Performed by: SURGERY

## 2025-06-27 PROCEDURE — 04CL0ZZ EXTIRPATION OF MATTER FROM LEFT FEMORAL ARTERY, OPEN APPROACH: ICD-10-PCS | Performed by: SURGERY

## 2025-06-27 PROCEDURE — 99223 1ST HOSP IP/OBS HIGH 75: CPT | Performed by: HOSPITALIST

## 2025-06-27 PROCEDURE — 04UL0KZ SUPPLEMENT LEFT FEMORAL ARTERY WITH NONAUTOLOGOUS TISSUE SUBSTITUTE, OPEN APPROACH: ICD-10-PCS | Performed by: SURGERY

## 2025-06-27 PROCEDURE — 04V03DZ RESTRICTION OF ABDOMINAL AORTA WITH INTRALUMINAL DEVICE, PERCUTANEOUS APPROACH: ICD-10-PCS | Performed by: SURGERY

## 2025-06-27 PROCEDURE — B41D1ZZ FLUOROSCOPY OF AORTA AND BILATERAL LOWER EXTREMITY ARTERIES USING LOW OSMOLAR CONTRAST: ICD-10-PCS | Performed by: SURGERY

## 2025-06-27 RX ORDER — FLUTICASONE PROPIONATE AND SALMETEROL 250; 50 UG/1; UG/1
1 POWDER RESPIRATORY (INHALATION) 2 TIMES DAILY
Status: DISCONTINUED | OUTPATIENT
Start: 2025-06-27 | End: 2025-07-03

## 2025-06-27 RX ORDER — MORPHINE SULFATE 4 MG/ML
4 INJECTION, SOLUTION INTRAMUSCULAR; INTRAVENOUS EVERY 10 MIN PRN
Status: DISCONTINUED | OUTPATIENT
Start: 2025-06-27 | End: 2025-06-27 | Stop reason: HOSPADM

## 2025-06-27 RX ORDER — HYDROMORPHONE HYDROCHLORIDE 1 MG/ML
INJECTION, SOLUTION INTRAMUSCULAR; INTRAVENOUS; SUBCUTANEOUS
Status: COMPLETED
Start: 2025-06-27 | End: 2025-06-27

## 2025-06-27 RX ORDER — PANTOPRAZOLE SODIUM 40 MG/1
40 TABLET, DELAYED RELEASE ORAL
Status: DISCONTINUED | OUTPATIENT
Start: 2025-06-28 | End: 2025-07-03

## 2025-06-27 RX ORDER — HYDROCODONE BITARTRATE AND ACETAMINOPHEN 5; 325 MG/1; MG/1
2 TABLET ORAL EVERY 4 HOURS PRN
Status: DISCONTINUED | OUTPATIENT
Start: 2025-06-27 | End: 2025-07-03

## 2025-06-27 RX ORDER — LIDOCAINE HYDROCHLORIDE 20 MG/ML
INJECTION, SOLUTION INFILTRATION; PERINEURAL
Status: COMPLETED
Start: 2025-06-27 | End: 2025-06-27

## 2025-06-27 RX ORDER — HYDROMORPHONE HYDROCHLORIDE 1 MG/ML
0.1 INJECTION, SOLUTION INTRAMUSCULAR; INTRAVENOUS; SUBCUTANEOUS EVERY 2 HOUR PRN
Refills: 0 | Status: DISCONTINUED | OUTPATIENT
Start: 2025-06-27 | End: 2025-07-03

## 2025-06-27 RX ORDER — ACETAMINOPHEN 500 MG
2000 TABLET ORAL 2 TIMES DAILY
Status: DISCONTINUED | OUTPATIENT
Start: 2025-06-27 | End: 2025-06-27

## 2025-06-27 RX ORDER — IPRATROPIUM BROMIDE AND ALBUTEROL SULFATE 2.5; .5 MG/3ML; MG/3ML
3 SOLUTION RESPIRATORY (INHALATION) ONCE
Status: COMPLETED | OUTPATIENT
Start: 2025-06-27 | End: 2025-06-27

## 2025-06-27 RX ORDER — IOPAMIDOL 612 MG/ML
200 INJECTION, SOLUTION INTRAVASCULAR
Status: COMPLETED | OUTPATIENT
Start: 2025-06-27 | End: 2025-06-27

## 2025-06-27 RX ORDER — DOCUSATE SODIUM 100 MG/1
100 CAPSULE, LIQUID FILLED ORAL 2 TIMES DAILY
Status: DISCONTINUED | OUTPATIENT
Start: 2025-06-27 | End: 2025-07-03

## 2025-06-27 RX ORDER — ACETAMINOPHEN 500 MG
1000 TABLET ORAL ONCE
Status: DISCONTINUED | OUTPATIENT
Start: 2025-06-27 | End: 2025-06-27 | Stop reason: HOSPADM

## 2025-06-27 RX ORDER — SODIUM PHOSPHATE, DIBASIC AND SODIUM PHOSPHATE, MONOBASIC 7; 19 G/230ML; G/230ML
1 ENEMA RECTAL ONCE AS NEEDED
Status: DISCONTINUED | OUTPATIENT
Start: 2025-06-27 | End: 2025-07-03

## 2025-06-27 RX ORDER — BISACODYL 10 MG
10 SUPPOSITORY, RECTAL RECTAL
Status: DISCONTINUED | OUTPATIENT
Start: 2025-06-27 | End: 2025-07-03

## 2025-06-27 RX ORDER — SODIUM CHLORIDE, SODIUM LACTATE, POTASSIUM CHLORIDE, CALCIUM CHLORIDE 600; 310; 30; 20 MG/100ML; MG/100ML; MG/100ML; MG/100ML
INJECTION, SOLUTION INTRAVENOUS CONTINUOUS
Status: DISCONTINUED | OUTPATIENT
Start: 2025-06-27 | End: 2025-06-27 | Stop reason: HOSPADM

## 2025-06-27 RX ORDER — MORPHINE SULFATE 2 MG/ML
2 INJECTION, SOLUTION INTRAMUSCULAR; INTRAVENOUS EVERY 10 MIN PRN
Status: DISCONTINUED | OUTPATIENT
Start: 2025-06-27 | End: 2025-06-27 | Stop reason: HOSPADM

## 2025-06-27 RX ORDER — FENOFIBRATE 134 MG/1
134 CAPSULE ORAL
Status: DISCONTINUED | OUTPATIENT
Start: 2025-06-28 | End: 2025-07-03

## 2025-06-27 RX ORDER — NICOTINE POLACRILEX 4 MG
15 LOZENGE BUCCAL
Status: DISCONTINUED | OUTPATIENT
Start: 2025-06-27 | End: 2025-06-27 | Stop reason: HOSPADM

## 2025-06-27 RX ORDER — NICOTINE POLACRILEX 4 MG
30 LOZENGE BUCCAL
Status: DISCONTINUED | OUTPATIENT
Start: 2025-06-27 | End: 2025-06-27 | Stop reason: HOSPADM

## 2025-06-27 RX ORDER — METOCLOPRAMIDE 10 MG/1
10 TABLET ORAL ONCE
Status: DISCONTINUED | OUTPATIENT
Start: 2025-06-27 | End: 2025-06-27 | Stop reason: HOSPADM

## 2025-06-27 RX ORDER — ALBUTEROL SULFATE 90 UG/1
1 INHALANT RESPIRATORY (INHALATION) EVERY 4 HOURS PRN
Status: DISCONTINUED | OUTPATIENT
Start: 2025-06-27 | End: 2025-07-03

## 2025-06-27 RX ORDER — INSULIN DEGLUDEC 100 U/ML
10 INJECTION, SOLUTION SUBCUTANEOUS NIGHTLY
Status: DISCONTINUED | OUTPATIENT
Start: 2025-06-27 | End: 2025-07-03

## 2025-06-27 RX ORDER — IPRATROPIUM BROMIDE AND ALBUTEROL SULFATE 2.5; .5 MG/3ML; MG/3ML
3 SOLUTION RESPIRATORY (INHALATION) EVERY 6 HOURS PRN
Status: DISCONTINUED | OUTPATIENT
Start: 2025-06-27 | End: 2025-07-03

## 2025-06-27 RX ORDER — DEXTROSE, SODIUM CHLORIDE, SODIUM LACTATE, POTASSIUM CHLORIDE, AND CALCIUM CHLORIDE 5; .6; .31; .03; .02 G/100ML; G/100ML; G/100ML; G/100ML; G/100ML
INJECTION, SOLUTION INTRAVENOUS CONTINUOUS
Status: DISCONTINUED | OUTPATIENT
Start: 2025-06-27 | End: 2025-07-01

## 2025-06-27 RX ORDER — HYDROMORPHONE HYDROCHLORIDE 1 MG/ML
0.4 INJECTION, SOLUTION INTRAMUSCULAR; INTRAVENOUS; SUBCUTANEOUS EVERY 5 MIN PRN
Status: DISCONTINUED | OUTPATIENT
Start: 2025-06-27 | End: 2025-06-27 | Stop reason: HOSPADM

## 2025-06-27 RX ORDER — FAMOTIDINE 20 MG/1
20 TABLET, FILM COATED ORAL ONCE
Status: DISCONTINUED | OUTPATIENT
Start: 2025-06-27 | End: 2025-06-27 | Stop reason: HOSPADM

## 2025-06-27 RX ORDER — HYDROCODONE BITARTRATE AND ACETAMINOPHEN 5; 325 MG/1; MG/1
1 TABLET ORAL EVERY 4 HOURS PRN
Status: DISCONTINUED | OUTPATIENT
Start: 2025-06-27 | End: 2025-07-03

## 2025-06-27 RX ORDER — ONDANSETRON 2 MG/ML
4 INJECTION INTRAMUSCULAR; INTRAVENOUS EVERY 6 HOURS PRN
Status: DISCONTINUED | OUTPATIENT
Start: 2025-06-27 | End: 2025-07-03

## 2025-06-27 RX ORDER — HYDROMORPHONE HYDROCHLORIDE 1 MG/ML
0.2 INJECTION, SOLUTION INTRAMUSCULAR; INTRAVENOUS; SUBCUTANEOUS EVERY 5 MIN PRN
Status: DISCONTINUED | OUTPATIENT
Start: 2025-06-27 | End: 2025-06-27 | Stop reason: HOSPADM

## 2025-06-27 RX ORDER — ENOXAPARIN SODIUM 100 MG/ML
40 INJECTION SUBCUTANEOUS NIGHTLY
Status: DISCONTINUED | OUTPATIENT
Start: 2025-06-27 | End: 2025-07-03

## 2025-06-27 RX ORDER — HYDROMORPHONE HYDROCHLORIDE 1 MG/ML
0.4 INJECTION, SOLUTION INTRAMUSCULAR; INTRAVENOUS; SUBCUTANEOUS EVERY 2 HOUR PRN
Refills: 0 | Status: DISCONTINUED | OUTPATIENT
Start: 2025-06-27 | End: 2025-07-03

## 2025-06-27 RX ORDER — PROTAMINE SULFATE 10 MG/ML
INJECTION, SOLUTION INTRAVENOUS AS NEEDED
Status: DISCONTINUED | OUTPATIENT
Start: 2025-06-27 | End: 2025-06-27 | Stop reason: SURG

## 2025-06-27 RX ORDER — PROTAMINE SULFATE 10 MG/ML
INJECTION, SOLUTION INTRAVENOUS
Status: COMPLETED
Start: 2025-06-27 | End: 2025-06-27

## 2025-06-27 RX ORDER — AMLODIPINE BESYLATE 10 MG/1
10 TABLET ORAL NIGHTLY
Status: DISCONTINUED | OUTPATIENT
Start: 2025-06-27 | End: 2025-07-03

## 2025-06-27 RX ORDER — NALOXONE HYDROCHLORIDE 0.4 MG/ML
0.08 INJECTION, SOLUTION INTRAMUSCULAR; INTRAVENOUS; SUBCUTANEOUS AS NEEDED
Status: DISCONTINUED | OUTPATIENT
Start: 2025-06-27 | End: 2025-06-27 | Stop reason: HOSPADM

## 2025-06-27 RX ORDER — SODIUM CHLORIDE, SODIUM LACTATE, POTASSIUM CHLORIDE, CALCIUM CHLORIDE 600; 310; 30; 20 MG/100ML; MG/100ML; MG/100ML; MG/100ML
INJECTION, SOLUTION INTRAVENOUS CONTINUOUS
Status: DISCONTINUED | OUTPATIENT
Start: 2025-06-27 | End: 2025-06-27 | Stop reason: ALTCHOICE

## 2025-06-27 RX ORDER — ASCORBIC ACID 500 MG
500 TABLET ORAL DAILY
Status: DISCONTINUED | OUTPATIENT
Start: 2025-06-28 | End: 2025-07-03

## 2025-06-27 RX ORDER — ASPIRIN 81 MG/1
81 TABLET ORAL DAILY
Status: DISCONTINUED | OUTPATIENT
Start: 2025-06-28 | End: 2025-07-03

## 2025-06-27 RX ORDER — HYDROMORPHONE HYDROCHLORIDE 1 MG/ML
0.6 INJECTION, SOLUTION INTRAMUSCULAR; INTRAVENOUS; SUBCUTANEOUS EVERY 5 MIN PRN
Status: DISCONTINUED | OUTPATIENT
Start: 2025-06-27 | End: 2025-06-27 | Stop reason: HOSPADM

## 2025-06-27 RX ORDER — HEPARIN SODIUM 1000 [USP'U]/ML
INJECTION, SOLUTION INTRAVENOUS; SUBCUTANEOUS AS NEEDED
Status: DISCONTINUED | OUTPATIENT
Start: 2025-06-27 | End: 2025-06-27 | Stop reason: SURG

## 2025-06-27 RX ORDER — ONDANSETRON 2 MG/ML
INJECTION INTRAMUSCULAR; INTRAVENOUS AS NEEDED
Status: DISCONTINUED | OUTPATIENT
Start: 2025-06-27 | End: 2025-06-27 | Stop reason: SURG

## 2025-06-27 RX ORDER — METOCLOPRAMIDE HYDROCHLORIDE 5 MG/ML
5 INJECTION INTRAMUSCULAR; INTRAVENOUS EVERY 8 HOURS PRN
Status: DISCONTINUED | OUTPATIENT
Start: 2025-06-27 | End: 2025-07-03

## 2025-06-27 RX ORDER — MAGNESIUM GLYCINATE 100 MG
CAPSULE ORAL NIGHTLY
Status: DISCONTINUED | OUTPATIENT
Start: 2025-06-27 | End: 2025-06-27 | Stop reason: RX

## 2025-06-27 RX ORDER — ALLOPURINOL 100 MG/1
100 TABLET ORAL 2 TIMES DAILY
Status: DISCONTINUED | OUTPATIENT
Start: 2025-06-27 | End: 2025-07-03

## 2025-06-27 RX ORDER — HYDROMORPHONE HYDROCHLORIDE 1 MG/ML
0.2 INJECTION, SOLUTION INTRAMUSCULAR; INTRAVENOUS; SUBCUTANEOUS EVERY 2 HOUR PRN
Refills: 0 | Status: DISCONTINUED | OUTPATIENT
Start: 2025-06-27 | End: 2025-07-03

## 2025-06-27 RX ORDER — ACETAMINOPHEN 325 MG/1
650 TABLET ORAL EVERY 6 HOURS PRN
Status: DISCONTINUED | OUTPATIENT
Start: 2025-06-27 | End: 2025-07-03

## 2025-06-27 RX ORDER — ACETAMINOPHEN 325 MG/1
650 TABLET ORAL EVERY 4 HOURS PRN
Status: DISCONTINUED | OUTPATIENT
Start: 2025-06-27 | End: 2025-06-27 | Stop reason: ALTCHOICE

## 2025-06-27 RX ORDER — FAMOTIDINE 10 MG/ML
20 INJECTION, SOLUTION INTRAVENOUS ONCE
Status: DISCONTINUED | OUTPATIENT
Start: 2025-06-27 | End: 2025-06-27 | Stop reason: HOSPADM

## 2025-06-27 RX ORDER — POLYETHYLENE GLYCOL 3350 17 G/17G
17 POWDER, FOR SOLUTION ORAL DAILY PRN
Status: DISCONTINUED | OUTPATIENT
Start: 2025-06-27 | End: 2025-07-03

## 2025-06-27 RX ORDER — NICOTINE 21 MG/24HR
1 PATCH, TRANSDERMAL 24 HOURS TRANSDERMAL DAILY
Status: DISCONTINUED | OUTPATIENT
Start: 2025-06-27 | End: 2025-07-03

## 2025-06-27 RX ORDER — METOCLOPRAMIDE HYDROCHLORIDE 5 MG/ML
10 INJECTION INTRAMUSCULAR; INTRAVENOUS ONCE
Status: DISCONTINUED | OUTPATIENT
Start: 2025-06-27 | End: 2025-06-27 | Stop reason: HOSPADM

## 2025-06-27 RX ORDER — GABAPENTIN 400 MG/1
800 CAPSULE ORAL 2 TIMES DAILY
Status: DISCONTINUED | OUTPATIENT
Start: 2025-06-27 | End: 2025-07-03

## 2025-06-27 RX ORDER — PHENYLEPHRINE HCL 10 MG/ML
VIAL (ML) INJECTION AS NEEDED
Status: DISCONTINUED | OUTPATIENT
Start: 2025-06-27 | End: 2025-06-27 | Stop reason: SURG

## 2025-06-27 RX ORDER — HEPARIN SODIUM 1000 [USP'U]/ML
INJECTION, SOLUTION INTRAVENOUS; SUBCUTANEOUS
Status: COMPLETED
Start: 2025-06-27 | End: 2025-06-27

## 2025-06-27 RX ORDER — SODIUM CHLORIDE 9 MG/ML
INJECTION, SOLUTION INTRAVENOUS CONTINUOUS
Status: DISCONTINUED | OUTPATIENT
Start: 2025-06-27 | End: 2025-06-27 | Stop reason: ALTCHOICE

## 2025-06-27 RX ORDER — DEXTROSE MONOHYDRATE 25 G/50ML
50 INJECTION, SOLUTION INTRAVENOUS
Status: DISCONTINUED | OUTPATIENT
Start: 2025-06-27 | End: 2025-06-27 | Stop reason: HOSPADM

## 2025-06-27 RX ORDER — MORPHINE SULFATE 10 MG/ML
6 INJECTION, SOLUTION INTRAMUSCULAR; INTRAVENOUS EVERY 10 MIN PRN
Status: DISCONTINUED | OUTPATIENT
Start: 2025-06-27 | End: 2025-06-27 | Stop reason: HOSPADM

## 2025-06-27 RX ORDER — ROCURONIUM BROMIDE 10 MG/ML
INJECTION, SOLUTION INTRAVENOUS AS NEEDED
Status: DISCONTINUED | OUTPATIENT
Start: 2025-06-27 | End: 2025-06-27 | Stop reason: SURG

## 2025-06-27 RX ORDER — SODIUM CHLORIDE 9 MG/ML
3 INJECTION, SOLUTION INTRAVENOUS CONTINUOUS
Status: DISCONTINUED | OUTPATIENT
Start: 2025-06-27 | End: 2025-06-27 | Stop reason: ALTCHOICE

## 2025-06-27 RX ADMIN — DOCUSATE SODIUM 100 MG: 100 CAPSULE, LIQUID FILLED ORAL at 21:54:00

## 2025-06-27 RX ADMIN — PHENYLEPHRINE HCL 100 MCG: 10 MG/ML VIAL (ML) INJECTION at 11:31:00

## 2025-06-27 RX ADMIN — HEPARIN SODIUM 7000 UNITS: 1000 INJECTION, SOLUTION INTRAVENOUS; SUBCUTANEOUS at 11:42:00

## 2025-06-27 RX ADMIN — ROCURONIUM BROMIDE 30 MG: 10 INJECTION, SOLUTION INTRAVENOUS at 13:14:00

## 2025-06-27 RX ADMIN — ONDANSETRON 4 MG: 2 INJECTION INTRAMUSCULAR; INTRAVENOUS at 10:45:00

## 2025-06-27 RX ADMIN — INSULIN DEGLUDEC 10 UNITS: 100 INJECTION, SOLUTION SUBCUTANEOUS at 21:55:00

## 2025-06-27 RX ADMIN — ROCURONIUM BROMIDE 50 MG: 10 INJECTION, SOLUTION INTRAVENOUS at 10:45:00

## 2025-06-27 RX ADMIN — PROTAMINE SULFATE 50 MG: 10 INJECTION, SOLUTION INTRAVENOUS at 12:18:00

## 2025-06-27 RX ADMIN — DEXTROSE, SODIUM CHLORIDE, SODIUM LACTATE, POTASSIUM CHLORIDE, AND CALCIUM CHLORIDE: 5; .6; .31; .03; .02 INJECTION, SOLUTION INTRAVENOUS at 16:40:00

## 2025-06-27 RX ADMIN — SODIUM CHLORIDE 3 ML/KG/HR: 9 INJECTION, SOLUTION INTRAVENOUS at 08:00:00

## 2025-06-27 RX ADMIN — HYDROCODONE BITARTRATE AND ACETAMINOPHEN 1 TABLET: 5; 325 TABLET ORAL at 17:17:00

## 2025-06-27 RX ADMIN — NICOTINE 1 PATCH: 21 MG/24HR PATCH, TRANSDERMAL 24 HOURS TRANSDERMAL at 18:07:00

## 2025-06-27 RX ADMIN — GABAPENTIN 800 MG: 400 CAPSULE ORAL at 20:22:00

## 2025-06-27 RX ADMIN — ALLOPURINOL 100 MG: 100 TABLET ORAL at 20:21:00

## 2025-06-27 RX ADMIN — HYDROMORPHONE HYDROCHLORIDE 0.4 MG: 1 INJECTION, SOLUTION INTRAMUSCULAR; INTRAVENOUS; SUBCUTANEOUS at 17:22:00

## 2025-06-27 RX ADMIN — HEPARIN SODIUM 5000 UNITS: 1000 INJECTION, SOLUTION INTRAVENOUS; SUBCUTANEOUS at 13:14:00

## 2025-06-27 RX ADMIN — AMLODIPINE BESYLATE 10 MG: 10 TABLET ORAL at 20:21:00

## 2025-06-27 RX ADMIN — IPRATROPIUM BROMIDE AND ALBUTEROL SULFATE 3 ML: 2.5; .5 SOLUTION RESPIRATORY (INHALATION) at 15:07:00

## 2025-06-27 RX ADMIN — IOPAMIDOL 100 ML: 612 INJECTION, SOLUTION INTRAVASCULAR at 12:29:00

## 2025-06-27 RX ADMIN — ENOXAPARIN SODIUM 40 MG: 100 INJECTION SUBCUTANEOUS at 20:22:00

## 2025-06-27 RX ADMIN — HYDROMORPHONE HYDROCHLORIDE 0.4 MG: 1 INJECTION, SOLUTION INTRAMUSCULAR; INTRAVENOUS; SUBCUTANEOUS at 21:54:00

## 2025-06-27 NOTE — OR NURSING
Marcaine 0.5% 10 ml injected into left groin.    Heparin 50,000 units in 500 ml NaCl 0.9% PRN irrigation    Gelfoam x1 soaked in Thrombin 20,000 units PRN    Ancef 1g in 1L NaCl 0.9% for irrigation PRN

## 2025-06-27 NOTE — CONSULTS
[facility]    PATIENT'S NAME: VIRGIL HOUSE   ATTENDING PHYSICIAN: Samer F. Najjar, MD   CONSULTING PHYSICIAN: Veronica Yung MD   PATIENT ACCOUNT#:   [id number]    LOCATION:  [facility] PACU 5 [facility] 10  MEDICAL RECORD #:   [id number]       YOB: 1955  ADMISSION DATE:       2025      CONSULT DATE:  2025    REPORT OF CONSULTATION      REASON FOR ADMISSION:  Post abdominal aortic aneurysm endovascular repair.    HISTORY OF PRESENT ILLNESS:  Patient is a 70-year-old  female with enlarging infrarenal abdominal aortic aneurysm to the point of 5.1 cm width and 7.4 cm length, referred to Vascular Surgery, Dr. Najjar, for endovascular repair, scheduled today for above-mentioned procedure.  Postoperatively, transferred to PACU for further monitoring.     PAST MEDICAL HISTORY:  Abdominal aortic aneurysm, peripheral arterial disease with left external iliac chronic occlusion and reconstitution bilateral lower extremity moderate disease, bilateral carotid atherosclerosis 50%, depression, peripheral neuropathy, hypertension, hyperlipidemia, gout, chronic kidney disease stage 3, gastroesophageal reflux disease, diabetes mellitus type 2, chronic obstructive pulmonary disease, degenerative joint disease of lumbar spine, and generalized osteoarthritis.     PAST SURGICAL HISTORY:  , cholecystectomy, and lumbar laminectomy.     MEDICATIONS:  Please see medication reconciliation list.      ALLERGIES:  Side effects to atorvastatin.  No known drug allergies.    SOCIAL HISTORY:  Chronic tobacco use.  Social alcohol.  No drug use.  Independent in her basic activities of daily living.    FAMILY HISTORY:  Mother had diabetes mellitus type 2.  Father had cancer.    REVIEW OF SYSTEMS:  Currently resting in bed, complaining of some lower abdominal discomfort, mild wheezing, and shortness of breath.  Other 12-point review of systems is negative.        PHYSICAL EXAMINATION:    GENERAL:   Alert, oriented to time, place, and person.  Mild distress.  A bit restless and agitated.   VITAL SIGNS:  Temperature 97.4, pulse 75, respiratory rate 12, blood pressure 140/53 via left radial arterial line, pulse ox 96% on room air.    HEENT:  Atraumatic.  Oropharynx clear.  Dry mucous membranes.  Normal hard and soft palate.  Eyes:  Anicteric sclerae.    NECK:  Supple.  No lymphadenopathy.  Trachea midline.  Full range of motion.    LUNGS:  Faint end-expiratory wheezing.   HEART:  Regular rate and rhythm.  S1, S2 auscultated.  No murmur.    ABDOMEN:  Soft, nondistended.  No tenderness.   EXTREMITIES:  No peripheral edema, clubbing, or cyanosis.  Bilateral groin puncture wound dressings.  Dorsalis pedis pulses palpated bilaterally.   NEUROLOGIC:  Motor and sensory intact.     ASSESSMENT AND PLAN:    1.   Abdominal aortic aneurysm, status post endovascular graft repair.  Pain control.  Monitor hemodynamic status via left radial arterial line.  DVT prophylaxis.  2.   Peripheral arterial disease.  3.   Chronic obstructive pulmonary disease.  Continue home medication and monitor.   4.   Gout.  Continue home medication.  5.   Hyperlipidemia.  Continue home medication.  6.   Essential hypertension.  Continue home medication and monitor.  7.   Chronic kidney disease stage 3.  Continue to monitor postoperatively.  8.   Diabetes mellitus type 2.  Continue home medications.  Monitor Accu-Cheks.  Sliding scale insulin.    Dictated By Veronica Yung MD  d: 06/27/2025 15:10:20  t: 06/27/2025 15:30:58  Job [id number]  FB/

## 2025-06-27 NOTE — ANESTHESIA PROCEDURE NOTES
Arterial Line    Date/Time: 6/27/2025 10:45 AM    Performed by: Edward Stack MD  Authorized by: Edward Stack MD    General Information and Staff    Procedure Start:  6/27/2025 10:45 AM  Procedure End:  6/27/2025 10:49 AM  Anesthesiologist:  Edward Stack MD  Performed By:  Anesthesiologist  Patient Location:  OR  Indication: continuous blood pressure monitoring and blood sampling needed    Site Identification: surface landmarks    Preanesthetic Checklist: 2 patient identifiers, IV checked, risks and benefits discussed, monitors and equipment checked, pre-op evaluation, timeout performed, anesthesia consent and sterile technique used    Procedure Details    Catheter Size:  20 G  Catheter Length:  1 and 3/4 inch  Catheter Type:  Arrow  Seldinger Technique?: Yes    Laterality:  Left  Site:  Radial artery  Site Prep: chlorhexidine    Line Secured:  Wrist Brace, tape and Tegaderm    Assessment    Events: patient tolerated procedure well with no complications      Medications  6/27/2025 10:45 AM      Additional Comments

## 2025-06-27 NOTE — ANESTHESIA PREPROCEDURE EVALUATION
Anesthesia PreOp Note    HPI:     Mandi Moran is a 70 year old female who presents for preoperative consultation requested by: * No surgeons listed *    Date of Surgery: 6/27/2025    * No procedures listed *  Indication: * No pre-op diagnosis entered *    Relevant Problems   No relevant active problems       NPO:  Last Liquid Consumption Date: 06/27/25  Last Liquid Consumption Time: 0000  Last Solid Consumption Date: 06/26/25  Last Solid Consumption Time: 2230  Last Liquid Consumption Date: 06/27/25          History Review:  Patient Active Problem List    Diagnosis Date Noted    Statin intolerance 04/30/2025    Bilateral hearing loss 04/30/2025    Spinal stenosis of lumbar region with neurogenic claudication 01/07/2025    Abdominal aortic aneurysm (AAA) without rupture 01/07/2025    Abdominal discomfort 11/20/2024    Type 2 diabetes mellitus with stage 3a chronic kidney disease, without long-term current use of insulin (HCC) 11/20/2024    Hyperlipidemia 11/20/2024    Primary hypertension 11/20/2024    Stage 3a chronic kidney disease (HCC) 11/20/2024    Chronic obstructive pulmonary disease (HCC) 11/20/2024    Chronic gout without tophus 11/20/2024    Diabetic polyneuropathy associated with type 2 diabetes mellitus (HCC) 11/20/2024    Cigarette smoker 11/20/2024    Health maintenance examination 11/20/2024       Past Medical History[1]    Past Surgical History[2]    Prescriptions Prior to Admission[3]  Current Medications and Prescriptions Ordered in Epic[4]    Allergies[5]    Family History[6]  Social Hx on file[7]    Available pre-op labs reviewed.  Lab Results   Component Value Date    WBC 10.3 06/20/2025    RBC 4.44 06/20/2025    HGB 13.2 06/20/2025    HCT 40.1 06/20/2025    MCV 90.3 06/20/2025    MCH 29.7 06/20/2025    MCHC 32.9 06/20/2025    RDW 15.6 (H) 06/20/2025    .0 06/20/2025     Lab Results   Component Value Date     06/20/2025    K 4.6 06/20/2025     06/20/2025    CO2 26.0  06/20/2025    BUN 32 (H) 06/20/2025    CREATSERUM 1.42 (H) 06/20/2025    GLU 58 (L) 06/20/2025    PGLU 118 (H) 06/27/2025    CA 10.2 06/20/2025          Vital Signs:  Body mass index is 31.22 kg/m².   height is 1.651 m (5' 5\") and weight is 85.1 kg (187 lb 9.6 oz). Her temporal temperature is 97.4 °F (36.3 °C). Her blood pressure is 140/53 and her pulse is 75. Her respiration is 12 and oxygen saturation is 96%.   Vitals:    06/19/25 1401 06/27/25 0800   BP:  140/53   Pulse:  75   Resp:  12   Temp:  97.4 °F (36.3 °C)   TempSrc:  Temporal   SpO2:  96%   Weight: 82.1 kg (181 lb) 85.1 kg (187 lb 9.6 oz)   Height: 1.651 m (5' 5\") 1.651 m (5' 5\")        Anesthesia Evaluation     Patient summary reviewed and Nursing notes reviewed    Airway   Mallampati: I  TM distance: >3 FB  Neck ROM: full  Dental - Dentition appears grossly intact     Pulmonary - normal exam    breath sounds clear to auscultation  (+) COPD  Cardiovascular   Exercise tolerance: poor  (+) hypertension    Rhythm: regular  Rate: normal    Neuro/Psych    (+)  neuromuscular disease,  depression      GI/Hepatic/Renal    (+) GERD    Endo/Other    (+) diabetes mellitus  Abdominal                  Anesthesia Plan:   ASA:  3  Plan:   General  Monitors and Lines:   A-line  Airway:  ETT      I have informed Mandi JENNIFER Mcleanaraceli and/or legal guardian or family member of the nature of the anesthetic plan, benefits, risks including possible dental damage if relevant, major complications, and any alternative forms of anesthetic management.   All of the patient's questions were answered to the best of my ability. The patient desires the anesthetic management as planned.  Edward Stack MD  6/27/2025 9:55 AM  Present on Admission:  **None**           [1]   Past Medical History:   Arthritis    Back problem    COPD (chronic obstructive pulmonary disease) (HCC)    Depression    Diabetes (HCC)    Esophageal reflux    Essential hypertension    Gout    High blood pressure    High  cholesterol    Hyperlipidemia    Incontinence    Neuropathy    Osteoarthritis    Peripheral vascular disease    Visual impairment   [2]   Past Surgical History:  Procedure Laterality Date          Cholecystectomy      Colonoscopy      Laminectomy  2024    Laminotomy with decompression of nerve roots, including partial facetectomy, foraminotomy, and excision of herniated intervertebral disc    Upper gi endoscopy,exam     [3]   Medications Prior to Admission   Medication Sig Dispense Refill Last Dose/Taking    MAGNESIUM GLYCINATE OR Take by mouth at bedtime.   2025    gabapentin 400 MG Oral Cap Gabapentin 800 mg in the morning, 400 mg in the afternoon, 800 mg at night (Patient taking differently: Gabapentin 800 mg in the morning, 400 mg as needed in the afternoon and 400 mg-800 mg at bedtime) 360 capsule 3 2025 Morning    fenofibrate 145 MG Oral Tab Take 1 tablet (145 mg total) by mouth daily. 90 tablet 1 2025    fluticasone-salmeterol 250-50 MCG/ACT Inhalation Aerosol Powder, Breath Activated Inhale 1 puff into the lungs 2 (two) times daily. 60 each 3 2025 Morning    insulin glargine (LANTUS SOLOSTAR) 100 UNIT/ML Subcutaneous Solution Pen-injector Inject 10 Units into the skin nightly. 15 mL 3 2025    allopurinol 100 MG Oral Tab Allopurinol 200 mg in the morning and 100 mg at night 270 tablet 3 2025 Morning    aspirin (ASPIRIN 81) 81 MG Oral Tab EC Take 1 tablet (81 mg total) by mouth daily. 90 tablet 0 2025 Morning    metFORMIN HCl 1000 MG Oral Tab Take 1 tablet (1,000 mg total) by mouth 2 (two) times daily with meals. 180 tablet 1 2025    pantoprazole 40 MG Oral Tab EC    2025 Morning    cholecalciferol 125 MCG (5000 UT) Oral Cap Take 1 capsule (5,000 Units total) by mouth daily.   2025    Ascorbic Acid (VITAMIN C) 1000 MG Oral Tab Take 0.5 tablets (500 mg total) by mouth daily.   2025    B Complex Vitamins (PA B-COMPLEX WITH B-12 OR)    2025     albuterol 108 (90 Base) MCG/ACT Inhalation Aero Soln every 28 days. FOR 21 DAYS IN, THEN REMOVE FOR 7 DAYS   6/27/2025 Morning    acetaminophen (TYLENOL EXTRA STRENGTH) 500 MG Oral Tab Take 4 tablets (2,000 mg total) by mouth in the morning and 4 tablets (2,000 mg total) before bedtime.   6/27/2025 Morning    amLODIPine 10 MG Oral Tab Take 1 tablet (10 mg total) by mouth at bedtime.       BD PEN NEEDLE MINI U/F 31G X 5 MM Does not apply Misc 1 kit 2 (two) times daily.       Lancets (ONETOUCH DELICA PLUS [id number]) Does not apply Misc Apply 1 Application topically 2 (two) times daily.      [4]   Current Facility-Administered Medications Ordered in Epic   Medication Dose Route Frequency Provider Last Rate Last Admin    lactated ringers infusion   Intravenous Continuous Najjar, Samer F, MD        acetaminophen (Tylenol Extra Strength) tab 1,000 mg  1,000 mg Oral Once Najjar, Samer F, MD        famotidine (Pepcid) tab 20 mg  20 mg Oral Once Najjar, Samer F, MD        Or    famotidine (Pepcid) 20 mg/2mL injection 20 mg  20 mg Intravenous Once Najjar, Samer F, MD        metoclopramide (Reglan) tab 10 mg  10 mg Oral Once Najjar, Samer F, MD        Or    metoclopramide (Reglan) 5 mg/mL injection 10 mg  10 mg Intravenous Once Najjar, Samer F, MD        sodium chloride 0.9% infusion   Intravenous Continuous Najjar, Samer F, MD        sodium chloride 0.9% infusion  3 mL/kg/hr Intravenous Continuous Najjar, Samer F, .3 mL/hr at 06/27/25 0800 3 mL/kg/hr at 06/27/25 0800     No current [facility]-ordered outpatient medications on file.   [5]   Allergies  Allergen Reactions    Atorvastatin OTHER (SEE COMMENTS)     Muscle aches    Nickel RASH   [6]   Family History  Problem Relation Age of Onset    Skin cancer Mother     Depression Mother     Diabetes Mother     Cancer Father     Suicide History Father     Skin cancer Sister     Depression Brother     Cataracts Brother     No Known Problems Maternal Grandmother     Kidney  Disease Maternal Grandfather     No Known Problems Paternal Grandmother     No Known Problems Paternal Grandfather     Breast Cancer Neg     Colon Cancer Neg    [7]   Social History  Socioeconomic History    Marital status:    Tobacco Use    Smoking status: Every Day     Current packs/day: 1.50     Average packs/day: 1 pack/day for 59.5 years (62.2 ttl pk-yrs)     Types: Cigarettes     Start date: 1966    Smokeless tobacco: Never    Tobacco comments:     Patient smokes x 66 years   Vaping Use    Vaping status: Some Days    Substances: Nicotine   Substance and Sexual Activity    Alcohol use: Yes     Comment: socially    Drug use: Never    Sexual activity: Not Currently     Partners: Male

## 2025-06-27 NOTE — ANESTHESIA PROCEDURE NOTES
Airway  Date/Time: 6/27/2025 10:45 AM  Reason: Elective      General Information and Staff   Patient location during procedure: OR  Anesthesiologist: Edward Stack MD  Performed: anesthesiologist   Performed by: Edward Stack MD  Authorized by: Edward Stack MD        Indications and Patient Condition  Indications for airway management: anesthesia  Sedation level: deep      Preoxygenated: yesPatient position: sniffing    Mask difficulty assessment: 1 - vent by mask    Final Airway Details    Final airway type: endotracheal airway    Successful airway: ETT  Cuffed: yes   Successful intubation technique: direct laryngoscopy  Endotracheal tube insertion site: oral  Blade: Airam  Blade size: #3  ETT size (mm): 7.0    Cormack-Lehane Classification: grade IIA - partial view of glottis  Placement verified by: capnometry   Measured from: teeth  Number of attempts at approach: 1

## 2025-06-27 NOTE — H&P
Samer F. Najjar, MD  Vascular Surgery  [facility]                     VASCULAR SURGERY   HP NOTE           Name: Mandi Moran   :   1955  [id number]      REFERRING PHYSICIAN:  Johnny Westfall  PRIMARY CARE PHYSICIAN:  Johnny Westfall MD     HISTORY OF PRESENT ILLNESS:   Patient is a 70 year old female who is here for treatment of her abdominal aortic aneurysm treatment.  The patient had a CT angiogram recently that revealed that the size of the aneurysm that has reached 5.1 cm.  He is here to discuss different treatment options.  She denies any chest pain.  She is able to go up multiple flights of the stairs.  She walks her dog on a daily basis.  She continues to smoke and is not ready to stop.  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  She does not have any arm claudication or steal symptoms.     PAST MEDICAL HISTORY:    [Past Medical History]    [Past Medical History]   Arthritis    COPD (chronic obstructive pulmonary disease) (HCC)    Diabetes (HCC)    Essential hypertension    Gout    Hyperlipidemia        PAST SURGICAL HISTORY:   [Past Surgical History]     [Past Surgical History]        Procedure Laterality Date            Cholecystectomy        Laminectomy   2024     Laminotomy with decompression of nerve roots, including partial facetectomy, foraminotomy, and excision of herniated intervertebral disc        MEDICATIONS:   [Medications - Current]    [Medications - Current]     Current Outpatient Medications:     gabapentin 400 MG Oral Cap, Gabapentin 800 mg in the morning, 400 mg in the afternoon, 800 mg at night, Disp: 360 capsule, Rfl: 3    fenofibrate 145 MG Oral Tab, Take 1 tablet (145 mg total) by mouth daily., Disp: 90 tablet, Rfl: 1    fluticasone-salmeterol 250-50 MCG/ACT Inhalation  Aerosol Powder, Breath Activated, Inhale 1 puff into the lungs 2 (two) times daily., Disp: 60 each, Rfl: 3    insulin glargine (LANTUS SOLOSTAR) 100 UNIT/ML Subcutaneous Solution Pen-injector, Inject 10 Units into the skin nightly., Disp: 15 mL, Rfl: 3    allopurinol 100 MG Oral Tab, Allopurinol 200 mg in the morning and 100 mg at night, Disp: 270 tablet, Rfl: 3    aspirin (ASPIRIN 81) 81 MG Oral Tab EC, Take 1 tablet (81 mg total) by mouth daily., Disp: 90 tablet, Rfl: 0    insulin degludec 200 unit/mL Subcutaneous Solution Pen-injector, Inject 10 Units into the skin nightly., Disp: 12 mL, Rfl: 3    metFORMIN HCl 1000 MG Oral Tab, Take 1 tablet (1,000 mg total) by mouth 2 (two) times daily with meals., Disp: 180 tablet, Rfl: 1    amLODIPine 10 MG Oral Tab, Take 1 tablet (10 mg total) by mouth daily., Disp: , Rfl:     BD PEN NEEDLE MINI U/F 31G X 5 MM Does not apply Misc, 1 kit 2 (two) times daily., Disp: , Rfl:     Lancets (ONETOUCH DELICA PLUS [id number]) Does not apply Misc, Apply 1 Application topically 2 (two) times daily., Disp: , Rfl:     pantoprazole 40 MG Oral Tab EC, TAKE 1 TABLET BY MOUTH EVERY MORNING BEFORE BREAKFAST FOR 90 DAYS, Disp: , Rfl:     Cholecalciferol (VITAMIN D3) 250 MCG (56054 UT) Oral Cap, 250 mcg., Disp: , Rfl:     Ascorbic Acid (VITAMIN C) 500 MG Oral Cap, 500 mg., Disp: , Rfl:     B Complex Vitamins (PA B-COMPLEX WITH B-12 OR), PO, Daily, 0 Refill(s), Disp: , Rfl:     albuterol 108 (90 Base) MCG/ACT Inhalation Aero Soln, every 28 days. FOR 21 DAYS IN, THEN REMOVE FOR 7 DAYS, Disp: , Rfl:     acetaminophen (TYLENOL EXTRA STRENGTH) 500 MG Oral Tab, 2 tablets (1,000 mg total)., Disp: , Rfl:     APPLE CIDER VINEGAR OR, Take 450 mg by mouth., Disp: , Rfl:      ALLERGIES:    She is allergic to atorvastatin and nickel.     SOCIAL HISTORY:    Patient  reports that she has been smoking cigarettes. She started smoking about 59 years ago. She has a 62.1 pack-year smoking history. She uses smokeless  tobacco. She reports current alcohol use. She reports that she does not use drugs.     FAMILY HISTORY:    Patient's family history includes Cancer in her father; Cataracts in her brother; Depression in her brother and mother; Diabetes in her mother; Kidney Disease in her maternal grandfather; No Known Problems in her maternal grandmother, paternal grandfather, and paternal grandmother; Skin cancer in her mother and sister; Suicide History in her father.     ROS:     A 12 point review of systems with pertinent positives and negatives listed in the HPI.     EXAM:     /78   Ht 5' 5\" (1.651 m)   Wt 181 lb (82.1 kg)   BMI 30.12 kg/m²      RESPIRATORY: no rales, rhonchi, or wheezes B  CARDIO: RRR without murmur, no murmur, no gallop   ABDOMEN: soft, non-tender   VASCULAR: Pulses not examined today              IMAGING:   The CT angiogram was reviewed with the patient     ASSESSMENT  Diagnoses and all orders for this visit:     Infrarenal abdominal aortic aneurysm (AAA) without rupture           I explained to the patient that based on her CT angiogram of the abdomen and pelvis the aneurysm has crossed the size of 5 cm,  I would recommend consideration for repair.  The aneurysm is amenable to endovascular repair.  The risks of endovascular repair included: access site complications (both open and percutaneous), endoleaks (all types), device migration, separation of components, limb kinking and occlusion, endograft infection, in addition to cardiopulmonary complications, intravenous contrast complications (both contrast-induced nephropathy and allergic reactions) and ischemic complications (renal, intestinal, extremity, pelvic and spinal) including conversion to an open procedure and death.  We also discussed the signs and symptoms of a ruptured aneurysm with the importance of proceeding to the emergency room in that event.  All questions were answered.  The patient was to proceed with repair.   We will address her  innominate artery stenosis later after repair of her aneurysm.        PLAN:  Endovascular abdominal aortic aneurysm repair using the gore device           Sincerely,  Samer F. Najjar MD     Please note: Dragon speech recognition software was used to prepare this note. If a word or phrase is confusing, it is likely do to a failure of recognition.   Please contact me with any questions or clarifications.

## 2025-06-27 NOTE — OPERATIVE REPORT
[facility]     PATIENTS NAME: Mandi Moran  ATTENDING PHYSICIAN: Najjar, Samer F, MD  OPERATING PHYSICIAN: Samer F Najjar, MD  CSN: [id number]     LOCATION:  OR  MRN: [id number]    YOB: 1955  ADMISSION DATE: 6/27/2025  OPERATION DATE: 6/27/2025                OPERATIVE PROCEDURE REPORT       PRE-OPERATIVE DIAGNOSIS:  Large abdominal aortic aneurysm    POST-OPERATIVE DIAGNOSIS: Same as above    PROCEDURE PERFORMED:   Endovascular abdominal aortic aneurysm repair with the [place] Excluder endograft  Percutaneous access on the right  Femoral artery cutdown on the left with thrombectomy and femoral endarterectomy      ANESTHESIA: General    SURGEON: Samer F Najjar, MD    CONTRAST AMOUNT: 100 ml    ASSISTANT: Interventional radiology staff    EBL: 150 ml    COMPLICATIONS: Left femoral artery occlusion after closure device, this was recognized before the patient was extubated.     SUMMARY OF CASE: Difficult groin access owing to her large body habitus. Initial access was bilateral percutaneous. Upon completion, the patient was noted to have an absent palpable left dorsalis pedis pulse which she had preoperatively.  Therefore it was felt that she had developed an occlusion of the left common femoral artery and a cutdown was performed.  The Perclose did not snag the back wall but it was just a thrombosed artery owing to the heavily diseased artery likely from the presence of a large and holding pressure subsequently.  A superficial femoral artery thrombectomy was performed with the Jerald catheter with retrieval of thrombus and a common femoral, profunda and proximal superficial femoral artery endarterectomy was performed with bovine patch angioplasty.  The patient was noted to have a palpable dorsalis pedis pulse upon completion.  There was a very faint type II endoleak in the sacral area.     INDICATIONS: The patient is a 70 year old female with history of a large abdominal aortic aneurysm. The size  of the aneurysm was 5.4 cm. The infrarenal AAA was felt to be suitable for endovascular repair which was recommended and accepted.  The risks and benefits of this repair were discussed with him and his family in detail.  Those include risk of bleeding, iliac artery dissection or rupture, distal embolization, kinking, femoral artery complications, renal dysfunction, endoleak, late aneurysm rupture, migration, limb thrombosis, structural graft failure, graft infection, among other complications. Patient understood, signed informed consent, and expressed wish to proceed.    DESCRIPTION OF PROCEDURE:  The patient was brought to the catheterization lab and laid supine on the table.  After induction of general endotracheal anesthesia, both groin areas were prepped and draped in the usual surgical sterile fashion.  Access was established using a percutaneous technique through accessing the common femoral artery and dilating each artery with an 8-Fr sheath.  Perclose closure devices were deployed at 90° angles, but the sutures were left loose .  Then, 8-Fr sheaths were placed on both sides, and the patient was anticoagulated with 80 units/kg of heparin.  The sheaths were then exchanged for a 16-Tamazight sheath on the left and 12-Fr sheath on the right over 180-cm Lubin wires.  A 5-Fr pigtail catheter was then advanced via the right side up to the pararenal aorta.  Then, based on our preoperative CT imaging and measurements of the diameter of neck, a 26 mm x 14.5 mm x 12 cm device will allow the best option for seal. The main body was then advanced from the left side. Then, an aortogram and pelvic angiogram with a power injection of intravenous contrast was performed which allowed us to measure the length of the aorta from the lowest renal artery to the ipsilateral common iliac artery bifurcation. The lowest renal artery was marked.  The endograft was oriented under fluoroscopy to position the proximal marker of the endograft  just below the lowest renal artery. The contralateral gate was oriented slightly anterior in order to facilitate subsequent cannulation. The sheath was pulled back and the proximal trunk of the endograft was deployed while exerting a continuous pull to open the contralateral gate while leaving the ipsilateral limb constrained.  The position of the endograft was confirmed with repeat fluoroscopy. Once we were satisfied with the position, the final placement was achieved by disengaging the constraining mechanism of the neck. Then, an 0.035 Glidewire Advantage wire was advanced from the contralateral side over a Butler catheter to cannulate the gate. Once this was achieved, the marker pigtail catheter was reintroduced over the wire and intraluminal access confirmation was performed by rotating ht pigtail catheter. The Glidewire Advantage wire was exchanged to an Amplatz wire and a retrograde right iliac angiogram was obtained to flori the common iliac artery bifurcation and confirm the length of the desired limb. A 16 mm x 12 mm x 12 cm contralateral limb device was prepared and advanced and deployed at the graft bifurcation and the distal end landed just above the internal iliac artery takeoff. This was followed by final deployment of the ipsilateral leg and removing the device. The ipsilateral iliac limb landed landed short along the ipsilateral common iliac artery, and so an ipsilateral limb endoprosthesis was deemed necessary to extend to the iliac bifurcation.  Then, the marker pigtail catheter was reintroduced over the wire. A retrograde left iliac angiogram was obtained to flori the common iliac artery bifurcation and confirm the length of the desired limb. A 16 mm x 10 mm x 7 cm ipsilateral limb device was prepared and advanced and deployed at the graft bifurcation and the distal end landed just above the internal iliac artery takeoff. Then, the MOB balloon was used to angioplasty the neck, the sealing zones  of the iliac limbs and the zone of overlap between the main body and the contralateral iliac limb. A completion angiogram was obtained while withdrawing blood from the iliac sheaths. This confirmed the absence of correctable endoleaks. The heparin was reversed with protamine. The sheaths and catheters were removed and the arteriotomies were closed with tightening of the Perclose sutures. Pressure was held until hemostasis was achieved.  Dermabond was used to seal the arteriotomy incision sites followed by 4x4 gauze and Tegaderm over the incision sites.  We had to hold extra 10 min pressure on the left groin. Upon completion, the right foot was warm with a weak posterior tibial signal which is similar to preoperative exam while there was an absent palpable left dorsalis pedis pulse or signal which she had preoperatively.  Therefore, it was felt that she had developed an occlusion of the left common femoral artery.  The patient was never extubated and therefore the left common femoral artery area was then prepped and draped in the usual surgical sterile fashion.  A vertical incision was performed through the subcutaneous tissues and the common femoral, profunda and superficial femoral arteries were then carefully dissected and encircled with Vesseloops.  The patient was then given 5000 units of heparin and and all of these 3 arteries were then clamped.  A vertical arteriotomy was performed along the anterior wall of the common femoral artery.  The perclose suture was limited to the anterior wall and did not snag the back wall which was heavily diseased.  There was thrombus present within the heavily diseased artery.  This appears to have been likely due to the presence of an occlusive large sheath and holding pressure subsequently.  Then, a superficial femoral,  artery thrombectomy was performed with a #4 Jerald catheter with retrieval of thrombus.  Then, a common femoral, profunda and proximal superficial femoral  artery endarterectomy was performed.  The profunda orifice also was endarterectomized.  The distal intima and the superficial femoral artery was then secured with interrupted 7-0 Prolene sutures and the arteriotomy was closed using a bovine patch using 6-0 Prolene in running fashion.  Prior to completion of the was then secured with bovine patch angioplasty.  After appropriate flushing maneuvers, flow was then restored to the profunda first and then into the superficial femoral artery.  There was some bleeding from the medial wall of the profunda which was a bit thinned out and this was repaired with a pledgeted suture.  Excellent dopplerable signals were noted in the superficial femoral artery and the profunda.  And the patient's heparin was reversed using 50 mg of protamine.  Once hemostasis was assured the wound was irrigated copiously and thrombin Gelfoam was applied followed by Floseal until excellent hemostasis was achieved and then the wound was closed in 2 layers of 2-0 Vicryl followed by 3-0 Vicryl and a 5-0 Monocryl for the skin followed by Dermabond. Both feet were inspected and were warm and well perfused.  There was a stronger dorsalis pedis pulse on the left now.  There were no complications.

## 2025-06-27 NOTE — ANESTHESIA POSTPROCEDURE EVALUATION
Patient: Mandi Moran    Procedure Summary       Date: 06/27/25 Room / Location: [facility] Interventional Suites    Anesthesia Start: 1040 Anesthesia Stop: 1437    Procedure: CATH ABDOMINAL AORTIC ANEURYSM Diagnosis:       Infrarenal abdominal aortic aneurysm (AAA) without rupture      Infrarenal abdominal aortic aneurysm (AAA) without rupture    Scheduled Providers: Najjar, Samer F, MD; Edward Stack MD Anesthesiologist: Edward Stack MD    Anesthesia Type: general ASA Status: 3            Anesthesia Type: general    Vitals Value Taken Time   BP 91/38 06/27/25 14:41   Temp 98.4 06/27/25 14:43   Pulse 62 06/27/25 14:42   Resp 24 06/27/25 14:42   SpO2 99 % 06/27/25 14:42   Vitals shown include unfiled device data.    [facility] AN Post Evaluation:   Patient Evaluated in PACU  Patient Participation: complete - patient participated  Level of Consciousness: awake and awake and alert  Pain Score: 2  Pain Management: adequate  Airway Patency:patent  Dental exam unchanged from preop  Yes    Nausea/Vomiting: none  Cardiovascular Status: acceptable, blood pressure returned to baseline and hemodynamically stable  Respiratory Status: acceptable, unassisted and spontaneous ventilation  Postoperative Hydration stable      Edward Stack MD  6/27/2025 2:43 PM

## 2025-06-28 ENCOUNTER — ANESTHESIA (OUTPATIENT)
Dept: SURGERY | Facility: HOSPITAL | Age: 70
End: 2025-06-28
Payer: MEDICARE

## 2025-06-28 ENCOUNTER — ANESTHESIA EVENT (OUTPATIENT)
Dept: SURGERY | Facility: HOSPITAL | Age: 70
End: 2025-06-28
Payer: MEDICARE

## 2025-06-28 ENCOUNTER — APPOINTMENT (OUTPATIENT)
Dept: GENERAL RADIOLOGY | Facility: HOSPITAL | Age: 70
DRG: 269 | End: 2025-06-28
Attending: EMERGENCY MEDICINE
Payer: MEDICARE

## 2025-06-28 DIAGNOSIS — T79.A0XA COMPARTMENT SYNDROME: Primary | ICD-10-CM

## 2025-06-28 LAB
ANION GAP SERPL CALC-SCNC: 3 MMOL/L (ref 0–18)
BLOOD TYPE BARCODE: 7300
BUN BLD-MCNC: 20 MG/DL (ref 9–23)
BUN/CREAT SERPL: 14.9 (ref 10–20)
CALCIUM BLD-MCNC: 8.5 MG/DL (ref 8.7–10.4)
CHLORIDE SERPL-SCNC: 114 MMOL/L (ref 98–112)
CO2 SERPL-SCNC: 25 MMOL/L (ref 21–32)
CREAT BLD-MCNC: 1.34 MG/DL (ref 0.55–1.02)
DEPRECATED RDW RBC AUTO: 50.4 FL (ref 35.1–46.3)
EGFRCR SERPLBLD CKD-EPI 2021: 42 ML/MIN/1.73M2 (ref 60–?)
ERYTHROCYTE [DISTWIDTH] IN BLOOD BY AUTOMATED COUNT: 15.8 % (ref 11–15)
GLUCOSE BLD-MCNC: 95 MG/DL (ref 70–99)
GLUCOSE BLDC GLUCOMTR-MCNC: 107 MG/DL (ref 70–99)
GLUCOSE BLDC GLUCOMTR-MCNC: 117 MG/DL (ref 70–99)
GLUCOSE BLDC GLUCOMTR-MCNC: 141 MG/DL (ref 70–99)
GLUCOSE BLDC GLUCOMTR-MCNC: 172 MG/DL (ref 70–99)
GLUCOSE BLDC GLUCOMTR-MCNC: 187 MG/DL (ref 70–99)
HCT VFR BLD AUTO: 29 % (ref 35–48)
HGB BLD-MCNC: 9.6 G/DL (ref 12–16)
MCH RBC QN AUTO: 28.9 PG (ref 26–34)
MCHC RBC AUTO-ENTMCNC: 33.1 G/DL (ref 31–37)
MCV RBC AUTO: 87.3 FL (ref 80–100)
OSMOLALITY SERPL CALC.SUM OF ELEC: 296 MOSM/KG (ref 275–295)
PLATELET # BLD AUTO: 186 10(3)UL (ref 150–450)
POTASSIUM SERPL-SCNC: 4.4 MMOL/L (ref 3.5–5.1)
RBC # BLD AUTO: 3.32 X10(6)UL (ref 3.8–5.3)
SODIUM SERPL-SCNC: 142 MMOL/L (ref 136–145)
UNIT VOLUME: 350 ML
WBC # BLD AUTO: 10.6 X10(3) UL (ref 4–11)

## 2025-06-28 PROCEDURE — 0KNT0ZZ RELEASE LEFT LOWER LEG MUSCLE, OPEN APPROACH: ICD-10-PCS | Performed by: SURGERY

## 2025-06-28 PROCEDURE — 71045 X-RAY EXAM CHEST 1 VIEW: CPT | Performed by: EMERGENCY MEDICINE

## 2025-06-28 PROCEDURE — 04CM0ZZ EXTIRPATION OF MATTER FROM RIGHT POPLITEAL ARTERY, OPEN APPROACH: ICD-10-PCS | Performed by: SURGERY

## 2025-06-28 PROCEDURE — 0K9T0ZZ DRAINAGE OF LEFT LOWER LEG MUSCLE, OPEN APPROACH: ICD-10-PCS | Performed by: SURGERY

## 2025-06-28 RX ORDER — DEXTROSE MONOHYDRATE 25 G/50ML
50 INJECTION, SOLUTION INTRAVENOUS
Status: DISCONTINUED | OUTPATIENT
Start: 2025-06-28 | End: 2025-06-28 | Stop reason: HOSPADM

## 2025-06-28 RX ORDER — EPHEDRINE SULFATE 50 MG/ML
INJECTION, SOLUTION INTRAVENOUS AS NEEDED
Status: DISCONTINUED | OUTPATIENT
Start: 2025-06-28 | End: 2025-06-28 | Stop reason: SURG

## 2025-06-28 RX ORDER — ETOMIDATE 2 MG/ML
INJECTION INTRAVENOUS AS NEEDED
Status: DISCONTINUED | OUTPATIENT
Start: 2025-06-28 | End: 2025-06-28 | Stop reason: SURG

## 2025-06-28 RX ORDER — HYDROMORPHONE HYDROCHLORIDE 1 MG/ML
0.2 INJECTION, SOLUTION INTRAMUSCULAR; INTRAVENOUS; SUBCUTANEOUS EVERY 5 MIN PRN
Status: DISCONTINUED | OUTPATIENT
Start: 2025-06-28 | End: 2025-06-28 | Stop reason: HOSPADM

## 2025-06-28 RX ORDER — SODIUM CHLORIDE, SODIUM LACTATE, POTASSIUM CHLORIDE, CALCIUM CHLORIDE 600; 310; 30; 20 MG/100ML; MG/100ML; MG/100ML; MG/100ML
INJECTION, SOLUTION INTRAVENOUS CONTINUOUS
Status: DISCONTINUED | OUTPATIENT
Start: 2025-06-28 | End: 2025-06-28 | Stop reason: HOSPADM

## 2025-06-28 RX ORDER — NALOXONE HYDROCHLORIDE 0.4 MG/ML
80 INJECTION, SOLUTION INTRAMUSCULAR; INTRAVENOUS; SUBCUTANEOUS AS NEEDED
Status: DISCONTINUED | OUTPATIENT
Start: 2025-06-28 | End: 2025-06-28 | Stop reason: HOSPADM

## 2025-06-28 RX ORDER — MORPHINE SULFATE 10 MG/ML
6 INJECTION, SOLUTION INTRAMUSCULAR; INTRAVENOUS EVERY 10 MIN PRN
Status: DISCONTINUED | OUTPATIENT
Start: 2025-06-28 | End: 2025-06-28 | Stop reason: HOSPADM

## 2025-06-28 RX ORDER — BUPIVACAINE HYDROCHLORIDE 5 MG/ML
INJECTION, SOLUTION EPIDURAL; INTRACAUDAL; PERINEURAL AS NEEDED
Status: DISCONTINUED | OUTPATIENT
Start: 2025-06-28 | End: 2025-06-28 | Stop reason: HOSPADM

## 2025-06-28 RX ORDER — NICOTINE POLACRILEX 4 MG
30 LOZENGE BUCCAL
Status: DISCONTINUED | OUTPATIENT
Start: 2025-06-28 | End: 2025-06-28 | Stop reason: HOSPADM

## 2025-06-28 RX ORDER — MORPHINE SULFATE 4 MG/ML
4 INJECTION, SOLUTION INTRAMUSCULAR; INTRAVENOUS EVERY 10 MIN PRN
Status: DISCONTINUED | OUTPATIENT
Start: 2025-06-28 | End: 2025-06-28 | Stop reason: HOSPADM

## 2025-06-28 RX ORDER — ONDANSETRON 2 MG/ML
INJECTION INTRAMUSCULAR; INTRAVENOUS AS NEEDED
Status: DISCONTINUED | OUTPATIENT
Start: 2025-06-28 | End: 2025-06-28 | Stop reason: SURG

## 2025-06-28 RX ORDER — SODIUM CHLORIDE, SODIUM LACTATE, POTASSIUM CHLORIDE, CALCIUM CHLORIDE 600; 310; 30; 20 MG/100ML; MG/100ML; MG/100ML; MG/100ML
INJECTION, SOLUTION INTRAVENOUS CONTINUOUS PRN
Status: DISCONTINUED | OUTPATIENT
Start: 2025-06-28 | End: 2025-06-28 | Stop reason: SURG

## 2025-06-28 RX ORDER — NICOTINE POLACRILEX 4 MG
15 LOZENGE BUCCAL
Status: DISCONTINUED | OUTPATIENT
Start: 2025-06-28 | End: 2025-06-28 | Stop reason: HOSPADM

## 2025-06-28 RX ORDER — HYDROMORPHONE HYDROCHLORIDE 1 MG/ML
0.6 INJECTION, SOLUTION INTRAMUSCULAR; INTRAVENOUS; SUBCUTANEOUS EVERY 5 MIN PRN
Status: DISCONTINUED | OUTPATIENT
Start: 2025-06-28 | End: 2025-06-28 | Stop reason: HOSPADM

## 2025-06-28 RX ORDER — HYDROMORPHONE HYDROCHLORIDE 1 MG/ML
0.4 INJECTION, SOLUTION INTRAMUSCULAR; INTRAVENOUS; SUBCUTANEOUS EVERY 5 MIN PRN
Status: DISCONTINUED | OUTPATIENT
Start: 2025-06-28 | End: 2025-06-28 | Stop reason: HOSPADM

## 2025-06-28 RX ORDER — ROCURONIUM BROMIDE 10 MG/ML
INJECTION, SOLUTION INTRAVENOUS AS NEEDED
Status: DISCONTINUED | OUTPATIENT
Start: 2025-06-28 | End: 2025-06-28 | Stop reason: SURG

## 2025-06-28 RX ORDER — MORPHINE SULFATE 4 MG/ML
2 INJECTION, SOLUTION INTRAMUSCULAR; INTRAVENOUS EVERY 10 MIN PRN
Status: DISCONTINUED | OUTPATIENT
Start: 2025-06-28 | End: 2025-06-28 | Stop reason: HOSPADM

## 2025-06-28 RX ORDER — HEPARIN SODIUM 1000 [USP'U]/ML
INJECTION, SOLUTION INTRAVENOUS; SUBCUTANEOUS AS NEEDED
Status: DISCONTINUED | OUTPATIENT
Start: 2025-06-28 | End: 2025-06-28 | Stop reason: SURG

## 2025-06-28 RX ADMIN — ONDANSETRON 4 MG: 2 INJECTION INTRAMUSCULAR; INTRAVENOUS at 12:52:00

## 2025-06-28 RX ADMIN — GABAPENTIN 800 MG: 400 CAPSULE ORAL at 08:32:00

## 2025-06-28 RX ADMIN — HYDROMORPHONE HYDROCHLORIDE 0.2 MG: 1 INJECTION, SOLUTION INTRAMUSCULAR; INTRAVENOUS; SUBCUTANEOUS at 03:47:00

## 2025-06-28 RX ADMIN — GABAPENTIN 800 MG: 400 CAPSULE ORAL at 21:07:00

## 2025-06-28 RX ADMIN — FLUTICASONE PROPIONATE AND SALMETEROL 1 PUFF: 250; 50 POWDER RESPIRATORY (INHALATION) at 21:18:00

## 2025-06-28 RX ADMIN — HYDROMORPHONE HYDROCHLORIDE 0.2 MG: 1 INJECTION, SOLUTION INTRAMUSCULAR; INTRAVENOUS; SUBCUTANEOUS at 08:28:00

## 2025-06-28 RX ADMIN — HEPARIN SODIUM 5000 UNITS: 1000 INJECTION, SOLUTION INTRAVENOUS; SUBCUTANEOUS at 13:36:00

## 2025-06-28 RX ADMIN — SODIUM CHLORIDE, SODIUM LACTATE, POTASSIUM CHLORIDE, CALCIUM CHLORIDE: 600; 310; 30; 20 INJECTION, SOLUTION INTRAVENOUS at 12:36:00

## 2025-06-28 RX ADMIN — ASCORBIC ACID 500 MG: 500 MG TABLET ORAL at 08:32:00

## 2025-06-28 RX ADMIN — ETOMIDATE 8 MG: 2 INJECTION INTRAVENOUS at 12:47:00

## 2025-06-28 RX ADMIN — PANTOPRAZOLE SODIUM 40 MG: 40 TABLET, DELAYED RELEASE ORAL at 07:32:00

## 2025-06-28 RX ADMIN — HYDROMORPHONE HYDROCHLORIDE 0.4 MG: 1 INJECTION, SOLUTION INTRAMUSCULAR; INTRAVENOUS; SUBCUTANEOUS at 21:23:00

## 2025-06-28 RX ADMIN — ASPIRIN 81 MG: 81 TABLET ORAL at 08:32:00

## 2025-06-28 RX ADMIN — ENOXAPARIN SODIUM 40 MG: 100 INJECTION SUBCUTANEOUS at 21:10:00

## 2025-06-28 RX ADMIN — EPHEDRINE SULFATE 5 MG: 50 INJECTION, SOLUTION INTRAVENOUS at 13:24:00

## 2025-06-28 RX ADMIN — IPRATROPIUM BROMIDE AND ALBUTEROL SULFATE 3 ML: 2.5; .5 SOLUTION RESPIRATORY (INHALATION) at 11:05:00

## 2025-06-28 RX ADMIN — ALLOPURINOL 100 MG: 100 TABLET ORAL at 08:32:00

## 2025-06-28 RX ADMIN — DEXTROSE, SODIUM CHLORIDE, SODIUM LACTATE, POTASSIUM CHLORIDE, AND CALCIUM CHLORIDE: 5; .6; .31; .03; .02 INJECTION, SOLUTION INTRAVENOUS at 02:59:00

## 2025-06-28 RX ADMIN — INSULIN DEGLUDEC 10 UNITS: 100 INJECTION, SOLUTION SUBCUTANEOUS at 21:13:00

## 2025-06-28 RX ADMIN — HYDROMORPHONE HYDROCHLORIDE 0.4 MG: 1 INJECTION, SOLUTION INTRAMUSCULAR; INTRAVENOUS; SUBCUTANEOUS at 16:19:00

## 2025-06-28 RX ADMIN — ROCURONIUM BROMIDE 40 MG: 10 INJECTION, SOLUTION INTRAVENOUS at 12:47:00

## 2025-06-28 RX ADMIN — ALLOPURINOL 100 MG: 100 TABLET ORAL at 21:00:00

## 2025-06-28 RX ADMIN — AMLODIPINE BESYLATE 10 MG: 10 TABLET ORAL at 21:07:00

## 2025-06-28 RX ADMIN — NICOTINE 1 PATCH: 21 MG/24HR PATCH, TRANSDERMAL 24 HOURS TRANSDERMAL at 08:33:00

## 2025-06-28 RX ADMIN — EPHEDRINE SULFATE 5 MG: 50 INJECTION, SOLUTION INTRAVENOUS at 13:48:00

## 2025-06-28 RX ADMIN — HYDROMORPHONE HYDROCHLORIDE 0.2 MG: 1 INJECTION, SOLUTION INTRAMUSCULAR; INTRAVENOUS; SUBCUTANEOUS at 11:21:00

## 2025-06-28 RX ADMIN — FENOFIBRATE 134 MG: 134 CAPSULE ORAL at 08:32:00

## 2025-06-28 RX ADMIN — DOCUSATE SODIUM 100 MG: 100 CAPSULE, LIQUID FILLED ORAL at 08:32:00

## 2025-06-28 NOTE — PROGRESS NOTES
[facility]  part of [facility]    Progress Note    Mandi Moran Patient Status:  Inpatient    1955 MRN [id number]   Location [facility] OPERATING ROOM Attending Amira Kitchen *   Hosp Day # 1 PCP Johnny Westfall MD     Chief complaint pad     Subjective:   Mandi Moran is a(n) 70 year old female pt seen prior to surgery. C/o leg pain and numbness this am in legs.     ROS:   No cp, sob   No c/d   No n/v     Objective:   Blood pressure 143/60, pulse 86, temperature 97.4 °F (36.3 °C), temperature source Temporal, resp. rate 15, height 5' 5\" (1.651 m), weight 178 lb 9.2 oz (81 kg), SpO2 94%.      Intake/Output Summary (Last 24 hours) at 2025 1429  Last data filed at 2025 1302  Gross per 24 hour   Intake 1403.33 ml   Output 3350 ml   Net -1946.67 ml       Patient Weight(s) for the past 336 hrs:   Weight   25 1112 178 lb 9.2 oz (81 kg)   25 0800 187 lb 9.6 oz (85.1 kg)   25 1401 181 lb (82.1 kg)           General appearance: alert, appears stated age and cooperative  Exam deferred due to emergent sx        Medicines:   Current Hospital Medications[1]            Blood Sugar Medications            insulin glargine (LANTUS SOLOSTAR) 100 UNIT/ML Subcutaneous Solution Pen-injector    metFORMIN HCl 1000 MG Oral Tab            Blood Pressure and Cardiac Medications            amLODIPine 10 MG Oral Tab            Medication Infusions[2]        Lab Results   Component Value Date    WBC 10.6 2025    HGB 9.6 (L) 2025    HCT 29.0 (L) 2025    .0 2025    CREATSERUM 1.34 (H) 2025    BUN 20 2025     2025    K 4.4 2025     (H) 2025    CO2 25.0 2025    GLU 95 2025    CA 8.5 (L) 2025    ALB 4.7 2024    ALKPHO 60 2024    BILT 0.3 2024    TP 7.7 2024    AST 22 2024    ALT 18 2024       XR CHEST AP PORTABLE  (CPT=71045)  Result Date:  6/28/2025  CONCLUSION: No acute cardiopulmonary abnormality. Electronically Verified and Signed by Attending Radiologist: Rickey Zacarias MD 6/28/2025 7:35 AM Workstation: [id number]    CTA ABDOMEN/PELVIS LOWER EXT BILAT W RUNOFF (SCM=45246)  Result Date: 6/27/2025  CONCLUSION: 1.  Patent aortobiiliac stent graft for repair of abdominal aortic aneurysm. No endoleak. 2.  Right popliteal artery occlusion. 3.  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 4.  Moderate sized right groin hematoma extending laterally. Electronically Verified and Signed by Attending Radiologist: Miguel Pace MD 6/27/2025 8:09 PM Workstation: [id number]    EKG 12 Lead  Result Date: 6/28/2025  Normal sinus rhythm Normal ECG When compared with ECG of 20-JUN-2025 14:59, Criteria for Anterior infarct are no longer Present Criteria for Inferior infarct are no longer Present      Results:     CBC:    Lab Results   Component Value Date    WBC 10.6 06/28/2025    WBC 10.3 06/20/2025    WBC 9.5 12/03/2024     Lab Results   Component Value Date    HGB 9.6 (L) 06/28/2025    HGB 13.2 06/20/2025    HGB 13.8 12/03/2024      Lab Results   Component Value Date    .0 06/28/2025    .0 06/20/2025     12/03/2024       Recent Labs   Lab 06/28/25  0510   GLU 95   BUN 20   CREATSERUM 1.34*   CA 8.5*      K 4.4   *   CO2 25.0           Assessment and Plan:         ASSESSMENT AND PLAN:    1.       Abdominal aortic aneurysm, status post endovascular graft repair.    Now with compartment syndrome of left leg and thrombus in popliteal artery of right foot plan for OR now per Dr Najjar for fasciotomy and thrombectomy  Pain control.  Monitor hemodynamic status via left radial arterial line.  DVT prophylaxis.  2.       Peripheral arterial disease.as above   3.       Chronic obstructive pulmonary disease.  Continue home medication and monitor. Duonebs. Nicotine patch.   4.       Gout.  Continue home medication.  allopurinol  5.       Hyperlipidemia.  Continue home medication.cont fenofibrate   6.       Essential hypertension.  Continue home medication and monitor. Amlodipine   7.       Chronic kidney disease stage 3.  Continue to monitor postoperatively.  Near baseline of 1.6. cont to monitor   8.       Diabetes mellitus type 2.  Continue home medications.  Monitor Accu-Cheks.  Sliding scale insulin.  Cont iss and tresiba 10             Amira Villalta,          Chart reviewed, including current vitals, notes, labs and imaging  Labs ordered and medications adjusted as outlined above  Coordinate care with care team/consultants  Discussed with patient results of tests, management plan as outlined above, and the need for ongoing hospitalization  D/w RN     [facility]        6/28/2025     **Certification      PHYSICIAN Certification of Need for Inpatient Hospitalization - Initial Certification    Patient will require inpatient services that will reasonably be expected to span two midnight's based on the clinical documentation in H+P.   Based on patients current state of illness, I anticipate that, after discharge, patient will require TBD.        Supplementary Documentation:   DVT Mechanical Prophylaxis:   SCDs,    DVT Pharmacologic Prophylaxis   Medication    heparin (Porcine) 50,000 units in 500 mL sodium chloride 0.9% irrigation    [Transfer Hold] enoxaparin (Lovenox) 40 MG/0.4ML SUBQ injection 40 mg     Facility-Administered Medications Ordered in Other Encounters (Includes Only Anticoagulants)   Medication    heparin (Porcine) 1000 UNIT/ML injection         DVT Pharmacologic prophylaxis: Aspirin 81 mg      Code Status: Not on file  Carrillo: Carrillo catheter in place  Carrillo Duration (in days): 2  Central line:    NORY:         **Certification      PHYSICIAN Certification of Need for Inpatient Hospitalization - Initial Certification    Patient will require inpatient services that will reasonably be expected to span two  midnight's based on the clinical documentation in H+P.   Based on patients current state of illness, I anticipate that, after discharge, patient will require TBD.                  [1]   Current Facility-Administered Medications   Medication Dose Route Frequency    glucose (Dex4) 15 GM/59ML oral liquid 15 g  15 g Oral Q15 Min PRN    Or    glucose (Glutose) 40% oral gel 15 g  15 g Oral Q15 Min PRN    Or    glucose-vitamin C (Dex-4) chewable tab 4 tablet  4 tablet Oral Q15 Min PRN    Or    dextrose 50% injection 50 mL  50 mL Intravenous Q15 Min PRN    Or    glucose (Dex4) 15 GM/59ML oral liquid 30 g  30 g Oral Q15 Min PRN    Or    glucose (Glutose) 40% oral gel 30 g  30 g Oral Q15 Min PRN    Or    glucose-vitamin C (Dex-4) chewable tab 8 tablet  8 tablet Oral Q15 Min PRN    lactated ringers infusion   Intravenous Continuous    lactated ringers IV bolus 500 mL  500 mL Intravenous Once PRN    atropine 0.1 MG/ML injection 0.5 mg  0.5 mg Intravenous PRN    naloxone (Narcan) 0.4 MG/ML injection 80 mcg  80 mcg Intravenous PRN    fentaNYL (Sublimaze) 50 mcg/mL injection 25 mcg  25 mcg Intravenous Q5 Min PRN    fentaNYL (Sublimaze) 50 mcg/mL injection 50 mcg  50 mcg Intravenous Q5 Min PRN    HYDROmorphone (Dilaudid) 1 MG/ML injection 0.2 mg  0.2 mg Intravenous Q5 Min PRN    HYDROmorphone (Dilaudid) 1 MG/ML injection 0.4 mg  0.4 mg Intravenous Q5 Min PRN    HYDROmorphone (Dilaudid) 1 MG/ML injection 0.6 mg  0.6 mg Intravenous Q5 Min PRN    morphINE PF 4 MG/ML injection 2 mg  2 mg Intravenous Q10 Min PRN    morphINE PF 4 MG/ML injection 4 mg  4 mg Intravenous Q10 Min PRN    morphINE PF 10 MG/ML injection 6 mg  6 mg Intravenous Q10 Min PRN    gelfoam/thrombin (OR to mix)    PRN    heparin (Porcine) 50,000 units in 500 mL sodium chloride 0.9% irrigation    PRN    [Transfer Hold] pantoprazole (Protonix) DR tab 40 mg  40 mg Oral QAM AC    [Transfer Hold] cholecalciferol (Vitamin D3) tab 5,000 Units  5,000 Units Oral Daily     [Transfer Hold] ascorbic acid (Vitamin C) tab 500 mg  500 mg Oral Daily    [Transfer Hold] albuterol (Ventolin HFA) 108 (90 Base) MCG/ACT inhaler 1 puff  1 puff Inhalation Q4H PRN    [Transfer Hold] amLODIPine (Norvasc) tab 10 mg  10 mg Oral Nightly    [Transfer Hold] gabapentin (Neurontin) cap 800 mg  800 mg Oral BID    [Transfer Hold] fluticasone-salmeterol (Advair Diskus) 250-50 MCG/ACT inhaler 1 puff  1 puff Inhalation BID    [Transfer Hold] allopurinol (Zyloprim) tab 100 mg  100 mg Oral BID    [Transfer Hold] aspirin DR tab 81 mg  81 mg Oral Daily    [Transfer Hold] fenofibrate micronized (Lofibra) cap 134 mg  134 mg Oral Daily with breakfast    [Transfer Hold] insulin degludec (Tresiba) 100 units/mL flextouch 10 Units  10 Units Subcutaneous Nightly    dextrose in lactated ringers 5% infusion   Intravenous Continuous    [Transfer Hold] HYDROcodone-acetaminophen (Norco) 5-325 MG per tab 1 tablet  1 tablet Oral Q4H PRN    Or    [Transfer Hold] HYDROcodone-acetaminophen (Norco) 5-325 MG per tab 2 tablet  2 tablet Oral Q4H PRN    [Transfer Hold] ondansetron (Zofran) 4 MG/2ML injection 4 mg  4 mg Intravenous Q6H PRN    [Transfer Hold] metoclopramide (Reglan) 5 mg/mL injection 5 mg  5 mg Intravenous Q8H PRN    [Transfer Hold] enoxaparin (Lovenox) 40 MG/0.4ML SUBQ injection 40 mg  40 mg Subcutaneous Nightly    [Transfer Hold] insulin aspart (NovoLOG) 100 Units/mL FlexPen 1-7 Units  1-7 Units Subcutaneous TID CC and HS    [Transfer Hold] ipratropium-albuterol (Duoneb) 0.5-2.5 (3) MG/3ML inhalation solution 3 mL  3 mL Nebulization Q6H PRN    [Transfer Hold] acetaminophen (Tylenol) tab 650 mg  650 mg Oral Q6H PRN    [Transfer Hold] HYDROmorphone (Dilaudid) 1 MG/ML injection 0.1 mg  0.1 mg Intravenous Q2H PRN    Or    [Transfer Hold] HYDROmorphone (Dilaudid) 1 MG/ML injection 0.2 mg  0.2 mg Intravenous Q2H PRN    Or    [Transfer Hold] HYDROmorphone (Dilaudid) 1 MG/ML injection 0.4 mg  0.4 mg Intravenous Q2H PRN     [Transfer Hold] nicotine (Nicoderm CQ) 21 MG/24HR patch 1 patch  1 patch Transdermal Daily    [Transfer Hold] docusate sodium (Colace) cap 100 mg  100 mg Oral BID    [Transfer Hold] polyethylene glycol (PEG 3350) (Miralax) 17 g oral packet 17 g  17 g Oral Daily PRN    [Transfer Hold] magnesium hydroxide (Milk of Magnesia) 400 MG/5ML oral suspension 30 mL  30 mL Oral Daily PRN    [Transfer Hold] bisacodyl (Dulcolax) 10 MG rectal suppository 10 mg  10 mg Rectal Daily PRN    [Transfer Hold] fleet enema (Fleet) rectal enema 133 mL  1 enema Rectal Once PRN     Facility-Administered Medications Ordered in Other Encounters   Medication Dose Route Frequency    lactated ringers infusion   Intravenous Continuous PRN    fentaNYL (Sublimaze) 50 mcg/mL injection   Intravenous PRN    etomidate (Amidate) 2 mg/mL injection   Intravenous PRN    rocuronium (Zemuron) 50 mg/5mL injection   Intravenous PRN    ondansetron (Zofran) 4 MG/2ML injection   Intravenous PRN    ceFAZolin (Ancef) 2g in 10mL IV syringe premix   Intravenous PRN    ePHEDrine sulfate 50 mg/mL injection   Intravenous PRN    heparin (Porcine) 1000 UNIT/ML injection   Intravenous PRN    sugammadex (Bridion) 200 MG/2ML injection   Intravenous PRN   [2]    lactated ringers      dextrose in lactated ringers 100 mL/hr at 06/28/25 025

## 2025-06-28 NOTE — PROGRESS NOTES
PT orders received chart revived. RN requesting to cancel PT orders NNO to resume s/p LEFT LEG FASCIOTOMY, RIGHT LEG THROMBECTOMY. PT will D/C current orders. Please provide new PT orders with safety measures and activity recommendations as indicated s/p surgical procedure as medical progress allows when pt is appropriate for PT.

## 2025-06-28 NOTE — PLAN OF CARE
Problem: Patient Centered Care  Goal: Patient preferences are identified and integrated in the patient's plan of care  Description: Interventions:  - What would you like us to know as we care for you? She has a supportive family.  - Provide timely, complete, and accurate information to patient/family  - Incorporate patient and family knowledge, values, beliefs, and cultural backgrounds into the planning and delivery of care  - Encourage patient/family to participate in care and decision-making at the level they choose  - Honor patient and family perspectives and choices  Outcome: Progressing     Problem: Diabetes/Glucose Control  Goal: Glucose maintained within prescribed range  Description: INTERVENTIONS:  - Monitor Blood Glucose as ordered  - Assess for signs and symptoms of hyperglycemia and hypoglycemia  - Administer ordered medications to maintain glucose within target range  - Assess barriers to adequate nutritional intake and initiate nutrition consult as needed  - Instruct patient on self management of diabetes  Outcome: Progressing     Problem: Patient/Family Goals  Goal: Patient/Family Long Term Goal  Description: Patient's Long Term Goal: Go home    Interventions:  - Monitor site post procedure  - monitor vitals  - PT/OT  - Educate patient on post procedure protocol  - See additional Care Plan goals for specific interventions  Outcome: Progressing  Goal: Patient/Family Short Term Goal  Description: Patient's Short Term Goal: Feel better    Interventions:   - Surgery consult  - AAA repair  - See additional Care Plan goals for specific interventions  Outcome: Progressing

## 2025-06-28 NOTE — ANESTHESIA PROCEDURE NOTES
Airway  Date/Time: 6/28/2025 12:48 PM  Reason: Elective      General Information and Staff   Patient location during procedure: OR  Anesthesiologist: Malu Groves MD  Performed: anesthesiologist   Performed by: Malu Groves MD  Authorized by: Malu Groves MD        Indications and Patient Condition  Indications for airway management: anesthesia  Sedation level: deep      Preoxygenated: yesPatient position: sniffing    Mask difficulty assessment: 1 - vent by mask  Planned trial extubation    Final Airway Details    Final airway type: endotracheal airway    Successful airway: ETT  Cuffed: yes   Successful intubation technique: direct laryngoscopy  Facilitating devices/methods: intubating stylet and cricoid pressure  Endotracheal tube insertion site: oral  Blade: GlideScope  Blade size: #3  ETT size (mm): 7.0    Cormack-Lehane Classification: grade I - full view of glottis  Placement verified by: capnometry   Measured from: teeth  ETT to teeth (cm): 21  Number of attempts at approach: 1  Ventilation between attempts: none  Number of other approaches attempted: 0

## 2025-06-28 NOTE — ANESTHESIA PREPROCEDURE EVALUATION
Anesthesia PreOp Note    HPI:     Mandi Moran is a 70 year old female who presents for preoperative consultation requested by: Najjar, Samer F, MD    Date of Surgery: 6/27/2025    * No procedures listed *  Indication: * No pre-op diagnosis entered *    Relevant Problems   No relevant active problems       NPO:  Last Liquid Consumption Date: 06/27/25  Last Liquid Consumption Time: 0000  Last Solid Consumption Date: 06/26/25  Last Solid Consumption Time: 2230  Last Liquid Consumption Date: 06/27/25          History Review:  Patient Active Problem List    Diagnosis Date Noted    Preop testing 06/27/2025    Statin intolerance 04/30/2025    Bilateral hearing loss 04/30/2025    Spinal stenosis of lumbar region with neurogenic claudication 01/07/2025    Abdominal aortic aneurysm (AAA) without rupture 01/07/2025    Abdominal discomfort 11/20/2024    Type 2 diabetes mellitus with stage 3a chronic kidney disease, without long-term current use of insulin (HCC) 11/20/2024    Hyperlipidemia 11/20/2024    Primary hypertension 11/20/2024    Stage 3a chronic kidney disease (HCC) 11/20/2024    Chronic obstructive pulmonary disease (HCC) 11/20/2024    Chronic gout without tophus 11/20/2024    Cigarette smoker 11/20/2024    Health maintenance examination 11/20/2024       Past Medical History[1]    Past Surgical History[2]    Prescriptions Prior to Admission[3]  Current Medications and Prescriptions Ordered in Epic[4]    Allergies[5]    Family History[6]  Social Hx on file[7]    Available pre-op labs reviewed.  Lab Results   Component Value Date    WBC 10.6 06/28/2025    RBC 3.32 (L) 06/28/2025    HGB 9.6 (L) 06/28/2025    HCT 29.0 (L) 06/28/2025    MCV 87.3 06/28/2025    MCH 28.9 06/28/2025    MCHC 33.1 06/28/2025    RDW 15.8 (H) 06/28/2025    .0 06/28/2025     Lab Results   Component Value Date     06/28/2025    K 4.4 06/28/2025     (H) 06/28/2025    CO2 25.0 06/28/2025    BUN 20 06/28/2025    CREATSERUM 1.34  (H) 06/28/2025    GLU 95 06/28/2025    PGLU 141 (H) 06/28/2025    CA 8.5 (L) 06/28/2025          Vital Signs:  Body mass index is 29.72 kg/m².   height is 1.651 m (5' 5\") and weight is 81 kg (178 lb 9.2 oz). Her temporal temperature is 97.4 °F (36.3 °C). Her blood pressure is 128/64 and her pulse is 98. Her respiration is 26 and oxygen saturation is 93%.   Vitals:    06/28/25 0800 06/28/25 0900 06/28/25 1000 06/28/25 1112   BP:   128/64    Pulse: 93 92 98    Resp:  20 26    Temp: 97.4 °F (36.3 °C)      TempSrc: Temporal      SpO2: 96% 93% 93%    Weight:    81 kg (178 lb 9.2 oz)   Height:            Anesthesia Evaluation     Patient summary reviewed and Nursing notes reviewed    Airway   Mallampati: I  TM distance: >3 FB  Neck ROM: full  Dental - Dentition appears grossly intact   (+) upper dentures and partials    Pulmonary - normal exam    breath sounds clear to auscultation  (+) COPD  Cardiovascular   Exercise tolerance: poor  (+) hypertension    NYHA Classification: II  ECG reviewed  Rhythm: regular  Rate: normal  ROS comment: Narrative  Performed by: MUSE  Normal sinus rhythm  Normal ECG  When compared with ECG of 20-JUN-2025 14:59,  Criteria for Anterior infarct are no longer Present  Criteria for Inferior infarct are no longer Present  Specimen Collected: 06/28/25 05:26 Last Resulted: 06/28/25 08:54     Cardiology visit '24 stable      Neuro/Psych    (+)  neuromuscular disease,  depression      GI/Hepatic/Renal    (+) GERD well controlled  (-) liver disease, renal disease    Endo/Other    (+) diabetes mellitus type 2 well controlled using insulin, arthritis    Comments: ASSESSMENT AND PLAN:    1.       Abdominal aortic aneurysm, status post endovascular graft repair.  Pain control.  Monitor hemodynamic status via left radial arterial line.  DVT prophylaxis.  2.       Peripheral arterial disease.  3.       Chronic obstructive pulmonary disease.  Continue home medication and monitor.   4.       Gout.  Continue  home medication.  5.       Hyperlipidemia.  Continue home medication.  6.       Essential hypertension.  Continue home medication and monitor.  7.       Chronic kidney disease stage 3.  Continue to monitor postoperatively.  8.       Diabetes mellitus type 2.  Continue home medications.  Monitor Accu-Cheks.  Sliding scale insulin.     Dictated By Veronica Yung MD  d:06/27/2025 15:10:20  t:06/27/2025 15:30:58    #Spinal stenosis  #L3 lumbar fracture  -CT lumbar spine  12/22/24 - IMPRESSION: A large disc extrusion is demonstrated at L2-L3. This results in moderate tosevere spinal canal stenosis and severe left subarticular zone stenosis. Thereis moderate left foraminal stenosis.Severe spinal canal stenosis at L4-L5. Marked degenerative changes are notedelsewhere in the lumbar spine, as detailed in the body of the report. Mildly displaced fractures involving the superior endplate and osteophytes ofthe L3 vertebral body, as detailed above.  -s/p Laminotomy with decompression of nerve roots, including partial facetectomy, foraminotomy, and excision of herniated intervertebral disc 12/2024  -neurosurgeon Dr Aneesh Taveras -no longer seeing  -tylenol for pain  -completed home PT  -pain is improved   -walking with walker or cane     #DM  -Hba1c: 6.2 12/4/24, 6.1 4/30/25  -Microalbuminuria: 12/2024, repeat urine microalbumin today  -B12: 12/2024  -Pneumonia vaccine: Prevnar 20 today  -HepB:  -Eye exam: Referred to ophthalmology   -Diabetic foot exam: 4/30/25 Bilateral barefoot skin diabetic exam completed. Visualized feet and the appearance is normal. Bilateral monofilament/sensation of both feet is decreased throughout the bilateral feet except in the left 4th and 5th digits and right 1first, 3rd and 5th digits. Pulsation pedal pulse exam of both lower legs/feet is normal.  -Current medications:metformin 1000 mg twice daily, tresiba 10 U nightly  -Blood sugars:      #Abdominal aortic aneurysm   -vascular Dr Najjar  -consideration for repair.   -aspirin 81 mg daily - advised to start  -smoking cessation        #Diabetic neuropathy  -gabapentin 800 mg in the morning 400 mg in the afternoon and 800 mg at night      #HLD  #Hypertriglyceridemia   -fenofibrate 145 mg daily - not taking, advised to restart  -pravastatin 10 mg nightly - aching, discontinued  -prior meds: atorvastatin with achiness, rosuvastatin with shakiness  -will refer to cardiology     #HTN  -amlodipine 10 mg nightly      #Gout   -allopurinol 200 mg in the morning, 100 mg at night  -uric acid 12/2024 5.3     #COPD  -advair 25--50 mcg 1 puff twice daily - out of medication, needs to restart  -albuterol as needed  -still smoking        #Abdominal discomfort  -pantoprazole 40 mg  -has seen GI for EGD and colonoscopy  -pt reports unremarkable EGD  -pt reports polyps and hemorrhoids for colonoscopy  -no reports available   -need to acquire record results - refer to GI as needed    #Cigarette smoker  -1.5 ppd     #CKD  -avoid nephrotoxic medications     Abdominal   (+) obese     Other findings: 06/28/25 05:10  Glucose: 95  Sodium: 142  Potassium: 4.4  Chloride: 114 (H)  Carbon Dioxide, Total: 25.0  BUN: 20  CREATININE: 1.34 (H)  CALCIUM: 8.5 (L)  BUN/CREATININE RATIO: 14.9  EGFR: 42 (L)  ANION GAP: 3  CALCULATED OSMOLALITY: 296 (H)  WBC: 10.6  Hemoglobin: 9.6 (L)  Hematocrit: 29.0 (L)  Platelet Count: 186.0  RBC: 3.32 (L)  MCH: 28.9  MCHC: 33.1  MCV: 87.3  RDW: 15.8 (H)  RDW-SD: 50.4 (H)    06/28/25 07:34  POC GLUCOSE: 141 (H)      (H): Data is abnormally high  (H): Data is abnormally high  (L): Data is abnormally low            Anesthesia Plan:   ASA:  3  Emergent    Plan:   General  Monitors and Lines:   A-line  Airway:  ETT  Informed Consent Plan and Risks Discussed With:  Patient, child/children and sibling  Discussed plan with:  Attending and surgeon  Provider Attestation (if preop done by other):  GA/ETT/PONV,dental damagese ct      I have informed Mandi RODRIGUEZ  Dean and/or legal guardian or family member of the nature of the anesthetic plan, benefits, risks including possible dental damage if relevant, major complications, and any alternative forms of anesthetic management.   All of the patient's questions were answered to the best of my ability. The patient desires the anesthetic management as planned.  Edward Stack MD  2025 9:55 AM  Present on Admission:   Abdominal aortic aneurysm (AAA) without rupture   Chronic obstructive pulmonary disease (HCC)   (Resolved) Diabetic polyneuropathy associated with type 2 diabetes mellitus (HCC)   Hyperlipidemia   Primary hypertension   Stage 3a chronic kidney disease (HCC)   Type 2 diabetes mellitus with stage 3a chronic kidney disease, without long-term current use of insulin (HCC)   Chronic gout without tophus           [1]   Past Medical History:   Arthritis    Back problem    COPD (chronic obstructive pulmonary disease) (HCC)    Depression    Diabetes (HCC)    Esophageal reflux    Essential hypertension    Gout    High blood pressure    High cholesterol    Hyperlipidemia    Incontinence    Neuropathy    Osteoarthritis    Peripheral vascular disease    Visual impairment   [2]   Past Surgical History:  Procedure Laterality Date          Cholecystectomy      Colonoscopy      Endovas aaa repr w/3-p part  2025    Endovascular abdominal aortic aneurysm repair    Laminectomy  2024    Laminotomy with decompression of nerve roots, including partial facetectomy, foraminotomy, and excision of herniated intervertebral disc    Upper gi endoscopy,exam     [3]   Medications Prior to Admission   Medication Sig Dispense Refill Last Dose/Taking    MAGNESIUM GLYCINATE OR Take by mouth at bedtime.   2025    gabapentin 400 MG Oral Cap Gabapentin 800 mg in the morning, 400 mg in the afternoon, 800 mg at night (Patient taking differently: Gabapentin 800 mg in the morning, 400 mg as needed in the afternoon and 400 mg-800  mg at bedtime) 360 capsule 3 6/27/2025 Morning    fenofibrate 145 MG Oral Tab Take 1 tablet (145 mg total) by mouth daily. 90 tablet 1 6/26/2025    fluticasone-salmeterol 250-50 MCG/ACT Inhalation Aerosol Powder, Breath Activated Inhale 1 puff into the lungs 2 (two) times daily. 60 each 3 6/27/2025 Morning    insulin glargine (LANTUS SOLOSTAR) 100 UNIT/ML Subcutaneous Solution Pen-injector Inject 10 Units into the skin nightly. 15 mL 3 6/26/2025    allopurinol 100 MG Oral Tab Allopurinol 200 mg in the morning and 100 mg at night 270 tablet 3 6/27/2025 Morning    aspirin (ASPIRIN 81) 81 MG Oral Tab EC Take 1 tablet (81 mg total) by mouth daily. 90 tablet 0 6/27/2025 Morning    metFORMIN HCl 1000 MG Oral Tab Take 1 tablet (1,000 mg total) by mouth 2 (two) times daily with meals. 180 tablet 1 6/26/2025    pantoprazole 40 MG Oral Tab EC    6/27/2025 Morning    cholecalciferol 125 MCG (5000 UT) Oral Cap Take 1 capsule (5,000 Units total) by mouth daily.   6/26/2025    Ascorbic Acid (VITAMIN C) 1000 MG Oral Tab Take 0.5 tablets (500 mg total) by mouth daily.   6/26/2025    B Complex Vitamins (PA B-COMPLEX WITH B-12 OR)    6/26/2025    albuterol 108 (90 Base) MCG/ACT Inhalation Aero Soln every 28 days. FOR 21 DAYS IN, THEN REMOVE FOR 7 DAYS   6/27/2025 Morning    acetaminophen (TYLENOL EXTRA STRENGTH) 500 MG Oral Tab Take 4 tablets (2,000 mg total) by mouth in the morning and 4 tablets (2,000 mg total) before bedtime.   6/27/2025 Morning    amLODIPine 10 MG Oral Tab Take 1 tablet (10 mg total) by mouth at bedtime.       BD PEN NEEDLE MINI U/F 31G X 5 MM Does not apply Misc 1 kit 2 (two) times daily.       Lancets (ONETOUCH DELICA PLUS [id number]) Does not apply Misc Apply 1 Application topically 2 (two) times daily.      [4]   Current Facility-Administered Medications Ordered in Epic   Medication Dose Route Frequency Provider Last Rate Last Admin    pantoprazole (Protonix) DR tab 40 mg  40 mg Oral QAM AC Najjar, Samer F, MD    40 mg at 06/28/25 0732    cholecalciferol (Vitamin D3) tab 5,000 Units  5,000 Units Oral Daily Najjar, Samer F, MD   5,000 Units at 06/28/25 0832    ascorbic acid (Vitamin C) tab 500 mg  500 mg Oral Daily Najjar, Samer F, MD   500 mg at 06/28/25 0832    albuterol (Ventolin HFA) 108 (90 Base) MCG/ACT inhaler 1 puff  1 puff Inhalation Q4H PRN Najjar, Samer F, MD        amLODIPine (Norvasc) tab 10 mg  10 mg Oral Nightly Najjar, Samer F, MD   10 mg at 06/27/25 2021    gabapentin (Neurontin) cap 800 mg  800 mg Oral BID Najjar, Samer F, MD   800 mg at 06/28/25 0832    fluticasone-salmeterol (Advair Diskus) 250-50 MCG/ACT inhaler 1 puff  1 puff Inhalation BID Najjar, Samer F, MD        allopurinol (Zyloprim) tab 100 mg  100 mg Oral BID Najjar, Samer F, MD   100 mg at 06/28/25 0832    aspirin DR tab 81 mg  81 mg Oral Daily Najjar, Samer F, MD   81 mg at 06/28/25 0832    fenofibrate micronized (Lofibra) cap 134 mg  134 mg Oral Daily with breakfast Najjar, Samer F, MD   134 mg at 06/28/25 0832    insulin degludec (Tresiba) 100 units/mL flextouch 10 Units  10 Units Subcutaneous Nightly Najjar, Samer F, MD   10 Units at 06/27/25 2155    dextrose in lactated ringers 5% infusion   Intravenous Continuous Najjar, Samer F,  mL/hr at 06/28/25 0259 New Bag at 06/28/25 0259    HYDROcodone-acetaminophen (Norco) 5-325 MG per tab 1 tablet  1 tablet Oral Q4H PRN Veronica Yung MD   1 tablet at 06/27/25 1717    Or    HYDROcodone-acetaminophen (Norco) 5-325 MG per tab 2 tablet  2 tablet Oral Q4H PRN Veronica Yung MD        ondansetron (Zofran) 4 MG/2ML injection 4 mg  4 mg Intravenous Q6H PRN Najjar, Samer F, MD        metoclopramide (Reglan) 5 mg/mL injection 5 mg  5 mg Intravenous Q8H PRN Najjar, Samer F, MD        enoxaparin (Lovenox) 40 MG/0.4ML SUBQ injection 40 mg  40 mg Subcutaneous Nightly Najjar, Samer F, MD   40 mg at 06/27/25 2022    insulin aspart (NovoLOG) 100 Units/mL FlexPen 1-7 Units  1-7 Units Subcutaneous TID  CC and HS Veronica Yung MD        ipratropium-albuterol (Duoneb) 0.5-2.5 (3) MG/3ML inhalation solution 3 mL  3 mL Nebulization Q6H PRN Veronica Yung MD   3 mL at 06/28/25 1105    acetaminophen (Tylenol) tab 650 mg  650 mg Oral Q6H PRN Veronica Yung MD        HYDROmorphone (Dilaudid) 1 MG/ML injection 0.1 mg  0.1 mg Intravenous Q2H PRN Green, Charlee A, APRN        Or    HYDROmorphone (Dilaudid) 1 MG/ML injection 0.2 mg  0.2 mg Intravenous Q2H PRN Green, Charlee A, APRN   0.2 mg at 06/28/25 0828    Or    HYDROmorphone (Dilaudid) 1 MG/ML injection 0.4 mg  0.4 mg Intravenous Q2H PRN Green, Charlee A, APRN   0.4 mg at 06/27/25 2154    nicotine (Nicoderm CQ) 21 MG/24HR patch 1 patch  1 patch Transdermal Daily Veronica Yung MD   1 patch at 06/28/25 0833    docusate sodium (Colace) cap 100 mg  100 mg Oral BID Najjar, Samer F, MD   100 mg at 06/28/25 0832    polyethylene glycol (PEG 3350) (Miralax) 17 g oral packet 17 g  17 g Oral Daily PRN Najjar, Samer F, MD        magnesium hydroxide (Milk of Magnesia) 400 MG/5ML oral suspension 30 mL  30 mL Oral Daily PRN Najjar, Samer F, MD        bisacodyl (Dulcolax) 10 MG rectal suppository 10 mg  10 mg Rectal Daily PRN Najjar, Samer F, MD        fleet enema (Fleet) rectal enema 133 mL  1 enema Rectal Once PRN Najjar, Samer F, MD         No current Epic-ordered outpatient medications on file.   [5]   Allergies  Allergen Reactions    Atorvastatin OTHER (SEE COMMENTS)     Muscle aches    Nickel RASH   [6]   Family History  Problem Relation Age of Onset    Skin cancer Mother     Depression Mother     Diabetes Mother     Cancer Father     Suicide History Father     Skin cancer Sister     Depression Brother     Cataracts Brother     No Known Problems Maternal Grandmother     Kidney Disease Maternal Grandfather     No Known Problems Paternal Grandmother     No Known Problems Paternal Grandfather     Breast Cancer Neg     Colon Cancer Neg    [7]   Social History  Socioeconomic  History    Marital status:    Tobacco Use    Smoking status: Every Day     Current packs/day: 1.50     Average packs/day: 1 pack/day for 59.5 years (62.2 ttl pk-yrs)     Types: Cigarettes     Start date: 1966    Smokeless tobacco: Never    Tobacco comments:     Patient smokes x 66 years   Vaping Use    Vaping status: Some Days    Substances: Nicotine   Substance and Sexual Activity    Alcohol use: Yes     Comment: socially    Drug use: Never    Sexual activity: Not Currently     Partners: Male

## 2025-06-28 NOTE — RESPIRATORY THERAPY NOTE
Patient is off from Unit , IS is on the room, family at the bed side , RT will continuing monitor this patient.

## 2025-06-28 NOTE — ANESTHESIA POSTPROCEDURE EVALUATION
Patient: Mandi Moran    Procedure Summary       Date: 06/28/25 Room / Location: [facility] MAIN OR 06 / [facility] MAIN OR    Anesthesia Start: 1236 Anesthesia Stop:     Procedure: LEFT LEG FASCIOTOMY, RIGHT LEG popiteal THROMBECTOMY (Bilateral: Lower Leg) Diagnosis:       Compartment syndrome      (Compartment syndrome [T79.A0XA])    Surgeons: Najjar, Samer F, MD Anesthesiologist: Remigio Ortiz MD    Anesthesia Type: general ASA Status: 3 - Emergent            Anesthesia Type: general    Vitals Value Taken Time   /102 06/28/25 15:08   Temp 98 °F (36.7 °C) 06/28/25 14:50   Pulse 91 06/28/25 15:13   Resp 12 06/28/25 15:13   SpO2 95 % 06/28/25 15:13   Vitals shown include unfiled device data.    [facility] AN Post Evaluation:   Patient Evaluated in PACU  Patient Participation: complete - patient participated  Level of Consciousness: awake and alert  Pain Score: 0  Pain Management: adequate  Airway Patency:patent  Dental exam unchanged from preop  Yes    Nausea/Vomiting: none  Cardiovascular Status: stable  Respiratory Status: room air  Postoperative Hydration stable      REMIGIO ORTIZ MD  6/28/2025 3:14 PM

## 2025-06-28 NOTE — PROGRESS NOTES
[facility]  Vascular Surgery Progress Note    Mandi Moran Patient Status:  Inpatient    1955 MRN [id number]   Location [facility] 2W/SW Attending Amira Kitchen *   Hosp Day # 1 PCP Johnny Westfall MD     Objective:   Temp: 97.4 °F (36.3 °C)  Pulse: 98  Resp: 26  BP: 128/64  AO: 99/45    Intake/Output:     Intake/Output Summary (Last 24 hours) at 2025 1018  Last data filed at 2025 0843  Gross per 24 hour   Intake 1393.33 ml   Output 3350 ml   Net -1956.67 ml       Events Yesterday/Overnight:  The patient underwent an urgent initially an uneventful EVAR but before the case was finished, she was noted to have an absent palpable pulse in her left foot, and while she was still intubated, she underwent a cutdown on the left common femoral artery and endarterectomy and a thrombectomy was performed.  She regained her left femoral pulse.  Few hours later, she noted some numbness in her right foot even though the signals were significantly stronger.  She does have a history of neuropathy.  She never had a palpable pulse in that leg and therefore a CTA was obtained that revealed no thrombus in the common femoral, profunda or superficial femoral artery but there was a focal thrombus in the above-knee popliteal artery.  At that time, the patient was feeling that her right foot was getting better and therefore it was unclear if this was acute or chronic given the lack of palpable pulse on admission.  I did compare the the CTA from yesterday and the one she had done back in May and this thrombus was not present therefore I feel that this is likely secondary to the procedure yesterday.  The patient still has intact motor function but she still has some numbness in her right foot.  This morning, the patient started complaining of significant pain in her left calf.  She stated that she could barely walk on it.  She maintained having a very warm left foot signals.    Exam:  The groin  incisions are dry.  The left calf is tender to mild palpation but is not rigid.  It is firm.  She has significant pain on passive flexion.  She still has an intact motor or sensory function but with diminished motor function secondary to pain.  The right foot is warm and well-perfused with an acceptable posterior tibial signal but she still has some numbness.    Impression/Plan:  I explained to the patient and her sister that she has developed an early compartment syndrome of the left lower extremity.  The tenderness in the calf at both the medial and lateral compartments as well as the pain with passive flexion very suggestive of that.  This is somewhat unusual to develop this late reperfusion injury given that the ischemia time during her surgery was about an hour or so and the revascularization was immediate.  Irrespective, I think that she would benefit from urgent takeback to the operating room for a medial and lateral compartment release on the left side and a thrombectomy of the right popliteal artery.  Even though her right foot is doing well overall but the numbness I think is secondary to decreased perfusion.  I think delaying her fasciotomy could result in injury to the peroneal nerve which can possibly lead to a foot drop damage to the muscles with significant complications if not addressed immediately.  The patient did have breakfast at 7 and she developed her symptoms at around 8 but we still have to proceed as this is now urgent.    Medications:  Current Hospital Medications[1]    Laboratory/Data:    LABS:  Recent Labs   Lab 06/28/25  0510   WBC 10.6   HGB 9.6*   MCV 87.3   .0       Recent Labs   Lab 06/28/25  0510      K 4.4   *   CO2 25.0   BUN 20   CREATSERUM 1.34*   GLU 95   CA 8.5*     No results for input(s): \"PTP\", \"INR\", \"PTT\" in the last 168 hours.  No results for input(s): \"ALT\", \"AST\", \"ALB\", \"AMYLASE\", \"LIPASE\", \"LDH\" in the last 168 hours.    Invalid input(s): \"ALPHOS\",  \"TBIL\", \"DBIL\", \"TPROT\"  No results for input(s): \"TROP\" in the last 168 hours.  No results found for: \"ANAS\", \"BETTY\", \"ANASCRN\"  No results for input(s): \"PCACT\", \"PSACT\", \"AT3ACT\", \"HIPAB\", \"PATHI\", \"STALA\", \"DRVVTRATIO\", \"DRVVT\", \"STACLOT\", \"CARIGG\", \"J8AX0ZJKAG\", \"L0TY0ZILOH\", \"RA\", \"HAVIGM\", \"HBCIGM\", \"HCVAB\", \"HBSAG\", \"HBCAB\", \"HBVDNAINTERP\", \"ANAS\", \"C3\", \"C4\" in the last 168 hours.    Samer F. Najjar, MD  Vascular Surgery  [facility]  6/28/2025  10:18 AM         [1]   Current Facility-Administered Medications:     pantoprazole (Protonix) DR tab 40 mg, 40 mg, Oral, QAM AC    cholecalciferol (Vitamin D3) tab 5,000 Units, 5,000 Units, Oral, Daily    ascorbic acid (Vitamin C) tab 500 mg, 500 mg, Oral, Daily    albuterol (Ventolin HFA) 108 (90 Base) MCG/ACT inhaler 1 puff, 1 puff, Inhalation, Q4H PRN    amLODIPine (Norvasc) tab 10 mg, 10 mg, Oral, Nightly    gabapentin (Neurontin) cap 800 mg, 800 mg, Oral, BID    fluticasone-salmeterol (Advair Diskus) 250-50 MCG/ACT inhaler 1 puff, 1 puff, Inhalation, BID    allopurinol (Zyloprim) tab 100 mg, 100 mg, Oral, BID    aspirin DR tab 81 mg, 81 mg, Oral, Daily    fenofibrate micronized (Lofibra) cap 134 mg, 134 mg, Oral, Daily with breakfast    insulin degludec (Tresiba) 100 units/mL flextouch 10 Units, 10 Units, Subcutaneous, Nightly    dextrose in lactated ringers 5% infusion, , Intravenous, Continuous    [DISCONTINUED] acetaminophen (Tylenol) tab 650 mg, 650 mg, Oral, Q4H PRN **OR** HYDROcodone-acetaminophen (Norco) 5-325 MG per tab 1 tablet, 1 tablet, Oral, Q4H PRN **OR** HYDROcodone-acetaminophen (Norco) 5-325 MG per tab 2 tablet, 2 tablet, Oral, Q4H PRN    ondansetron (Zofran) 4 MG/2ML injection 4 mg, 4 mg, Intravenous, Q6H PRN    metoclopramide (Reglan) 5 mg/mL injection 5 mg, 5 mg, Intravenous, Q8H PRN    enoxaparin (Lovenox) 40 MG/0.4ML SUBQ injection 40 mg, 40 mg, Subcutaneous, Nightly    insulin aspart (NovoLOG) 100 Units/mL FlexPen 1-7  Units, 1-7 Units, Subcutaneous, TID CC and HS    ipratropium-albuterol (Duoneb) 0.5-2.5 (3) MG/3ML inhalation solution 3 mL, 3 mL, Nebulization, Q6H PRN    acetaminophen (Tylenol) tab 650 mg, 650 mg, Oral, Q6H PRN    HYDROmorphone (Dilaudid) 1 MG/ML injection 0.1 mg, 0.1 mg, Intravenous, Q2H PRN **OR** HYDROmorphone (Dilaudid) 1 MG/ML injection 0.2 mg, 0.2 mg, Intravenous, Q2H PRN **OR** HYDROmorphone (Dilaudid) 1 MG/ML injection 0.4 mg, 0.4 mg, Intravenous, Q2H PRN    nicotine (Nicoderm CQ) 21 MG/24HR patch 1 patch, 1 patch, Transdermal, Daily    docusate sodium (Colace) cap 100 mg, 100 mg, Oral, BID    polyethylene glycol (PEG 3350) (Miralax) 17 g oral packet 17 g, 17 g, Oral, Daily PRN    magnesium hydroxide (Milk of Magnesia) 400 MG/5ML oral suspension 30 mL, 30 mL, Oral, Daily PRN    bisacodyl (Dulcolax) 10 MG rectal suppository 10 mg, 10 mg, Rectal, Daily PRN    fleet enema (Fleet) rectal enema 133 mL, 1 enema, Rectal, Once PRN

## 2025-06-28 NOTE — PLAN OF CARE
Vital signs as charted, arterial line removed per order. Blood pressure to be taken on left arm. Up with rolling chair to bathroom this morning. Post getting up, left calf very painful, swollen, hard; Dr. Najjar notified. Back to OR for fasciotomy of the left leg and popliteal thrombectomy in the right. Received from PACU, vital signs as charted. Dr. Najjar to change dressing to left leg. Carrillo remains in place, output as charted. Pain managed with PRN dilaudid. Diet advanced post op. Family at bedside throughout the day and updated on plan of care.     Problem: Patient Centered Care  Goal: Patient preferences are identified and integrated in the patient's plan of care  Description: Interventions:  - What would you like us to know as we care for you? She has a supportive family.  - Provide timely, complete, and accurate information to patient/family  - Incorporate patient and family knowledge, values, beliefs, and cultural backgrounds into the planning and delivery of care  - Encourage patient/family to participate in care and decision-making at the level they choose  - Honor patient and family perspectives and choices  Outcome: Progressing     Problem: Diabetes/Glucose Control  Goal: Glucose maintained within prescribed range  Description: INTERVENTIONS:  - Monitor Blood Glucose as ordered  - Assess for signs and symptoms of hyperglycemia and hypoglycemia  - Administer ordered medications to maintain glucose within target range  - Assess barriers to adequate nutritional intake and initiate nutrition consult as needed  - Instruct patient on self management of diabetes  Outcome: Progressing     Problem: Patient/Family Goals  Goal: Patient/Family Long Term Goal  Description: Patient's Long Term Goal: Go home    Interventions:  - Monitor site post procedure  - monitor vitals  - PT/OT  - Educate patient on post procedure protocol  - See additional Care Plan goals for specific interventions  Outcome: Progressing  Goal:  Patient/Family Short Term Goal  Description: Patient's Short Term Goal: Feel better    Interventions:   - Surgery consult  - AAA repair  - See additional Care Plan goals for specific interventions  Outcome: Progressing     Problem: PAIN - ADULT  Goal: Verbalizes/displays adequate comfort level or patient's stated pain goal  Description: INTERVENTIONS:  - Encourage pt to monitor pain and request assistance  - Assess pain using appropriate pain scale  - Administer analgesics based on type and severity of pain and evaluate response  - Implement non-pharmacological measures as appropriate and evaluate response  - Consider cultural and social influences on pain and pain management  - Manage/alleviate anxiety  - Utilize distraction and/or relaxation techniques  - Monitor for opioid side effects  - Notify MD/LIP if interventions unsuccessful or patient reports new pain  - Anticipate increased pain with activity and pre-medicate as appropriate  Outcome: Progressing     Problem: RISK FOR INFECTION - ADULT  Goal: Absence of fever/infection during anticipated neutropenic period  Description: INTERVENTIONS  - Monitor WBC  - Administer growth factors as ordered  - Implement neutropenic guidelines  Outcome: Progressing     Problem: SAFETY ADULT - FALL  Goal: Free from fall injury  Description: INTERVENTIONS:  - Assess pt frequently for physical needs  - Identify cognitive and physical deficits and behaviors that affect risk of falls.  - [place] fall precautions as indicated by assessment.  - Educate pt/family on patient safety including physical limitations  - Instruct pt to call for assistance with activity based on assessment  - Modify environment to reduce risk of injury  - Provide assistive devices as appropriate  - Consider OT/PT consult to assist with strengthening/mobility  - Encourage toileting schedule  Outcome: Progressing     Problem: DISCHARGE PLANNING  Goal: Discharge to home or other facility with appropriate  resources  Description: INTERVENTIONS:  - Identify barriers to discharge w/pt and caregiver  - Include patient/family/discharge partner in discharge planning  - Arrange for needed discharge resources and transportation as appropriate  - Identify discharge learning needs (meds, wound care, etc)  - Arrange for interpreters to assist at discharge as needed  - Consider post-discharge preferences of patient/family/discharge partner  - Complete POLST form as appropriate  - Assess patient's ability to be responsible for managing their own health  - Refer to Case Management Department for coordinating discharge planning if the patient needs post-hospital services based on physician/LIP order or complex needs related to functional status, cognitive ability or social support system  Outcome: Progressing     Problem: RESPIRATORY - ADULT  Goal: Achieves optimal ventilation and oxygenation  Description: INTERVENTIONS:  - Assess for changes in respiratory status  - Assess for changes in mentation and behavior  - Position to facilitate oxygenation and minimize respiratory effort  - Oxygen supplementation based on oxygen saturation or ABGs  - Provide Smoking Cessation handout, if applicable  - Encourage broncho-pulmonary hygiene including cough, deep breathe, Incentive Spirometry  - Assess the need for suctioning and perform as needed  - Assess and instruct to report SOB or any respiratory difficulty  - Respiratory Therapy support as indicated  - Manage/alleviate anxiety  - Monitor for signs/symptoms of CO2 retention  Outcome: Progressing     Problem: GASTROINTESTINAL - ADULT  Goal: Maintains or returns to baseline bowel function  Description: INTERVENTIONS:  - Assess bowel function  - Maintain adequate hydration with IV or PO as ordered and tolerated  - Evaluate effectiveness of GI medications  - Encourage mobilization and activity  - Obtain nutritional consult as needed  - Establish a toileting routine/schedule  - Consider  collaborating with pharmacy to review patient's medication profile  Outcome: Progressing     Problem: GENITOURINARY - ADULT  Goal: Absence of urinary retention  Description: INTERVENTIONS:  - Assess patient’s ability to void and empty bladder  - Monitor intake/output and perform bladder scan as needed  - Follow urinary retention protocol/standard of care  - Consider collaborating with pharmacy to review patient's medication profile  - Implement strategies to promote bladder emptying  Outcome: Progressing     Problem: METABOLIC/FLUID AND ELECTROLYTES - ADULT  Goal: Glucose maintained within prescribed range  Description: INTERVENTIONS:  - Monitor Blood Glucose as ordered  - Assess for signs and symptoms of hyperglycemia and hypoglycemia  - Administer ordered medications to maintain glucose within target range  - Assess barriers to adequate nutritional intake and initiate nutrition consult as needed  - Instruct patient on self management of diabetes  Outcome: Progressing     Problem: SKIN/TISSUE INTEGRITY - ADULT  Goal: Incision(s), wounds(s) or drain site(s) healing without S/S of infection  Description: INTERVENTIONS:  - Assess and document risk factors for pressure ulcer development  - Assess and document skin integrity  - Assess and document dressing/incision, wound bed, drain sites and surrounding tissue  - Implement wound care per orders  - Initiate isolation precautions as appropriate  - Initiate Pressure Ulcer prevention bundle as indicated  Outcome: Progressing     Problem: MUSCULOSKELETAL - ADULT  Goal: Return mobility to safest level of function  Description: INTERVENTIONS:  - Assess patient stability and activity tolerance for standing, transferring and ambulating w/ or w/o assistive devices  - Assist with transfers and ambulation using safe patient handling equipment as needed  - Ensure adequate protection for wounds/incisions during mobilization  - Obtain PT/OT consults as needed  - Advance activity as  appropriate  - Communicate ordered activity level and limitations with patient/family  Outcome: Progressing

## 2025-06-28 NOTE — OPERATIVE REPORT
[facility]     PATIENTS NAME: Mandi Moran  ATTENDING PHYSICIAN: Amira Kitchen *  OPERATING PHYSICIAN: Samer F Najjar, MD  CSN: [id number]     LOCATION:  OR  MRN: [id number]    YOB: 1955  ADMISSION DATE: 6/27/2025  OPERATION DATE: 6/28/2025                OPERATIVE REPORT     PRE-OPERATIVE DIAGNOSIS:    Left lower extremity compartment syndrome  Right lower extremity ischemia     POST-OPERATIVE DIAGNOSIS: Same as above.     PROCEDURE PERFORMED:   Left lower extremity 4 compartment fasciotomy and evacuation of the hematoma  Right popliteal artery thrombectomy via calf incision    ANESTHESIA: General    SURGEON: Samer F Najjar, MD    ASSISTANT: Donya    EBL: 100 ml    SPECIMENS:   ID Type Source Tests Collected by Time Destination   1 : 1. popiteal artery clot Tissue Tissue SURGICAL PATHOLOGY TISSUE Najjar, Samer F, MD 6/28/2025  1:43 PM        COMPLICATIONS: None    SUMMARY OF CASE: Small thrombus was retrieved from the right popliteal artery with excellent subsequent flow.  The left calf fasciotomy revealed healthy muscles but a moderate size hematoma in the posterior calf area.  There was no prior intervention in that area so it was likely secondary to her being on anticoagulation during her endovascular aortic aneurysm repair procedure the day prior.  I was able to fully close the lateral compartment skin while the medial compartment skin was left open.    INDICATIONS: The patient is a 70 year old female who underwent an endovascular abdominal aortic aneurysm the day before.  She required a cutdown on the left common femoral artery with thrombectomy, endarterectomy, and repair with a bovine patch.  The right lower extremity was noted to be slightly numb but she retained normal motor function.  We then obtained a CTA in the evening that revealed a focal thrombus in the right popliteal artery.  The patient's symptoms were actually improving and I did not wish to anticoagulate her  at that time because of her [place] foot and I wanted to see how you do that overnight.  The following morning, the patient was doing well until she suddenly felt severe pain in her left calf with swelling and tenderness to palpation of her compartment spaces.  She was then diagnosed with a compartment syndrome.  Initially I felt that this may be due to reperfusion even though it was unlikely given the length of ischemic time during her procedure the day before but clinically she was having a compartment syndrome.  Therefore, I recommended that we proceed to the operating room urgently for a 4 compartment fasciotomy on the left side and a thrombectomy of the right popliteal artery. The risks and benefits were discussed with the patient. The risks are that of an MI, stroke, bleeding with hematoma that may require evacuation, nerve injury resulting in temporary or permanent deficits with pain or numbness, infection, recurrent thrombosis, compartment syndrome, limb loss, need for re-intervention and death among other complications.  The patient understood and all questions were answered.    DESCRIPTION OF PROCEDURE:  The patient was placed in the supine position, and the abdomen and lower extremities were prepped and draped in the standard fashion. Perioperative antibiotics were administered. A longitudinal incision was then performed over the lateral aspect of the left calf 3 cm lateral and parallel to the tibia. The incision was deepened through the subcutaneous tissue until the fascia was identified. Skin flaps were created on either side of the incision to expose the anterior and lateral compartments. The fascia overlying the anterior compartment was then incised and the inci- presley carried along the entire length of the skin incision. The same was performed over the lateral compartment. The underlying muscles were released and appeared to not to bulge through the fascial incisions.  The muscles appeared healthy.   Attention was then turned to the medial compartment. A medial skin incision was then performed a few centimeters below the knee and extending down to 5 cm above the ankle. The incision was deepened down to the subcutaneous tissue until the fascia was identified.  The saphenous nerve was identified and protected.  The superficial fascia was then divided, releasing the superficial posterior compartment.  Immediately noted was a moderate sized hematoma posterior to the gastrocnemius muscle.  This was evacuated.  Then, the soleus muscle was incised with electrocautery until its posterior fascia was identified. The posterior fascia was then incised with the electrocautery along most of the length of the incision, freeing the deep posterior compartment. Thorough irrigation was then performed and hemostasis was secured.  Both fasciotomy incisions were then packed temporarily.  The patient was then given 80 units/kg of heparin and we turned our attention to the right leg.      A vertical skin incision incision was made below the knee 1.5 cm posterior to the medial margin of the tibia. The subcutaneous tissue and fascia were divided. The gastrocnemius muscle was retracted posteriorly. The popliteal artery was palpated and dissected free from the accompanying vein and nerve. The popliteal artery was controlled proximally and distally with Vesseloops.  A transverse incision was then made in the mid below-knee popliteal artery which was very soft.   There was poor inflow and moderate backbleeding.  The below-knee popliteal artery was injected with concentrated heparinized saline solution and then reclamped.  I then advanced a #4 Jerald catheter through the popliteal artery in a retrograde fashion and inflated the balloon and pulled the gently was able to retrieve a small thrombus after which the inflow was excellent.  This was repeated until all of the thrombus was removed.  The popliteal artery was then injected with  concentrated heparinized saline solution and then reclamped and was closed using 6-0 Prolene sutures in a running fashion.   The artery was back-bled and flushed prior to completing the repair. The clamps were removed. Doppler was again used to insonate the PT and DP arteries at the ankle. There were biphasic signals at this time.  The popliteal incision was then closed with 2 layers of 2-0 and 3-0 Vicryl sutures followed by a running 4-0 Vicryl suture in a subcuticular fashion.  Dermabond was applied followed by a gauze and Tegaderm.    We then turned our attention back to the left leg.  Given that the muscles did not bulge at the lateral fasciotomy incision, we proceeded with approximation of the skin with interrupted 2-0 nylon sutures.  The medial incision muscles appeared to bulge slightly and therefore it was approximated along the edges with 2 interrupted 2-0 nylon sutures while in the central portion was left open with placement of 4 interrupted nylon sutures to be tied at a later time point.  The muscle was covered with an Adaptic followed by 4 x 4 Kerlix and an Ace bandage.  The patient maintained having an excellent palpable left dorsalis pedis pulse.  The incision areas were injected with a 0.5% Marcaine solution followed by application of a dry sterile dressing and a Tegaderm. The patient tolerated the procedure well was extubated and transported to the postanesthesia care unit in stable condition. The patient had Doppler signals in the foot on arrival to PACU.

## 2025-06-29 PROBLEM — I74.3 EMBOLISM AND THROMBOSIS OF ARTERIES OF LOWER EXTREMITY (HCC): Status: ACTIVE | Noted: 2025-06-29

## 2025-06-29 PROBLEM — M79.A22 NONTRAUMATIC COMPARTMENT SYNDROME OF LEFT LOWER EXTREMITY: Status: ACTIVE | Noted: 2025-06-29

## 2025-06-29 LAB
ANION GAP SERPL CALC-SCNC: 4 MMOL/L (ref 0–18)
ATRIAL RATE: 79 BPM
BASOPHILS # BLD AUTO: 0.02 X10(3) UL (ref 0–0.2)
BASOPHILS NFR BLD AUTO: 0.2 %
BUN BLD-MCNC: 16 MG/DL (ref 9–23)
BUN/CREAT SERPL: 12.5 (ref 10–20)
CALCIUM BLD-MCNC: 8.4 MG/DL (ref 8.7–10.4)
CHLORIDE SERPL-SCNC: 109 MMOL/L (ref 98–112)
CO2 SERPL-SCNC: 27 MMOL/L (ref 21–32)
CREAT BLD-MCNC: 1.28 MG/DL (ref 0.55–1.02)
DEPRECATED RDW RBC AUTO: 51.9 FL (ref 35.1–46.3)
EGFRCR SERPLBLD CKD-EPI 2021: 45 ML/MIN/1.73M2 (ref 60–?)
EOSINOPHIL # BLD AUTO: 0.3 X10(3) UL (ref 0–0.7)
EOSINOPHIL NFR BLD AUTO: 3.2 %
ERYTHROCYTE [DISTWIDTH] IN BLOOD BY AUTOMATED COUNT: 15.5 % (ref 11–15)
GLUCOSE BLD-MCNC: 152 MG/DL (ref 70–99)
GLUCOSE BLDC GLUCOMTR-MCNC: 145 MG/DL (ref 70–99)
GLUCOSE BLDC GLUCOMTR-MCNC: 147 MG/DL (ref 70–99)
GLUCOSE BLDC GLUCOMTR-MCNC: 161 MG/DL (ref 70–99)
GLUCOSE BLDC GLUCOMTR-MCNC: 185 MG/DL (ref 70–99)
HCT VFR BLD AUTO: 22.6 % (ref 35–48)
HGB BLD-MCNC: 7.4 G/DL (ref 12–16)
IMM GRANULOCYTES # BLD AUTO: 0.04 X10(3) UL (ref 0–1)
IMM GRANULOCYTES NFR BLD: 0.4 %
LYMPHOCYTES # BLD AUTO: 1.45 X10(3) UL (ref 1–4)
LYMPHOCYTES NFR BLD AUTO: 15.5 %
MCH RBC QN AUTO: 30.1 PG (ref 26–34)
MCHC RBC AUTO-ENTMCNC: 32.7 G/DL (ref 31–37)
MCV RBC AUTO: 91.9 FL (ref 80–100)
MONOCYTES # BLD AUTO: 0.76 X10(3) UL (ref 0.1–1)
MONOCYTES NFR BLD AUTO: 8.1 %
NEUTROPHILS # BLD AUTO: 6.78 X10 (3) UL (ref 1.5–7.7)
NEUTROPHILS # BLD AUTO: 6.78 X10(3) UL (ref 1.5–7.7)
NEUTROPHILS NFR BLD AUTO: 72.6 %
OSMOLALITY SERPL CALC.SUM OF ELEC: 294 MOSM/KG (ref 275–295)
P AXIS: 71 DEGREES
P-R INTERVAL: 156 MS
PLATELET # BLD AUTO: 169 10(3)UL (ref 150–450)
POTASSIUM SERPL-SCNC: 4.3 MMOL/L (ref 3.5–5.1)
Q-T INTERVAL: 364 MS
QRS DURATION: 70 MS
QTC CALCULATION (BEZET): 417 MS
R AXIS: 24 DEGREES
RBC # BLD AUTO: 2.46 X10(6)UL (ref 3.8–5.3)
SODIUM SERPL-SCNC: 140 MMOL/L (ref 136–145)
T AXIS: 77 DEGREES
VENTRICULAR RATE: 79 BPM
WBC # BLD AUTO: 9.4 X10(3) UL (ref 4–11)

## 2025-06-29 PROCEDURE — 99233 SBSQ HOSP IP/OBS HIGH 50: CPT | Performed by: HOSPITALIST

## 2025-06-29 RX ORDER — FUROSEMIDE 10 MG/ML
20 INJECTION INTRAMUSCULAR; INTRAVENOUS ONCE
Status: COMPLETED | OUTPATIENT
Start: 2025-06-29 | End: 2025-06-29

## 2025-06-29 RX ADMIN — INSULIN DEGLUDEC 10 UNITS: 100 INJECTION, SOLUTION SUBCUTANEOUS at 21:02:00

## 2025-06-29 RX ADMIN — FLUTICASONE PROPIONATE AND SALMETEROL 1 PUFF: 250; 50 POWDER RESPIRATORY (INHALATION) at 09:47:00

## 2025-06-29 RX ADMIN — DEXTROSE, SODIUM CHLORIDE, SODIUM LACTATE, POTASSIUM CHLORIDE, AND CALCIUM CHLORIDE: 5; .6; .31; .03; .02 INJECTION, SOLUTION INTRAVENOUS at 03:24:00

## 2025-06-29 RX ADMIN — ACETAMINOPHEN 650 MG: 325 TABLET ORAL at 15:58:00

## 2025-06-29 RX ADMIN — HYDROMORPHONE HYDROCHLORIDE 0.4 MG: 1 INJECTION, SOLUTION INTRAMUSCULAR; INTRAVENOUS; SUBCUTANEOUS at 03:18:00

## 2025-06-29 RX ADMIN — HYDROMORPHONE HYDROCHLORIDE 0.2 MG: 1 INJECTION, SOLUTION INTRAMUSCULAR; INTRAVENOUS; SUBCUTANEOUS at 13:07:00

## 2025-06-29 RX ADMIN — FLUTICASONE PROPIONATE AND SALMETEROL 1 PUFF: 250; 50 POWDER RESPIRATORY (INHALATION) at 20:51:00

## 2025-06-29 RX ADMIN — DOCUSATE SODIUM 100 MG: 100 CAPSULE, LIQUID FILLED ORAL at 20:51:00

## 2025-06-29 RX ADMIN — ENOXAPARIN SODIUM 40 MG: 100 INJECTION SUBCUTANEOUS at 20:51:00

## 2025-06-29 RX ADMIN — HYDROMORPHONE HYDROCHLORIDE 0.2 MG: 1 INJECTION, SOLUTION INTRAMUSCULAR; INTRAVENOUS; SUBCUTANEOUS at 08:54:00

## 2025-06-29 RX ADMIN — HYDROMORPHONE HYDROCHLORIDE 0.2 MG: 1 INJECTION, SOLUTION INTRAMUSCULAR; INTRAVENOUS; SUBCUTANEOUS at 11:12:00

## 2025-06-29 RX ADMIN — AMLODIPINE BESYLATE 10 MG: 10 TABLET ORAL at 20:51:00

## 2025-06-29 RX ADMIN — HYDROMORPHONE HYDROCHLORIDE 0.2 MG: 1 INJECTION, SOLUTION INTRAMUSCULAR; INTRAVENOUS; SUBCUTANEOUS at 23:38:00

## 2025-06-29 RX ADMIN — GABAPENTIN 800 MG: 400 CAPSULE ORAL at 08:22:00

## 2025-06-29 RX ADMIN — ALLOPURINOL 100 MG: 100 TABLET ORAL at 20:51:00

## 2025-06-29 RX ADMIN — DOCUSATE SODIUM 100 MG: 100 CAPSULE, LIQUID FILLED ORAL at 08:22:00

## 2025-06-29 RX ADMIN — ASCORBIC ACID 500 MG: 500 MG TABLET ORAL at 08:22:00

## 2025-06-29 RX ADMIN — ASPIRIN 81 MG: 81 TABLET ORAL at 08:22:00

## 2025-06-29 RX ADMIN — FUROSEMIDE 20 MG: 10 INJECTION INTRAMUSCULAR; INTRAVENOUS at 12:28:00

## 2025-06-29 RX ADMIN — GABAPENTIN 800 MG: 400 CAPSULE ORAL at 20:51:00

## 2025-06-29 RX ADMIN — NICOTINE 1 PATCH: 21 MG/24HR PATCH, TRANSDERMAL 24 HOURS TRANSDERMAL at 08:26:00

## 2025-06-29 RX ADMIN — ALLOPURINOL 100 MG: 100 TABLET ORAL at 08:22:00

## 2025-06-29 RX ADMIN — FENOFIBRATE 134 MG: 134 CAPSULE ORAL at 08:22:00

## 2025-06-29 RX ADMIN — HYDROMORPHONE HYDROCHLORIDE 0.2 MG: 1 INJECTION, SOLUTION INTRAMUSCULAR; INTRAVENOUS; SUBCUTANEOUS at 15:49:00

## 2025-06-29 RX ADMIN — PANTOPRAZOLE SODIUM 40 MG: 40 TABLET, DELAYED RELEASE ORAL at 05:13:00

## 2025-06-29 NOTE — PROGRESS NOTES
[facility]  Vascular Surgery Progress Note    Mandi Moran Patient Status:  Inpatient    1955 MRN [id number]   Location [facility] 2W/SW Attending Amira Kitchen *   Hosp Day # 2 PCP Johnny Westfall MD     Objective:   Temp: 99.4 °F (37.4 °C)  Pulse: 112  Resp: 20  BP: 134/60    Intake/Output:     Intake/Output Summary (Last 24 hours) at 2025 1201  Last data filed at 2025 0434  Gross per 24 hour   Intake 2321 ml   Output 1825 ml   Net 496 ml       Events Yesterday/Overnight:  Stable overnight.  Reports significantly improved pain in both legs with return of sensation to her right foot.  Her hemoglobin is 7.4 today.  Creatinine is 1.2.    Exam:  The left groin incision and the right groin access site and the right popliteal incision dressing are dry.  I changed the fasciotomy site and the muscle looks healthy and there is less bulging.  The lateral compartment is soft.  She has excellent motor function in the left foot with intact sensation.  She still has a palpable left dorsalis pedis pulse.  On the right, the foot is very warm with a palpable posterior tibial pulse that is weak.  The signals are excellent in both feet.    Impression/Plan:  The patient is doing well.  We will Hep-Lock her IV and give her some Lasix and keep her left leg elevated in order to minimize the edema.  Will discontinue the Carrillo.  I am anticipating that we should be able to close the medial fasciotomy wounds either tomorrow or maximum Tuesday morning.  I would like her to sit in a chair.  She will some pain  in her left leg when walking   because of the fasciotomy butt his will improve over time.    Medications:  Current Hospital Medications[1]    Laboratory/Data:    LABS:  Recent Labs   Lab 25  0510 25  0400   WBC 10.6 9.4   HGB 9.6* 7.4*   MCV 87.3 91.9   .0 169.0       Recent Labs   Lab 25  0510 25  0400    140   K 4.4 4.3   * 109   CO2 25.0 27.0    BUN 20 16   CREATSERUM 1.34* 1.28*   GLU 95 152*   CA 8.5* 8.4*     No results for input(s): \"PTP\", \"INR\", \"PTT\" in the last 168 hours.  No results for input(s): \"ALT\", \"AST\", \"ALB\", \"AMYLASE\", \"LIPASE\", \"LDH\" in the last 168 hours.    Invalid input(s): \"ALPHOS\", \"TBIL\", \"DBIL\", \"TPROT\"  No results for input(s): \"TROP\" in the last 168 hours.  No results found for: \"ANAS\", \"BETTY\", \"ANASCRN\"  No results for input(s): \"PCACT\", \"PSACT\", \"AT3ACT\", \"HIPAB\", \"PATHI\", \"STALA\", \"DRVVTRATIO\", \"DRVVT\", \"STACLOT\", \"CARIGG\", \"[id number]\", \"[id number]\", \"RA\", \"HAVIGM\", \"HBCIGM\", \"HCVAB\", \"HBSAG\", \"HBCAB\", \"HBVDNAINTERP\", \"ANAS\", \"C3\", \"C4\" in the last 168 hours.    Samer F. Najjar, MD  Vascular Surgery  [facility]  6/29/2025  12:01 PM         [1]   Current Facility-Administered Medications:     pantoprazole (Protonix) DR tab 40 mg, 40 mg, Oral, QAM AC    cholecalciferol (Vitamin D3) tab 5,000 Units, 5,000 Units, Oral, Daily    ascorbic acid (Vitamin C) tab 500 mg, 500 mg, Oral, Daily    albuterol (Ventolin HFA) 108 (90 Base) MCG/ACT inhaler 1 puff, 1 puff, Inhalation, Q4H PRN    amLODIPine (Norvasc) tab 10 mg, 10 mg, Oral, Nightly    gabapentin (Neurontin) cap 800 mg, 800 mg, Oral, BID    fluticasone-salmeterol (Advair Diskus) 250-50 MCG/ACT inhaler 1 puff, 1 puff, Inhalation, BID    allopurinol (Zyloprim) tab 100 mg, 100 mg, Oral, BID    aspirin DR tab 81 mg, 81 mg, Oral, Daily    fenofibrate micronized (Lofibra) cap 134 mg, 134 mg, Oral, Daily with breakfast    insulin degludec (Tresiba) 100 units/mL flextouch 10 Units, 10 Units, Subcutaneous, Nightly    dextrose in lactated ringers 5% infusion, , Intravenous, Continuous    [DISCONTINUED] acetaminophen (Tylenol) tab 650 mg, 650 mg, Oral, Q4H PRN **OR** HYDROcodone-acetaminophen (Norco) 5-325 MG per tab 1 tablet, 1 tablet, Oral, Q4H PRN **OR** HYDROcodone-acetaminophen (Norco) 5-325 MG per tab 2 tablet, 2 tablet, Oral, Q4H PRN    ondansetron (Zofran) 4 MG/2ML  injection 4 mg, 4 mg, Intravenous, Q6H PRN    metoclopramide (Reglan) 5 mg/mL injection 5 mg, 5 mg, Intravenous, Q8H PRN    enoxaparin (Lovenox) 40 MG/0.4ML SUBQ injection 40 mg, 40 mg, Subcutaneous, Nightly    insulin aspart (NovoLOG) 100 Units/mL FlexPen 1-7 Units, 1-7 Units, Subcutaneous, TID CC and HS    ipratropium-albuterol (Duoneb) 0.5-2.5 (3) MG/3ML inhalation solution 3 mL, 3 mL, Nebulization, Q6H PRN    acetaminophen (Tylenol) tab 650 mg, 650 mg, Oral, Q6H PRN    HYDROmorphone (Dilaudid) 1 MG/ML injection 0.1 mg, 0.1 mg, Intravenous, Q2H PRN **OR** HYDROmorphone (Dilaudid) 1 MG/ML injection 0.2 mg, 0.2 mg, Intravenous, Q2H PRN **OR** HYDROmorphone (Dilaudid) 1 MG/ML injection 0.4 mg, 0.4 mg, Intravenous, Q2H PRN    nicotine (Nicoderm CQ) 21 MG/24HR patch 1 patch, 1 patch, Transdermal, Daily    docusate sodium (Colace) cap 100 mg, 100 mg, Oral, BID    polyethylene glycol (PEG 3350) (Miralax) 17 g oral packet 17 g, 17 g, Oral, Daily PRN    magnesium hydroxide (Milk of Magnesia) 400 MG/5ML oral suspension 30 mL, 30 mL, Oral, Daily PRN    bisacodyl (Dulcolax) 10 MG rectal suppository 10 mg, 10 mg, Rectal, Daily PRN    fleet enema (Fleet) rectal enema 133 mL, 1 enema, Rectal, Once PRN

## 2025-06-29 NOTE — PROGRESS NOTES
[facility]  part of [facility]    Progress Note    Mandi Moran Patient Status:  Inpatient    1955 MRN [id number]   Location [facility] OPERATING ROOM Attending Amira Kitchen *   Hosp Day # 2 PCP Johnny Westfall MD     Chief complaint pad     Subjective:   Mandi Moran is a(n) 70 year old female pt seen this am. Pain controlled with medications.     ROS:   No cp, sob   No c/d   No n/v     Objective:   Blood pressure 134/60, pulse 112, temperature 99.4 °F (37.4 °C), temperature source Temporal, resp. rate 20, height 5' 5\" (1.651 m), weight 175 lb 14.8 oz (79.8 kg), SpO2 95%.      Intake/Output Summary (Last 24 hours) at 2025 1136  Last data filed at 2025 0434  Gross per 24 hour   Intake 2321 ml   Output 1825 ml   Net 496 ml       Patient Weight(s) for the past 336 hrs:   Weight   25 0434 175 lb 14.8 oz (79.8 kg)   25 1112 178 lb 9.2 oz (81 kg)   25 0800 187 lb 9.6 oz (85.1 kg)   25 1401 181 lb (82.1 kg)           General appearance: alert, appears stated age and cooperative  Pulmonary:  clear to auscultation bilaterally  Cardiovascular: S1, S2 normal, no murmur, click, rub or gallop, regular rate and rhythm  Abdominal: soft, non-tender; bowel sounds normal; no masses,  no organomegaly  Extremities: left leg bandage in place c/d/I, right leg - no c/c/e           Medicines:   Current Hospital Medications[1]            Blood Sugar Medications            insulin glargine (LANTUS SOLOSTAR) 100 UNIT/ML Subcutaneous Solution Pen-injector    metFORMIN HCl 1000 MG Oral Tab            Blood Pressure and Cardiac Medications            amLODIPine 10 MG Oral Tab            Medication Infusions[2]        Lab Results   Component Value Date    WBC 9.4 2025    HGB 7.4 (L) 2025    HCT 22.6 (L) 2025    .0 2025    CREATSERUM 1.28 (H) 2025    BUN 16 2025     2025    K 4.3 2025      06/29/2025    CO2 27.0 06/29/2025     (H) 06/29/2025    CA 8.4 (L) 06/29/2025    ALB 4.7 12/03/2024    ALKPHO 60 12/03/2024    BILT 0.3 12/03/2024    TP 7.7 12/03/2024    AST 22 12/03/2024    ALT 18 12/03/2024       XR CHEST AP PORTABLE  (CPT=71045)  Result Date: 6/28/2025  CONCLUSION: No acute cardiopulmonary abnormality. Electronically Verified and Signed by Attending Radiologist: Rickey Zacarias MD 6/28/2025 7:35 AM Workstation: [id number]    CTA ABDOMEN/PELVIS LOWER EXT BILAT W RUNOFF (YQM=51295)  Result Date: 6/27/2025  CONCLUSION: 1.  Patent aortobiiliac stent graft for repair of abdominal aortic aneurysm. No endoleak. 2.  Right popliteal artery occlusion. 3.  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 4.  Moderate sized right groin hematoma extending laterally. Electronically Verified and Signed by Attending Radiologist: Miguel Pace MD 6/27/2025 8:09 PM Workstation: [id number]    EKG 12 Lead  Result Date: 6/29/2025  Normal sinus rhythm Normal ECG When compared with ECG of 20-JUN-2025 14:59, Criteria for Anterior infarct are no longer Present Criteria for Inferior infarct are no longer Present      Results:     CBC:    Lab Results   Component Value Date    WBC 9.4 06/29/2025    WBC 10.6 06/28/2025    WBC 10.3 06/20/2025     Lab Results   Component Value Date    HGB 7.4 (L) 06/29/2025    HGB 9.6 (L) 06/28/2025    HGB 13.2 06/20/2025      Lab Results   Component Value Date    .0 06/29/2025    .0 06/28/2025    .0 06/20/2025       Recent Labs   Lab 06/28/25  0510 06/29/25  0400   GLU 95 152*   BUN 20 16   CREATSERUM 1.34* 1.28*   CA 8.5* 8.4*    140   K 4.4 4.3   * 109   CO2 25.0 27.0           Assessment and Plan:         ASSESSMENT AND PLAN:    1.       Abdominal aortic aneurysm, status post endovascular graft repair.    Now with compartment syndrome of left leg and thrombus in popliteal artery of right foot plan for OR now per Dr Najjar for fasciotomy  and thrombectomy  Sp fasciotomy and thrombectomy pod #1  Pain control.  Monitor hemodynamic status via left radial arterial line.  DVT prophylaxis.  2.       Peripheral arterial disease.as above   3.       Chronic obstructive pulmonary disease.  Continue home medication and monitor. Duonebs. Nicotine patch.   4.       Gout.  Continue home medication. allopurinol  5.       Hyperlipidemia.  Continue home medication.  cont fenofibrate   6.       Essential hypertension.  Continue home medication and monitor.   Amlodipine   7.       Chronic kidney disease stage 3.  Continue to monitor postoperatively.  Creat 1.2 near baseline   8.       Diabetes mellitus type 2.  Continue home medications.  Monitor Accu-Cheks.  Sliding scale insulin.  Cont iss and tresiba 10             Amira Villalta,          Chart reviewed, including current vitals, notes, labs and imaging  Labs ordered and medications adjusted as outlined above  Coordinate care with care team/consultants  Discussed with patient results of tests, management plan as outlined above, and the need for ongoing hospitalization  D/w RN     DARIUS high            PHYSICIAN Certification of Need for Inpatient Hospitalization - Initial Certification    Patient will require inpatient services that will reasonably be expected to span two midnight's based on the clinical documentation in H+P.   Based on patients current state of illness, I anticipate that, after discharge, patient will require TBD.        Supplementary Documentation:   DVT Mechanical Prophylaxis:   SCDs,    DVT Pharmacologic Prophylaxis   Medication    enoxaparin (Lovenox) 40 MG/0.4ML SUBQ injection 40 mg         DVT Pharmacologic prophylaxis: Aspirin 81 mg      Code Status: Not on file  Carrillo: Carrillo catheter in place  Carrillo Duration (in days): 2  Central line:    NORY:         **Certification      PHYSICIAN Certification of Need for Inpatient Hospitalization - Initial Certification    Patient will require  inpatient services that will reasonably be expected to span two midnight's based on the clinical documentation in H+P.   Based on patients current state of illness, I anticipate that, after discharge, patient will require TBD.                  [1]   Current Facility-Administered Medications   Medication Dose Route Frequency    pantoprazole (Protonix) DR tab 40 mg  40 mg Oral QAM AC    cholecalciferol (Vitamin D3) tab 5,000 Units  5,000 Units Oral Daily    ascorbic acid (Vitamin C) tab 500 mg  500 mg Oral Daily    albuterol (Ventolin HFA) 108 (90 Base) MCG/ACT inhaler 1 puff  1 puff Inhalation Q4H PRN    amLODIPine (Norvasc) tab 10 mg  10 mg Oral Nightly    gabapentin (Neurontin) cap 800 mg  800 mg Oral BID    fluticasone-salmeterol (Advair Diskus) 250-50 MCG/ACT inhaler 1 puff  1 puff Inhalation BID    allopurinol (Zyloprim) tab 100 mg  100 mg Oral BID    aspirin DR tab 81 mg  81 mg Oral Daily    fenofibrate micronized (Lofibra) cap 134 mg  134 mg Oral Daily with breakfast    insulin degludec (Tresiba) 100 units/mL flextouch 10 Units  10 Units Subcutaneous Nightly    dextrose in lactated ringers 5% infusion   Intravenous Continuous    HYDROcodone-acetaminophen (Norco) 5-325 MG per tab 1 tablet  1 tablet Oral Q4H PRN    Or    HYDROcodone-acetaminophen (Norco) 5-325 MG per tab 2 tablet  2 tablet Oral Q4H PRN    ondansetron (Zofran) 4 MG/2ML injection 4 mg  4 mg Intravenous Q6H PRN    metoclopramide (Reglan) 5 mg/mL injection 5 mg  5 mg Intravenous Q8H PRN    enoxaparin (Lovenox) 40 MG/0.4ML SUBQ injection 40 mg  40 mg Subcutaneous Nightly    insulin aspart (NovoLOG) 100 Units/mL FlexPen 1-7 Units  1-7 Units Subcutaneous TID CC and HS    ipratropium-albuterol (Duoneb) 0.5-2.5 (3) MG/3ML inhalation solution 3 mL  3 mL Nebulization Q6H PRN    acetaminophen (Tylenol) tab 650 mg  650 mg Oral Q6H PRN    HYDROmorphone (Dilaudid) 1 MG/ML injection 0.1 mg  0.1 mg Intravenous Q2H PRN    Or    HYDROmorphone (Dilaudid) 1 MG/ML  injection 0.2 mg  0.2 mg Intravenous Q2H PRN    Or    HYDROmorphone (Dilaudid) 1 MG/ML injection 0.4 mg  0.4 mg Intravenous Q2H PRN    nicotine (Nicoderm CQ) 21 MG/24HR patch 1 patch  1 patch Transdermal Daily    docusate sodium (Colace) cap 100 mg  100 mg Oral BID    polyethylene glycol (PEG 3350) (Miralax) 17 g oral packet 17 g  17 g Oral Daily PRN    magnesium hydroxide (Milk of Magnesia) 400 MG/5ML oral suspension 30 mL  30 mL Oral Daily PRN    bisacodyl (Dulcolax) 10 MG rectal suppository 10 mg  10 mg Rectal Daily PRN    fleet enema (Fleet) rectal enema 133 mL  1 enema Rectal Once PRN   [2]    dextrose in lactated ringers Stopped (06/29/25 1100)

## 2025-06-29 NOTE — PLAN OF CARE
Problem: Patient Centered Care  Goal: Patient preferences are identified and integrated in the patient's plan of care  Description: Interventions:  - What would you like us to know as we care for you? She has a supportive family.  - Provide timely, complete, and accurate information to patient/family  - Incorporate patient and family knowledge, values, beliefs, and cultural backgrounds into the planning and delivery of care  - Encourage patient/family to participate in care and decision-making at the level they choose  - Honor patient and family perspectives and choices  Outcome: Progressing     Problem: Diabetes/Glucose Control  Goal: Glucose maintained within prescribed range  Description: INTERVENTIONS:  - Monitor Blood Glucose as ordered  - Assess for signs and symptoms of hyperglycemia and hypoglycemia  - Administer ordered medications to maintain glucose within target range  - Assess barriers to adequate nutritional intake and initiate nutrition consult as needed  - Instruct patient on self management of diabetes  Outcome: Progressing     Problem: Patient/Family Goals  Goal: Patient/Family Long Term Goal  Description: Patient's Long Term Goal: Go home    Interventions:  - Monitor site post procedure  - monitor vitals  - PT/OT  - Educate patient on post procedure protocol  - See additional Care Plan goals for specific interventions  Outcome: Progressing  Goal: Patient/Family Short Term Goal  Description: Patient's Short Term Goal: Feel better    Interventions:   - Surgery consult  - AAA repair  - See additional Care Plan goals for specific interventions  Outcome: Progressing     Problem: PAIN - ADULT  Goal: Verbalizes/displays adequate comfort level or patient's stated pain goal  Description: INTERVENTIONS:  - Encourage pt to monitor pain and request assistance  - Assess pain using appropriate pain scale  - Administer analgesics based on type and severity of pain and evaluate response  - Implement  non-pharmacological measures as appropriate and evaluate response  - Consider cultural and social influences on pain and pain management  - Manage/alleviate anxiety  - Utilize distraction and/or relaxation techniques  - Monitor for opioid side effects  - Notify MD/LIP if interventions unsuccessful or patient reports new pain  - Anticipate increased pain with activity and pre-medicate as appropriate  Outcome: Progressing     Problem: RISK FOR INFECTION - ADULT  Goal: Absence of fever/infection during anticipated neutropenic period  Description: INTERVENTIONS  - Monitor WBC  - Administer growth factors as ordered  - Implement neutropenic guidelines  Outcome: Progressing     Problem: SAFETY ADULT - FALL  Goal: Free from fall injury  Description: INTERVENTIONS:  - Assess pt frequently for physical needs  - Identify cognitive and physical deficits and behaviors that affect risk of falls.  - [place] fall precautions as indicated by assessment.  - Educate pt/family on patient safety including physical limitations  - Instruct pt to call for assistance with activity based on assessment  - Modify environment to reduce risk of injury  - Provide assistive devices as appropriate  - Consider OT/PT consult to assist with strengthening/mobility  - Encourage toileting schedule  Outcome: Progressing     Problem: DISCHARGE PLANNING  Goal: Discharge to home or other facility with appropriate resources  Description: INTERVENTIONS:  - Identify barriers to discharge w/pt and caregiver  - Include patient/family/discharge partner in discharge planning  - Arrange for needed discharge resources and transportation as appropriate  - Identify discharge learning needs (meds, wound care, etc)  - Arrange for interpreters to assist at discharge as needed  - Consider post-discharge preferences of patient/family/discharge partner  - Complete POLST form as appropriate  - Assess patient's ability to be responsible for managing their own health  -  Refer to Case Management Department for coordinating discharge planning if the patient needs post-hospital services based on physician/LIP order or complex needs related to functional status, cognitive ability or social support system  Outcome: Progressing     Problem: RESPIRATORY - ADULT  Goal: Achieves optimal ventilation and oxygenation  Description: INTERVENTIONS:  - Assess for changes in respiratory status  - Assess for changes in mentation and behavior  - Position to facilitate oxygenation and minimize respiratory effort  - Oxygen supplementation based on oxygen saturation or ABGs  - Provide Smoking Cessation handout, if applicable  - Encourage broncho-pulmonary hygiene including cough, deep breathe, Incentive Spirometry  - Assess the need for suctioning and perform as needed  - Assess and instruct to report SOB or any respiratory difficulty  - Respiratory Therapy support as indicated  - Manage/alleviate anxiety  - Monitor for signs/symptoms of CO2 retention  Outcome: Progressing     Problem: GASTROINTESTINAL - ADULT  Goal: Maintains or returns to baseline bowel function  Description: INTERVENTIONS:  - Assess bowel function  - Maintain adequate hydration with IV or PO as ordered and tolerated  - Evaluate effectiveness of GI medications  - Encourage mobilization and activity  - Obtain nutritional consult as needed  - Establish a toileting routine/schedule  - Consider collaborating with pharmacy to review patient's medication profile  Outcome: Progressing     Problem: GENITOURINARY - ADULT  Goal: Absence of urinary retention  Description: INTERVENTIONS:  - Assess patient’s ability to void and empty bladder  - Monitor intake/output and perform bladder scan as needed  - Follow urinary retention protocol/standard of care  - Consider collaborating with pharmacy to review patient's medication profile  - Implement strategies to promote bladder emptying  Outcome: Progressing     Problem: METABOLIC/FLUID AND  ELECTROLYTES - ADULT  Goal: Glucose maintained within prescribed range  Description: INTERVENTIONS:  - Monitor Blood Glucose as ordered  - Assess for signs and symptoms of hyperglycemia and hypoglycemia  - Administer ordered medications to maintain glucose within target range  - Assess barriers to adequate nutritional intake and initiate nutrition consult as needed  - Instruct patient on self management of diabetes  Outcome: Progressing     Problem: SKIN/TISSUE INTEGRITY - ADULT  Goal: Incision(s), wounds(s) or drain site(s) healing without S/S of infection  Description: INTERVENTIONS:  - Assess and document risk factors for pressure ulcer development  - Assess and document skin integrity  - Assess and document dressing/incision, wound bed, drain sites and surrounding tissue  - Implement wound care per orders  - Initiate isolation precautions as appropriate  - Initiate Pressure Ulcer prevention bundle as indicated  Outcome: Progressing     Problem: MUSCULOSKELETAL - ADULT  Goal: Return mobility to safest level of function  Description: INTERVENTIONS:  - Assess patient stability and activity tolerance for standing, transferring and ambulating w/ or w/o assistive devices  - Assist with transfers and ambulation using safe patient handling equipment as needed  - Ensure adequate protection for wounds/incisions during mobilization  - Obtain PT/OT consults as needed  - Advance activity as appropriate  - Communicate ordered activity level and limitations with patient/family  Outcome: Progressing

## 2025-06-29 NOTE — PLAN OF CARE
Patient's LLE dressing changed by Dr.Najjar, then changed/reinforced again by this RN due to sanguinous drainage. Dilaudid PRN for pain. Tylenol PRN for fever (see MAR). Patient up to chair, with stand/pivot, did not tolerate well. Patient put on sling to get up in bathroom. Carrillo out, purewick now in place. Call light within reach, safety measures maintained. Family at bedside.      Problem: Patient Centered Care  Goal: Patient preferences are identified and integrated in the patient's plan of care  Description: Interventions:  - What would you like us to know as we care for you? She has a supportive family.  - Provide timely, complete, and accurate information to patient/family  - Incorporate patient and family knowledge, values, beliefs, and cultural backgrounds into the planning and delivery of care  - Encourage patient/family to participate in care and decision-making at the level they choose  - Honor patient and family perspectives and choices  Outcome: Progressing     Problem: Diabetes/Glucose Control  Goal: Glucose maintained within prescribed range  Description: INTERVENTIONS:  - Monitor Blood Glucose as ordered  - Assess for signs and symptoms of hyperglycemia and hypoglycemia  - Administer ordered medications to maintain glucose within target range  - Assess barriers to adequate nutritional intake and initiate nutrition consult as needed  - Instruct patient on self management of diabetes  Outcome: Progressing     Problem: PAIN - ADULT  Goal: Verbalizes/displays adequate comfort level or patient's stated pain goal  Description: INTERVENTIONS:  - Encourage pt to monitor pain and request assistance  - Assess pain using appropriate pain scale  - Administer analgesics based on type and severity of pain and evaluate response  - Implement non-pharmacological measures as appropriate and evaluate response  - Consider cultural and social influences on pain and pain management  - Manage/alleviate anxiety  - Utilize  distraction and/or relaxation techniques  - Monitor for opioid side effects  - Notify MD/LIP if interventions unsuccessful or patient reports new pain  - Anticipate increased pain with activity and pre-medicate as appropriate  Outcome: Progressing     Problem: RISK FOR INFECTION - ADULT  Goal: Absence of fever/infection during anticipated neutropenic period  Description: INTERVENTIONS  - Monitor WBC  - Administer growth factors as ordered  - Implement neutropenic guidelines  Outcome: Progressing     Problem: SAFETY ADULT - FALL  Goal: Free from fall injury  Description: INTERVENTIONS:  - Assess pt frequently for physical needs  - Identify cognitive and physical deficits and behaviors that affect risk of falls.  - [place] fall precautions as indicated by assessment.  - Educate pt/family on patient safety including physical limitations  - Instruct pt to call for assistance with activity based on assessment  - Modify environment to reduce risk of injury  - Provide assistive devices as appropriate  - Consider OT/PT consult to assist with strengthening/mobility  - Encourage toileting schedule  Outcome: Progressing     Problem: DISCHARGE PLANNING  Goal: Discharge to home or other facility with appropriate resources  Description: INTERVENTIONS:  - Identify barriers to discharge w/pt and caregiver  - Include patient/family/discharge partner in discharge planning  - Arrange for needed discharge resources and transportation as appropriate  - Identify discharge learning needs (meds, wound care, etc)  - Arrange for interpreters to assist at discharge as needed  - Consider post-discharge preferences of patient/family/discharge partner  - Complete POLST form as appropriate  - Assess patient's ability to be responsible for managing their own health  - Refer to Case Management Department for coordinating discharge planning if the patient needs post-hospital services based on physician/LIP order or complex needs related to  functional status, cognitive ability or social support system  Outcome: Progressing     Problem: RESPIRATORY - ADULT  Goal: Achieves optimal ventilation and oxygenation  Description: INTERVENTIONS:  - Assess for changes in respiratory status  - Assess for changes in mentation and behavior  - Position to facilitate oxygenation and minimize respiratory effort  - Oxygen supplementation based on oxygen saturation or ABGs  - Provide Smoking Cessation handout, if applicable  - Encourage broncho-pulmonary hygiene including cough, deep breathe, Incentive Spirometry  - Assess the need for suctioning and perform as needed  - Assess and instruct to report SOB or any respiratory difficulty  - Respiratory Therapy support as indicated  - Manage/alleviate anxiety  - Monitor for signs/symptoms of CO2 retention  Outcome: Progressing     Problem: GASTROINTESTINAL - ADULT  Goal: Maintains or returns to baseline bowel function  Description: INTERVENTIONS:  - Assess bowel function  - Maintain adequate hydration with IV or PO as ordered and tolerated  - Evaluate effectiveness of GI medications  - Encourage mobilization and activity  - Obtain nutritional consult as needed  - Establish a toileting routine/schedule  - Consider collaborating with pharmacy to review patient's medication profile  Outcome: Progressing     Problem: GENITOURINARY - ADULT  Goal: Absence of urinary retention  Description: INTERVENTIONS:  - Assess patient’s ability to void and empty bladder  - Monitor intake/output and perform bladder scan as needed  - Follow urinary retention protocol/standard of care  - Consider collaborating with pharmacy to review patient's medication profile  - Implement strategies to promote bladder emptying  Outcome: Progressing     Problem: METABOLIC/FLUID AND ELECTROLYTES - ADULT  Goal: Glucose maintained within prescribed range  Description: INTERVENTIONS:  - Monitor Blood Glucose as ordered  - Assess for signs and symptoms of  hyperglycemia and hypoglycemia  - Administer ordered medications to maintain glucose within target range  - Assess barriers to adequate nutritional intake and initiate nutrition consult as needed  - Instruct patient on self management of diabetes  Outcome: Progressing     Problem: SKIN/TISSUE INTEGRITY - ADULT  Goal: Incision(s), wounds(s) or drain site(s) healing without S/S of infection  Description: INTERVENTIONS:  - Assess and document risk factors for pressure ulcer development  - Assess and document skin integrity  - Assess and document dressing/incision, wound bed, drain sites and surrounding tissue  - Implement wound care per orders  - Initiate isolation precautions as appropriate  - Initiate Pressure Ulcer prevention bundle as indicated  Outcome: Progressing     Problem: MUSCULOSKELETAL - ADULT  Goal: Return mobility to safest level of function  Description: INTERVENTIONS:  - Assess patient stability and activity tolerance for standing, transferring and ambulating w/ or w/o assistive devices  - Assist with transfers and ambulation using safe patient handling equipment as needed  - Ensure adequate protection for wounds/incisions during mobilization  - Obtain PT/OT consults as needed  - Advance activity as appropriate  - Communicate ordered activity level and limitations with patient/family  Outcome: Progressing      English

## 2025-06-30 LAB
ANION GAP SERPL CALC-SCNC: 4 MMOL/L (ref 0–18)
BASOPHILS # BLD AUTO: 0.02 X10(3) UL (ref 0–0.2)
BASOPHILS NFR BLD AUTO: 0.2 %
BUN BLD-MCNC: 15 MG/DL (ref 9–23)
BUN/CREAT SERPL: 12.2 (ref 10–20)
CALCIUM BLD-MCNC: 8.9 MG/DL (ref 8.7–10.4)
CHLORIDE SERPL-SCNC: 107 MMOL/L (ref 98–112)
CO2 SERPL-SCNC: 26 MMOL/L (ref 21–32)
CREAT BLD-MCNC: 1.23 MG/DL (ref 0.55–1.02)
DEPRECATED RDW RBC AUTO: 50.1 FL (ref 35.1–46.3)
EGFRCR SERPLBLD CKD-EPI 2021: 47 ML/MIN/1.73M2 (ref 60–?)
EOSINOPHIL # BLD AUTO: 0.4 X10(3) UL (ref 0–0.7)
EOSINOPHIL NFR BLD AUTO: 4 %
ERYTHROCYTE [DISTWIDTH] IN BLOOD BY AUTOMATED COUNT: 15.1 % (ref 11–15)
GLUCOSE BLD-MCNC: 140 MG/DL (ref 70–99)
GLUCOSE BLDC GLUCOMTR-MCNC: 145 MG/DL (ref 70–99)
GLUCOSE BLDC GLUCOMTR-MCNC: 145 MG/DL (ref 70–99)
GLUCOSE BLDC GLUCOMTR-MCNC: 148 MG/DL (ref 70–99)
GLUCOSE BLDC GLUCOMTR-MCNC: 160 MG/DL (ref 70–99)
HCT VFR BLD AUTO: 22.6 % (ref 35–48)
HGB BLD-MCNC: 7.2 G/DL (ref 12–16)
IMM GRANULOCYTES # BLD AUTO: 0.06 X10(3) UL (ref 0–1)
IMM GRANULOCYTES NFR BLD: 0.6 %
LYMPHOCYTES # BLD AUTO: 1.34 X10(3) UL (ref 1–4)
LYMPHOCYTES NFR BLD AUTO: 13.3 %
MCH RBC QN AUTO: 28.8 PG (ref 26–34)
MCHC RBC AUTO-ENTMCNC: 31.9 G/DL (ref 31–37)
MCV RBC AUTO: 90.4 FL (ref 80–100)
MONOCYTES # BLD AUTO: 0.85 X10(3) UL (ref 0.1–1)
MONOCYTES NFR BLD AUTO: 8.4 %
NEUTROPHILS # BLD AUTO: 7.4 X10 (3) UL (ref 1.5–7.7)
NEUTROPHILS # BLD AUTO: 7.4 X10(3) UL (ref 1.5–7.7)
NEUTROPHILS NFR BLD AUTO: 73.5 %
OSMOLALITY SERPL CALC.SUM OF ELEC: 287 MOSM/KG (ref 275–295)
PLATELET # BLD AUTO: 189 10(3)UL (ref 150–450)
POTASSIUM SERPL-SCNC: 3.8 MMOL/L (ref 3.5–5.1)
RBC # BLD AUTO: 2.5 X10(6)UL (ref 3.8–5.3)
SODIUM SERPL-SCNC: 137 MMOL/L (ref 136–145)
WBC # BLD AUTO: 10.1 X10(3) UL (ref 4–11)

## 2025-06-30 PROCEDURE — 99233 SBSQ HOSP IP/OBS HIGH 50: CPT | Performed by: HOSPITALIST

## 2025-06-30 RX ORDER — LIDOCAINE HYDROCHLORIDE 10 MG/ML
10 INJECTION, SOLUTION EPIDURAL; INFILTRATION; INTRACAUDAL; PERINEURAL ONCE
Status: COMPLETED | OUTPATIENT
Start: 2025-06-30 | End: 2025-06-30

## 2025-06-30 RX ORDER — POTASSIUM CHLORIDE 1500 MG/1
40 TABLET, EXTENDED RELEASE ORAL ONCE
Status: COMPLETED | OUTPATIENT
Start: 2025-06-30 | End: 2025-06-30

## 2025-06-30 RX ORDER — BUPIVACAINE HYDROCHLORIDE 5 MG/ML
10 INJECTION, SOLUTION EPIDURAL; INTRACAUDAL; PERINEURAL ONCE
Status: COMPLETED | OUTPATIENT
Start: 2025-06-30 | End: 2025-06-30

## 2025-06-30 RX ADMIN — HYDROMORPHONE HYDROCHLORIDE 0.2 MG: 1 INJECTION, SOLUTION INTRAMUSCULAR; INTRAVENOUS; SUBCUTANEOUS at 17:40:00

## 2025-06-30 RX ADMIN — HYDROMORPHONE HYDROCHLORIDE 0.1 MG: 1 INJECTION, SOLUTION INTRAMUSCULAR; INTRAVENOUS; SUBCUTANEOUS at 07:50:00

## 2025-06-30 RX ADMIN — HYDROMORPHONE HYDROCHLORIDE 0.2 MG: 1 INJECTION, SOLUTION INTRAMUSCULAR; INTRAVENOUS; SUBCUTANEOUS at 04:09:00

## 2025-06-30 RX ADMIN — HYDROMORPHONE HYDROCHLORIDE 0.4 MG: 1 INJECTION, SOLUTION INTRAMUSCULAR; INTRAVENOUS; SUBCUTANEOUS at 23:45:00

## 2025-06-30 RX ADMIN — HYDROCODONE BITARTRATE AND ACETAMINOPHEN 2 TABLET: 5; 325 TABLET ORAL at 22:50:00

## 2025-06-30 RX ADMIN — ASCORBIC ACID 500 MG: 500 MG TABLET ORAL at 08:19:00

## 2025-06-30 RX ADMIN — HYDROMORPHONE HYDROCHLORIDE 0.4 MG: 1 INJECTION, SOLUTION INTRAMUSCULAR; INTRAVENOUS; SUBCUTANEOUS at 21:38:00

## 2025-06-30 RX ADMIN — HYDROMORPHONE HYDROCHLORIDE 0.2 MG: 1 INJECTION, SOLUTION INTRAMUSCULAR; INTRAVENOUS; SUBCUTANEOUS at 12:19:00

## 2025-06-30 RX ADMIN — DOCUSATE SODIUM 100 MG: 100 CAPSULE, LIQUID FILLED ORAL at 21:38:00

## 2025-06-30 RX ADMIN — HYDROMORPHONE HYDROCHLORIDE 0.2 MG: 1 INJECTION, SOLUTION INTRAMUSCULAR; INTRAVENOUS; SUBCUTANEOUS at 07:30:00

## 2025-06-30 RX ADMIN — AMLODIPINE BESYLATE 10 MG: 10 TABLET ORAL at 21:37:00

## 2025-06-30 RX ADMIN — DOCUSATE SODIUM 100 MG: 100 CAPSULE, LIQUID FILLED ORAL at 08:19:00

## 2025-06-30 RX ADMIN — HYDROMORPHONE HYDROCHLORIDE 0.4 MG: 1 INJECTION, SOLUTION INTRAMUSCULAR; INTRAVENOUS; SUBCUTANEOUS at 18:56:00

## 2025-06-30 RX ADMIN — NICOTINE 1 PATCH: 21 MG/24HR PATCH, TRANSDERMAL 24 HOURS TRANSDERMAL at 08:20:00

## 2025-06-30 RX ADMIN — ALLOPURINOL 100 MG: 100 TABLET ORAL at 21:37:00

## 2025-06-30 RX ADMIN — PANTOPRAZOLE SODIUM 40 MG: 40 TABLET, DELAYED RELEASE ORAL at 08:19:00

## 2025-06-30 RX ADMIN — GABAPENTIN 800 MG: 400 CAPSULE ORAL at 21:37:00

## 2025-06-30 RX ADMIN — GABAPENTIN 800 MG: 400 CAPSULE ORAL at 08:19:00

## 2025-06-30 RX ADMIN — INSULIN DEGLUDEC 10 UNITS: 100 INJECTION, SOLUTION SUBCUTANEOUS at 21:47:00

## 2025-06-30 RX ADMIN — HYDROMORPHONE HYDROCHLORIDE 0.4 MG: 1 INJECTION, SOLUTION INTRAMUSCULAR; INTRAVENOUS; SUBCUTANEOUS at 07:42:00

## 2025-06-30 RX ADMIN — ALLOPURINOL 100 MG: 100 TABLET ORAL at 08:19:00

## 2025-06-30 RX ADMIN — FLUTICASONE PROPIONATE AND SALMETEROL 1 PUFF: 250; 50 POWDER RESPIRATORY (INHALATION) at 21:46:00

## 2025-06-30 RX ADMIN — FLUTICASONE PROPIONATE AND SALMETEROL 1 PUFF: 250; 50 POWDER RESPIRATORY (INHALATION) at 08:20:00

## 2025-06-30 RX ADMIN — FENOFIBRATE 134 MG: 134 CAPSULE ORAL at 08:19:00

## 2025-06-30 RX ADMIN — ASPIRIN 81 MG: 81 TABLET ORAL at 08:19:00

## 2025-06-30 RX ADMIN — POTASSIUM CHLORIDE 40 MEQ: 1500 TABLET, EXTENDED RELEASE ORAL at 10:55:00

## 2025-06-30 RX ADMIN — ENOXAPARIN SODIUM 40 MG: 100 INJECTION SUBCUTANEOUS at 21:38:00

## 2025-06-30 RX ADMIN — HYDROMORPHONE HYDROCHLORIDE 0.4 MG: 1 INJECTION, SOLUTION INTRAMUSCULAR; INTRAVENOUS; SUBCUTANEOUS at 15:16:00

## 2025-06-30 NOTE — PLAN OF CARE
Pt unable to void 7 hours post nixon removal, bladder scan done showing 926 mL. Pt stating they have the urge to urinate but are unable to do so and feeling the pressure in their bladder. Pt straight cathed x1 with approx 1L out, see flowsheets. Prn pain medication given, see mar. Pt updated on plan of care and all questions answered at this time.     Problem: Diabetes/Glucose Control  Goal: Glucose maintained within prescribed range  Description: INTERVENTIONS:  - Monitor Blood Glucose as ordered  - Assess for signs and symptoms of hyperglycemia and hypoglycemia  - Administer ordered medications to maintain glucose within target range  - Assess barriers to adequate nutritional intake and initiate nutrition consult as needed  - Instruct patient on self management of diabetes  Outcome: Progressing     Problem: PAIN - ADULT  Goal: Verbalizes/displays adequate comfort level or patient's stated pain goal  Description: INTERVENTIONS:  - Encourage pt to monitor pain and request assistance  - Assess pain using appropriate pain scale  - Administer analgesics based on type and severity of pain and evaluate response  - Implement non-pharmacological measures as appropriate and evaluate response  - Consider cultural and social influences on pain and pain management  - Manage/alleviate anxiety  - Utilize distraction and/or relaxation techniques  - Monitor for opioid side effects  - Notify MD/LIP if interventions unsuccessful or patient reports new pain  - Anticipate increased pain with activity and pre-medicate as appropriate  Outcome: Progressing     Problem: RISK FOR INFECTION - ADULT  Goal: Absence of fever/infection during anticipated neutropenic period  Description: INTERVENTIONS  - Monitor WBC  - Administer growth factors as ordered  - Implement neutropenic guidelines  Outcome: Progressing     Problem: SAFETY ADULT - FALL  Goal: Free from fall injury  Description: INTERVENTIONS:  - Assess pt frequently for physical  needs  - Identify cognitive and physical deficits and behaviors that affect risk of falls.  - [place] fall precautions as indicated by assessment.  - Educate pt/family on patient safety including physical limitations  - Instruct pt to call for assistance with activity based on assessment  - Modify environment to reduce risk of injury  - Provide assistive devices as appropriate  - Consider OT/PT consult to assist with strengthening/mobility  - Encourage toileting schedule  Outcome: Progressing     Problem: RESPIRATORY - ADULT  Goal: Achieves optimal ventilation and oxygenation  Description: INTERVENTIONS:  - Assess for changes in respiratory status  - Assess for changes in mentation and behavior  - Position to facilitate oxygenation and minimize respiratory effort  - Oxygen supplementation based on oxygen saturation or ABGs  - Provide Smoking Cessation handout, if applicable  - Encourage broncho-pulmonary hygiene including cough, deep breathe, Incentive Spirometry  - Assess the need for suctioning and perform as needed  - Assess and instruct to report SOB or any respiratory difficulty  - Respiratory Therapy support as indicated  - Manage/alleviate anxiety  - Monitor for signs/symptoms of CO2 retention  Outcome: Progressing     Problem: GASTROINTESTINAL - ADULT  Goal: Maintains or returns to baseline bowel function  Description: INTERVENTIONS:  - Assess bowel function  - Maintain adequate hydration with IV or PO as ordered and tolerated  - Evaluate effectiveness of GI medications  - Encourage mobilization and activity  - Obtain nutritional consult as needed  - Establish a toileting routine/schedule  - Consider collaborating with pharmacy to review patient's medication profile  Outcome: Progressing     Problem: GENITOURINARY - ADULT  Goal: Absence of urinary retention  Description: INTERVENTIONS:  - Assess patient’s ability to void and empty bladder  - Monitor intake/output and perform bladder scan as needed  -  Follow urinary retention protocol/standard of care  - Consider collaborating with pharmacy to review patient's medication profile  - Implement strategies to promote bladder emptying  Outcome: Progressing     Problem: METABOLIC/FLUID AND ELECTROLYTES - ADULT  Goal: Glucose maintained within prescribed range  Description: INTERVENTIONS:  - Monitor Blood Glucose as ordered  - Assess for signs and symptoms of hyperglycemia and hypoglycemia  - Administer ordered medications to maintain glucose within target range  - Assess barriers to adequate nutritional intake and initiate nutrition consult as needed  - Instruct patient on self management of diabetes  Outcome: Progressing     Problem: SKIN/TISSUE INTEGRITY - ADULT  Goal: Incision(s), wounds(s) or drain site(s) healing without S/S of infection  Description: INTERVENTIONS:  - Assess and document risk factors for pressure ulcer development  - Assess and document skin integrity  - Assess and document dressing/incision, wound bed, drain sites and surrounding tissue  - Implement wound care per orders  - Initiate isolation precautions as appropriate  - Initiate Pressure Ulcer prevention bundle as indicated  Outcome: Progressing     Problem: MUSCULOSKELETAL - ADULT  Goal: Return mobility to safest level of function  Description: INTERVENTIONS:  - Assess patient stability and activity tolerance for standing, transferring and ambulating w/ or w/o assistive devices  - Assist with transfers and ambulation using safe patient handling equipment as needed  - Ensure adequate protection for wounds/incisions during mobilization  - Obtain PT/OT consults as needed  - Advance activity as appropriate  - Communicate ordered activity level and limitations with patient/family  Outcome: Progressing

## 2025-06-30 NOTE — PROGRESS NOTES
[facility]  part of [facility]    Progress Note    Mandi Moran Patient Status:  Inpatient    1955 MRN [id number]   Location [facility] OPERATING ROOM Attending Amira Kitchen *   Hosp Day # 3 PCP Johnny Westfall MD     Chief complaint pad     Subjective:   Mandi Moran is a(n) 70 year old female pt seen this am. Pain controlled with medications. Had fasciotomy closed     ROS:   No cp, sob   No c/d   No n/v     Objective:   Blood pressure 129/55, pulse 92, temperature 98.2 °F (36.8 °C), temperature source Temporal, resp. rate 20, height 5' 5\" (1.651 m), weight 173 lb 4.5 oz (78.6 kg), SpO2 100%.      Intake/Output Summary (Last 24 hours) at 2025 1645  Last data filed at 2025 1400  Gross per 24 hour   Intake 480 ml   Output 1025 ml   Net -545 ml       Patient Weight(s) for the past 336 hrs:   Weight   25 1105 173 lb 4.5 oz (78.6 kg)   25 0434 175 lb 14.8 oz (79.8 kg)   25 1112 178 lb 9.2 oz (81 kg)   25 0800 187 lb 9.6 oz (85.1 kg)   25 1401 181 lb (82.1 kg)           General appearance: alert, appears stated age and cooperative  Pulmonary:  clear to auscultation bilaterally  Cardiovascular: S1, S2 normal, no murmur, click, rub or gallop, regular rate and rhythm  Abdominal: soft, non-tender; bowel sounds normal; no masses,  no organomegaly  Extremities: left leg bandage in place c/d/I, right leg - no c/c/e           Medicines:   Current Hospital Medications[1]            Blood Sugar Medications            insulin glargine (LANTUS SOLOSTAR) 100 UNIT/ML Subcutaneous Solution Pen-injector    metFORMIN HCl 1000 MG Oral Tab            Blood Pressure and Cardiac Medications            amLODIPine 10 MG Oral Tab            Medication Infusions[2]        Lab Results   Component Value Date    WBC 10.1 2025    HGB 7.2 (L) 2025    HCT 22.6 (L) 2025    .0 2025    CREATSERUM 1.23 (H) 2025    BUN 15  06/30/2025     06/30/2025    K 3.8 06/30/2025     06/30/2025    CO2 26.0 06/30/2025     (H) 06/30/2025    CA 8.9 06/30/2025    ALB 4.7 12/03/2024    ALKPHO 60 12/03/2024    BILT 0.3 12/03/2024    TP 7.7 12/03/2024    AST 22 12/03/2024    ALT 18 12/03/2024       No results found.            Results:     CBC:    Lab Results   Component Value Date    WBC 10.1 06/30/2025    WBC 9.4 06/29/2025    WBC 10.6 06/28/2025     Lab Results   Component Value Date    HGB 7.2 (L) 06/30/2025    HGB 7.4 (L) 06/29/2025    HGB 9.6 (L) 06/28/2025      Lab Results   Component Value Date    .0 06/30/2025    .0 06/29/2025    .0 06/28/2025       Recent Labs   Lab 06/28/25  0510 06/29/25  0400 06/30/25  0912   GLU 95 152* 140*   BUN 20 16 15   CREATSERUM 1.34* 1.28* 1.23*   CA 8.5* 8.4* 8.9    140 137   K 4.4 4.3 3.8   * 109 107   CO2 25.0 27.0 26.0           Assessment and Plan:         ASSESSMENT AND PLAN:    1.       Abdominal aortic aneurysm, status post endovascular graft repair.    Now with compartment syndrome of left leg and thrombus in popliteal artery of right foot plan for OR now per Dr Najjar for fasciotomy and thrombectomy  Sp fasciotomy and thrombectomy pod #2  Pain control.  Monitor hemodynamic status via left radial arterial line.  DVT prophylaxis.  2.       Peripheral arterial disease.as above   3.       Chronic obstructive pulmonary disease.  Continue home medication and monitor. Duonebs. Nicotine patch.   4.       Gout.  Continue home medication. allopurinol  5.       Hyperlipidemia.  Continue home medication.  cont fenofibrate   6.       Essential hypertension.  Continue home medication and monitor.   Amlodipine   7.       Chronic kidney disease stage 3.  Continue to monitor postoperatively.  Creat 1.2 near baseline   8.       Diabetes mellitus type 2.  Continue home medications.  Monitor Accu-Cheks.  Sliding scale insulin.  Cont iss and tresiba 10             Amira SCOTT  DO Pawan         Chart reviewed, including current vitals, notes, labs and imaging  Labs ordered and medications adjusted as outlined above  Coordinate care with care team/consultants  Discussed with patient results of tests, management plan as outlined above, and the need for ongoing hospitalization  D/w RN     MDM high            PHYSICIAN Certification of Need for Inpatient Hospitalization - Initial Certification    Patient will require inpatient services that will reasonably be expected to span two midnight's based on the clinical documentation in H+P.   Based on patients current state of illness, I anticipate that, after discharge, patient will require TBD.        Supplementary Documentation:   DVT Mechanical Prophylaxis:   SCDs,    DVT Pharmacologic Prophylaxis   Medication    enoxaparin (Lovenox) 40 MG/0.4ML SUBQ injection 40 mg         DVT Pharmacologic prophylaxis: Aspirin 81 mg      Code Status: Not on file  Carrillo: External urinary catheter in place  Carrillo Duration (in days): 2  Central line:    NORY:         **Certification      PHYSICIAN Certification of Need for Inpatient Hospitalization - Initial Certification    Patient will require inpatient services that will reasonably be expected to span two midnight's based on the clinical documentation in H+P.   Based on patients current state of illness, I anticipate that, after discharge, patient will require TBD.                  [1]   Current Facility-Administered Medications   Medication Dose Route Frequency    pantoprazole (Protonix) DR tab 40 mg  40 mg Oral QAM AC    cholecalciferol (Vitamin D3) tab 5,000 Units  5,000 Units Oral Daily    ascorbic acid (Vitamin C) tab 500 mg  500 mg Oral Daily    albuterol (Ventolin HFA) 108 (90 Base) MCG/ACT inhaler 1 puff  1 puff Inhalation Q4H PRN    amLODIPine (Norvasc) tab 10 mg  10 mg Oral Nightly    gabapentin (Neurontin) cap 800 mg  800 mg Oral BID    fluticasone-salmeterol (Advair Diskus) 250-50 MCG/ACT  inhaler 1 puff  1 puff Inhalation BID    allopurinol (Zyloprim) tab 100 mg  100 mg Oral BID    aspirin DR tab 81 mg  81 mg Oral Daily    fenofibrate micronized (Lofibra) cap 134 mg  134 mg Oral Daily with breakfast    insulin degludec (Tresiba) 100 units/mL flextouch 10 Units  10 Units Subcutaneous Nightly    dextrose in lactated ringers 5% infusion   Intravenous Continuous    HYDROcodone-acetaminophen (Norco) 5-325 MG per tab 1 tablet  1 tablet Oral Q4H PRN    Or    HYDROcodone-acetaminophen (Norco) 5-325 MG per tab 2 tablet  2 tablet Oral Q4H PRN    ondansetron (Zofran) 4 MG/2ML injection 4 mg  4 mg Intravenous Q6H PRN    metoclopramide (Reglan) 5 mg/mL injection 5 mg  5 mg Intravenous Q8H PRN    enoxaparin (Lovenox) 40 MG/0.4ML SUBQ injection 40 mg  40 mg Subcutaneous Nightly    insulin aspart (NovoLOG) 100 Units/mL FlexPen 1-7 Units  1-7 Units Subcutaneous TID CC and HS    ipratropium-albuterol (Duoneb) 0.5-2.5 (3) MG/3ML inhalation solution 3 mL  3 mL Nebulization Q6H PRN    acetaminophen (Tylenol) tab 650 mg  650 mg Oral Q6H PRN    HYDROmorphone (Dilaudid) 1 MG/ML injection 0.1 mg  0.1 mg Intravenous Q2H PRN    Or    HYDROmorphone (Dilaudid) 1 MG/ML injection 0.2 mg  0.2 mg Intravenous Q2H PRN    Or    HYDROmorphone (Dilaudid) 1 MG/ML injection 0.4 mg  0.4 mg Intravenous Q2H PRN    nicotine (Nicoderm CQ) 21 MG/24HR patch 1 patch  1 patch Transdermal Daily    docusate sodium (Colace) cap 100 mg  100 mg Oral BID    polyethylene glycol (PEG 3350) (Miralax) 17 g oral packet 17 g  17 g Oral Daily PRN    magnesium hydroxide (Milk of Magnesia) 400 MG/5ML oral suspension 30 mL  30 mL Oral Daily PRN    bisacodyl (Dulcolax) 10 MG rectal suppository 10 mg  10 mg Rectal Daily PRN    fleet enema (Fleet) rectal enema 133 mL  1 enema Rectal Once PRN   [2]    dextrose in lactated ringers Stopped (06/29/25 1100)

## 2025-06-30 NOTE — PLAN OF CARE
Dr.Najjar at bedside this morning for LLE suturing. BLE elevated. Patient standing at bedside through out the day, up to bathroom with rolling chair. PT order in. Potassium replaced (see MAR). Transfer order in. Call light within reach, safety measures maintained. Family at bedside.      Problem: Patient Centered Care  Goal: Patient preferences are identified and integrated in the patient's plan of care  Description: Interventions:  - What would you like us to know as we care for you? She has a supportive family.  - Provide timely, complete, and accurate information to patient/family  - Incorporate patient and family knowledge, values, beliefs, and cultural backgrounds into the planning and delivery of care  - Encourage patient/family to participate in care and decision-making at the level they choose  - Honor patient and family perspectives and choices  Outcome: Progressing     Problem: Diabetes/Glucose Control  Goal: Glucose maintained within prescribed range  Description: INTERVENTIONS:  - Monitor Blood Glucose as ordered  - Assess for signs and symptoms of hyperglycemia and hypoglycemia  - Administer ordered medications to maintain glucose within target range  - Assess barriers to adequate nutritional intake and initiate nutrition consult as needed  - Instruct patient on self management of diabetes  Outcome: Progressing     Problem: PAIN - ADULT  Goal: Verbalizes/displays adequate comfort level or patient's stated pain goal  Description: INTERVENTIONS:  - Encourage pt to monitor pain and request assistance  - Assess pain using appropriate pain scale  - Administer analgesics based on type and severity of pain and evaluate response  - Implement non-pharmacological measures as appropriate and evaluate response  - Consider cultural and social influences on pain and pain management  - Manage/alleviate anxiety  - Utilize distraction and/or relaxation techniques  - Monitor for opioid side effects  - Notify MD/LIP if  interventions unsuccessful or patient reports new pain  - Anticipate increased pain with activity and pre-medicate as appropriate  Outcome: Progressing     Problem: RISK FOR INFECTION - ADULT  Goal: Absence of fever/infection during anticipated neutropenic period  Description: INTERVENTIONS  - Monitor WBC  - Administer growth factors as ordered  - Implement neutropenic guidelines  Outcome: Progressing     Problem: SAFETY ADULT - FALL  Goal: Free from fall injury  Description: INTERVENTIONS:  - Assess pt frequently for physical needs  - Identify cognitive and physical deficits and behaviors that affect risk of falls.  - [place] fall precautions as indicated by assessment.  - Educate pt/family on patient safety including physical limitations  - Instruct pt to call for assistance with activity based on assessment  - Modify environment to reduce risk of injury  - Provide assistive devices as appropriate  - Consider OT/PT consult to assist with strengthening/mobility  - Encourage toileting schedule  Outcome: Progressing     Problem: DISCHARGE PLANNING  Goal: Discharge to home or other facility with appropriate resources  Description: INTERVENTIONS:  - Identify barriers to discharge w/pt and caregiver  - Include patient/family/discharge partner in discharge planning  - Arrange for needed discharge resources and transportation as appropriate  - Identify discharge learning needs (meds, wound care, etc)  - Arrange for interpreters to assist at discharge as needed  - Consider post-discharge preferences of patient/family/discharge partner  - Complete POLST form as appropriate  - Assess patient's ability to be responsible for managing their own health  - Refer to Case Management Department for coordinating discharge planning if the patient needs post-hospital services based on physician/LIP order or complex needs related to functional status, cognitive ability or social support system  Outcome: Progressing     Problem:  RESPIRATORY - ADULT  Goal: Achieves optimal ventilation and oxygenation  Description: INTERVENTIONS:  - Assess for changes in respiratory status  - Assess for changes in mentation and behavior  - Position to facilitate oxygenation and minimize respiratory effort  - Oxygen supplementation based on oxygen saturation or ABGs  - Provide Smoking Cessation handout, if applicable  - Encourage broncho-pulmonary hygiene including cough, deep breathe, Incentive Spirometry  - Assess the need for suctioning and perform as needed  - Assess and instruct to report SOB or any respiratory difficulty  - Respiratory Therapy support as indicated  - Manage/alleviate anxiety  - Monitor for signs/symptoms of CO2 retention  Outcome: Progressing     Problem: GASTROINTESTINAL - ADULT  Goal: Maintains or returns to baseline bowel function  Description: INTERVENTIONS:  - Assess bowel function  - Maintain adequate hydration with IV or PO as ordered and tolerated  - Evaluate effectiveness of GI medications  - Encourage mobilization and activity  - Obtain nutritional consult as needed  - Establish a toileting routine/schedule  - Consider collaborating with pharmacy to review patient's medication profile  Outcome: Progressing     Problem: GENITOURINARY - ADULT  Goal: Absence of urinary retention  Description: INTERVENTIONS:  - Assess patient’s ability to void and empty bladder  - Monitor intake/output and perform bladder scan as needed  - Follow urinary retention protocol/standard of care  - Consider collaborating with pharmacy to review patient's medication profile  - Implement strategies to promote bladder emptying  Outcome: Progressing     Problem: METABOLIC/FLUID AND ELECTROLYTES - ADULT  Goal: Glucose maintained within prescribed range  Description: INTERVENTIONS:  - Monitor Blood Glucose as ordered  - Assess for signs and symptoms of hyperglycemia and hypoglycemia  - Administer ordered medications to maintain glucose within target range  -  Assess barriers to adequate nutritional intake and initiate nutrition consult as needed  - Instruct patient on self management of diabetes  Outcome: Progressing     Problem: SKIN/TISSUE INTEGRITY - ADULT  Goal: Incision(s), wounds(s) or drain site(s) healing without S/S of infection  Description: INTERVENTIONS:  - Assess and document risk factors for pressure ulcer development  - Assess and document skin integrity  - Assess and document dressing/incision, wound bed, drain sites and surrounding tissue  - Implement wound care per orders  - Initiate isolation precautions as appropriate  - Initiate Pressure Ulcer prevention bundle as indicated  Outcome: Progressing     Problem: MUSCULOSKELETAL - ADULT  Goal: Return mobility to safest level of function  Description: INTERVENTIONS:  - Assess patient stability and activity tolerance for standing, transferring and ambulating w/ or w/o assistive devices  - Assist with transfers and ambulation using safe patient handling equipment as needed  - Ensure adequate protection for wounds/incisions during mobilization  - Obtain PT/OT consults as needed  - Advance activity as appropriate  - Communicate ordered activity level and limitations with patient/family  Outcome: Progressing

## 2025-06-30 NOTE — PROGRESS NOTES
06/30/25 0735   Wound 06/28/25 Leg Left;Medial;Lateral   Date First Assessed: 06/28/25   Primary Wound Type: Incision  Location: (c) Leg  Wound Location Orientation: Left;Medial;Lateral   Wound Image     Site Assessment Clean;Moist;Fragile;Granulation tissue;Red;Painful;Edema   Closure Approximated;Sutures   Sutures Removed Intact No (Comment)   Drainage Amount Scant   Drainage Description Serosanguineous   Treatments Betadine;Site Care;Compression   Dressing 4x4s;ABD Pad;Ace bandage;Kerlix roll;Xeroform   Dressing Changed Changed   Dressing Status Removed;Old drainage   Wound Length (cm) 17 cm   Non-staged Wound Description Full thickness   [place]-wound Assessment Dry;Intact;Clean;Edema;Fragile;Painful   Wound Granulation Tissue Red   Wound Odor None   Wound 06/28/25 Leg Right   Date First Assessed/Time First Assessed: 06/28/25 1340   Primary Wound Type: Incision  Location: Leg  Wound Location Orientation: Right   Wound Image    Site Assessment Clean;Dry;Fragile   Closure Approximated;Skin glue   Drainage Amount None   Dressing Open to air   Non-staged Wound Description Full thickness   [place]-wound Assessment Clean;Dry;Intact;Painful   Wound Odor None   Wound Follow Up   Follow up needed Yes     Pt seen after receiving iv pain meds per bedside RN. Dr. Najjar present to assess LLE fasciotomy wounds and close the medial wound w sutures. Wound was treated with betadine, dressed with xeroform, gauze, abd, kerlix and 2 ace wraps for compression from toes to knee, elevated w 2 pillows. Pt is able to bear weight, plan for assessment and dressing change again tomorrow morning w Dr. Najjar then suture removal in approx 2 weeks. Pt stated understanding to keep LLE elevated. Bedside RN present during dressing change.

## 2025-06-30 NOTE — PROGRESS NOTES
[facility]  Vascular Surgery Progress Note    Mandi Moran Patient Status:  Inpatient    1955 MRN [id number]   Location [facility] 2W/SW Attending Amira Kitchen *   Hosp Day # 3 PCP Johnny Westfall MD     Objective:   Temp: 98.4 °F (36.9 °C)  Pulse: 94  Resp: 21  BP: 127/84    Intake/Output:     Intake/Output Summary (Last 24 hours) at 2025 1212  Last data filed at 2025 2300  Gross per 24 hour   Intake 1103.7 ml   Output 2675 ml   Net -1571.3 ml       Events Yesterday/Overnight:  Stable overnight.  She was diuresed by about a liter and a half.  Hemoglobin is down to 7.2 but essentially stable.  Denies any excessive pain in either foot.    Exam:  All the incisions are healing well.  The medial fasciotomy incision was sutures were closed after the area was infiltrated with half-and-half mixture of 1% lidocaine and Marcaine.  A Kerlix and an Ace bandage was applied.      Impression/Plan:  The patient is doing well.  She can be transferred to the floor where she can start working with physical therapy.  She would likely require more compression in order to allow for walking with decreased pain.    Medications:  Current Hospital Medications[1]    Laboratory/Data:    LABS:  Recent Labs   Lab 25  0510 25  0400 25  0912   WBC 10.6 9.4 10.1   HGB 9.6* 7.4* 7.2*   MCV 87.3 91.9 90.4   .0 169.0 189.0       Recent Labs   Lab 25  0510 25  0400 25  0912    140 137   K 4.4 4.3 3.8   * 109 107   CO2 25.0 27.0 26.0   BUN 20 16 15   CREATSERUM 1.34* 1.28* 1.23*   GLU 95 152* 140*   CA 8.5* 8.4* 8.9     No results for input(s): \"PTP\", \"INR\", \"PTT\" in the last 168 hours.  No results for input(s): \"ALT\", \"AST\", \"ALB\", \"AMYLASE\", \"LIPASE\", \"LDH\" in the last 168 hours.    Invalid input(s): \"ALPHOS\", \"TBIL\", \"DBIL\", \"TPROT\"  No results for input(s): \"TROP\" in the last 168 hours.  No results found for: \"ANAS\", \"BETTY\", \"ANASCRN\"  No results  for input(s): \"PCACT\", \"PSACT\", \"AT3ACT\", \"HIPAB\", \"PATHI\", \"STALA\", \"DRVVTRATIO\", \"DRVVT\", \"STACLOT\", \"CARIGG\", \"U1DR6HBNHQ\", \"[id number]\", \"RA\", \"HAVIGM\", \"HBCIGM\", \"HCVAB\", \"HBSAG\", \"HBCAB\", \"HBVDNAINTERP\", \"ANAS\", \"C3\", \"C4\" in the last 168 hours.    Samer F. Najjar, MD  Vascular Surgery  [facility]  6/30/2025  12:12 PM         [1]   Current Facility-Administered Medications:     pantoprazole (Protonix) DR tab 40 mg, 40 mg, Oral, QAM AC    cholecalciferol (Vitamin D3) tab 5,000 Units, 5,000 Units, Oral, Daily    ascorbic acid (Vitamin C) tab 500 mg, 500 mg, Oral, Daily    albuterol (Ventolin HFA) 108 (90 Base) MCG/ACT inhaler 1 puff, 1 puff, Inhalation, Q4H PRN    amLODIPine (Norvasc) tab 10 mg, 10 mg, Oral, Nightly    gabapentin (Neurontin) cap 800 mg, 800 mg, Oral, BID    fluticasone-salmeterol (Advair Diskus) 250-50 MCG/ACT inhaler 1 puff, 1 puff, Inhalation, BID    allopurinol (Zyloprim) tab 100 mg, 100 mg, Oral, BID    aspirin DR tab 81 mg, 81 mg, Oral, Daily    fenofibrate micronized (Lofibra) cap 134 mg, 134 mg, Oral, Daily with breakfast    insulin degludec (Tresiba) 100 units/mL flextouch 10 Units, 10 Units, Subcutaneous, Nightly    dextrose in lactated ringers 5% infusion, , Intravenous, Continuous    [DISCONTINUED] acetaminophen (Tylenol) tab 650 mg, 650 mg, Oral, Q4H PRN **OR** HYDROcodone-acetaminophen (Norco) 5-325 MG per tab 1 tablet, 1 tablet, Oral, Q4H PRN **OR** HYDROcodone-acetaminophen (Norco) 5-325 MG per tab 2 tablet, 2 tablet, Oral, Q4H PRN    ondansetron (Zofran) 4 MG/2ML injection 4 mg, 4 mg, Intravenous, Q6H PRN    metoclopramide (Reglan) 5 mg/mL injection 5 mg, 5 mg, Intravenous, Q8H PRN    enoxaparin (Lovenox) 40 MG/0.4ML SUBQ injection 40 mg, 40 mg, Subcutaneous, Nightly    insulin aspart (NovoLOG) 100 Units/mL FlexPen 1-7 Units, 1-7 Units, Subcutaneous, TID CC and HS    ipratropium-albuterol (Duoneb) 0.5-2.5 (3) MG/3ML inhalation solution 3 mL, 3 mL, Nebulization,  Q6H PRN    acetaminophen (Tylenol) tab 650 mg, 650 mg, Oral, Q6H PRN    HYDROmorphone (Dilaudid) 1 MG/ML injection 0.1 mg, 0.1 mg, Intravenous, Q2H PRN **OR** HYDROmorphone (Dilaudid) 1 MG/ML injection 0.2 mg, 0.2 mg, Intravenous, Q2H PRN **OR** HYDROmorphone (Dilaudid) 1 MG/ML injection 0.4 mg, 0.4 mg, Intravenous, Q2H PRN    nicotine (Nicoderm CQ) 21 MG/24HR patch 1 patch, 1 patch, Transdermal, Daily    docusate sodium (Colace) cap 100 mg, 100 mg, Oral, BID    polyethylene glycol (PEG 3350) (Miralax) 17 g oral packet 17 g, 17 g, Oral, Daily PRN    magnesium hydroxide (Milk of Magnesia) 400 MG/5ML oral suspension 30 mL, 30 mL, Oral, Daily PRN    bisacodyl (Dulcolax) 10 MG rectal suppository 10 mg, 10 mg, Rectal, Daily PRN    fleet enema (Fleet) rectal enema 133 mL, 1 enema, Rectal, Once PRN

## 2025-07-01 LAB
ANION GAP SERPL CALC-SCNC: 6 MMOL/L (ref 0–18)
ANTIBODY SCREEN: NEGATIVE
BASOPHILS # BLD AUTO: 0.04 X10(3) UL (ref 0–0.2)
BASOPHILS NFR BLD AUTO: 0.4 %
BUN BLD-MCNC: 21 MG/DL (ref 9–23)
BUN/CREAT SERPL: 17.5 (ref 10–20)
CALCIUM BLD-MCNC: 8.8 MG/DL (ref 8.7–10.4)
CHLORIDE SERPL-SCNC: 107 MMOL/L (ref 98–112)
CO2 SERPL-SCNC: 27 MMOL/L (ref 21–32)
CREAT BLD-MCNC: 1.2 MG/DL (ref 0.55–1.02)
DEPRECATED RDW RBC AUTO: 49.5 FL (ref 35.1–46.3)
EGFRCR SERPLBLD CKD-EPI 2021: 48 ML/MIN/1.73M2 (ref 60–?)
EOSINOPHIL # BLD AUTO: 0.5 X10(3) UL (ref 0–0.7)
EOSINOPHIL NFR BLD AUTO: 5.3 %
ERYTHROCYTE [DISTWIDTH] IN BLOOD BY AUTOMATED COUNT: 15.1 % (ref 11–15)
GLUCOSE BLD-MCNC: 143 MG/DL (ref 70–99)
GLUCOSE BLDC GLUCOMTR-MCNC: 114 MG/DL (ref 70–99)
GLUCOSE BLDC GLUCOMTR-MCNC: 140 MG/DL (ref 70–99)
GLUCOSE BLDC GLUCOMTR-MCNC: 144 MG/DL (ref 70–99)
GLUCOSE BLDC GLUCOMTR-MCNC: 170 MG/DL (ref 70–99)
HCT VFR BLD AUTO: 20.2 % (ref 35–48)
HGB BLD-MCNC: 6.7 G/DL (ref 12–16)
HGB BLD-MCNC: 8 G/DL (ref 12–16)
IMM GRANULOCYTES # BLD AUTO: 0.1 X10(3) UL (ref 0–1)
IMM GRANULOCYTES NFR BLD: 1.1 %
LYMPHOCYTES # BLD AUTO: 1.69 X10(3) UL (ref 1–4)
LYMPHOCYTES NFR BLD AUTO: 18 %
MCH RBC QN AUTO: 29.9 PG (ref 26–34)
MCHC RBC AUTO-ENTMCNC: 33.2 G/DL (ref 31–37)
MCV RBC AUTO: 90.2 FL (ref 80–100)
MONOCYTES # BLD AUTO: 0.89 X10(3) UL (ref 0.1–1)
MONOCYTES NFR BLD AUTO: 9.5 %
NEUTROPHILS # BLD AUTO: 6.18 X10 (3) UL (ref 1.5–7.7)
NEUTROPHILS # BLD AUTO: 6.18 X10(3) UL (ref 1.5–7.7)
NEUTROPHILS NFR BLD AUTO: 65.7 %
OSMOLALITY SERPL CALC.SUM OF ELEC: 295 MOSM/KG (ref 275–295)
PLATELET # BLD AUTO: 185 10(3)UL (ref 150–450)
POTASSIUM SERPL-SCNC: 4.1 MMOL/L (ref 3.5–5.1)
POTASSIUM SERPL-SCNC: 4.1 MMOL/L (ref 3.5–5.1)
RBC # BLD AUTO: 2.24 X10(6)UL (ref 3.8–5.3)
RH BLOOD TYPE: POSITIVE
SODIUM SERPL-SCNC: 140 MMOL/L (ref 136–145)
WBC # BLD AUTO: 9.4 X10(3) UL (ref 4–11)

## 2025-07-01 PROCEDURE — 99233 SBSQ HOSP IP/OBS HIGH 50: CPT | Performed by: HOSPITALIST

## 2025-07-01 PROCEDURE — 30233N1 TRANSFUSION OF NONAUTOLOGOUS RED BLOOD CELLS INTO PERIPHERAL VEIN, PERCUTANEOUS APPROACH: ICD-10-PCS | Performed by: INTERNAL MEDICINE

## 2025-07-01 RX ORDER — SODIUM CHLORIDE 9 MG/ML
INJECTION, SOLUTION INTRAVENOUS ONCE
Status: DISCONTINUED | OUTPATIENT
Start: 2025-07-01 | End: 2025-07-03

## 2025-07-01 RX ADMIN — NICOTINE 1 PATCH: 21 MG/24HR PATCH, TRANSDERMAL 24 HOURS TRANSDERMAL at 08:10:00

## 2025-07-01 RX ADMIN — ASCORBIC ACID 500 MG: 500 MG TABLET ORAL at 08:10:00

## 2025-07-01 RX ADMIN — HYDROCODONE BITARTRATE AND ACETAMINOPHEN 2 TABLET: 5; 325 TABLET ORAL at 22:24:00

## 2025-07-01 RX ADMIN — FLUTICASONE PROPIONATE AND SALMETEROL 1 PUFF: 250; 50 POWDER RESPIRATORY (INHALATION) at 22:28:00

## 2025-07-01 RX ADMIN — ALLOPURINOL 100 MG: 100 TABLET ORAL at 08:10:00

## 2025-07-01 RX ADMIN — PANTOPRAZOLE SODIUM 40 MG: 40 TABLET, DELAYED RELEASE ORAL at 06:49:00

## 2025-07-01 RX ADMIN — DOCUSATE SODIUM 100 MG: 100 CAPSULE, LIQUID FILLED ORAL at 08:10:00

## 2025-07-01 RX ADMIN — INSULIN DEGLUDEC 10 UNITS: 100 INJECTION, SOLUTION SUBCUTANEOUS at 22:30:00

## 2025-07-01 RX ADMIN — AMLODIPINE BESYLATE 10 MG: 10 TABLET ORAL at 22:25:00

## 2025-07-01 RX ADMIN — GABAPENTIN 800 MG: 400 CAPSULE ORAL at 08:10:00

## 2025-07-01 RX ADMIN — ENOXAPARIN SODIUM 40 MG: 100 INJECTION SUBCUTANEOUS at 22:26:00

## 2025-07-01 RX ADMIN — HYDROCODONE BITARTRATE AND ACETAMINOPHEN 2 TABLET: 5; 325 TABLET ORAL at 12:15:00

## 2025-07-01 RX ADMIN — HYDROCODONE BITARTRATE AND ACETAMINOPHEN 1 TABLET: 5; 325 TABLET ORAL at 18:24:00

## 2025-07-01 RX ADMIN — HYDROCODONE BITARTRATE AND ACETAMINOPHEN 1 TABLET: 5; 325 TABLET ORAL at 04:24:00

## 2025-07-01 RX ADMIN — GABAPENTIN 800 MG: 400 CAPSULE ORAL at 22:25:00

## 2025-07-01 RX ADMIN — POLYETHYLENE GLYCOL 3350 17 G: 17 POWDER, FOR SOLUTION ORAL at 06:49:00

## 2025-07-01 RX ADMIN — DOCUSATE SODIUM 100 MG: 100 CAPSULE, LIQUID FILLED ORAL at 22:25:00

## 2025-07-01 RX ADMIN — ALLOPURINOL 100 MG: 100 TABLET ORAL at 22:25:00

## 2025-07-01 RX ADMIN — FLUTICASONE PROPIONATE AND SALMETEROL 1 PUFF: 250; 50 POWDER RESPIRATORY (INHALATION) at 08:11:00

## 2025-07-01 RX ADMIN — ASPIRIN 81 MG: 81 TABLET ORAL at 08:10:00

## 2025-07-01 RX ADMIN — FENOFIBRATE 134 MG: 134 CAPSULE ORAL at 08:10:00

## 2025-07-01 NOTE — PLAN OF CARE
Problem: Patient Centered Care  Goal: Patient preferences are identified and integrated in the patient's plan of care  Description: Interventions:  - What would you like us to know as we care for you? She has a supportive family.  - Provide timely, complete, and accurate information to patient/family  - Incorporate patient and family knowledge, values, beliefs, and cultural backgrounds into the planning and delivery of care  - Encourage patient/family to participate in care and decision-making at the level they choose  - Honor patient and family perspectives and choices  Outcome: Progressing     Problem: Diabetes/Glucose Control  Goal: Glucose maintained within prescribed range  Description: INTERVENTIONS:  - Monitor Blood Glucose as ordered  - Assess for signs and symptoms of hyperglycemia and hypoglycemia  - Administer ordered medications to maintain glucose within target range  - Assess barriers to adequate nutritional intake and initiate nutrition consult as needed  - Instruct patient on self management of diabetes  Outcome: Progressing     Problem: Patient/Family Goals  Goal: Patient/Family Long Term Goal  Description: Patient's Long Term Goal: discharge from hospital     Interventions:  - Monitor site post procedure  - monitor vitals  - PT/OT  - Educate patient on post procedure protocol  - See additional Care Plan goals for specific interventions  Outcome: Progressing  Goal: Patient/Family Short Term Goal  Description: Patient's Short Term Goal: improve pain     Interventions:   - monitor vital signs   - monitor blood glucose levels  - monitor appropriate labs  - pain management   - administer medications per order  - follow MD orders  - diagnostics per order  - update and inform patient and family on plan of care  - See additional Care Plan goals for specific interventions  Outcome: Progressing     Problem: PAIN - ADULT  Goal: Verbalizes/displays adequate comfort level or patient's stated pain  goal  Description: INTERVENTIONS:  - Encourage pt to monitor pain and request assistance  - Assess pain using appropriate pain scale  - Administer analgesics based on type and severity of pain and evaluate response  - Implement non-pharmacological measures as appropriate and evaluate response  - Consider cultural and social influences on pain and pain management  - Manage/alleviate anxiety  - Utilize distraction and/or relaxation techniques  - Monitor for opioid side effects  - Notify MD/LIP if interventions unsuccessful or patient reports new pain  - Anticipate increased pain with activity and pre-medicate as appropriate  Outcome: Progressing     Problem: RISK FOR INFECTION - ADULT  Goal: Absence of fever/infection during anticipated neutropenic period  Description: INTERVENTIONS  - Monitor WBC  - Administer growth factors as ordered  - Implement neutropenic guidelines  Outcome: Progressing     Problem: SAFETY ADULT - FALL  Goal: Free from fall injury  Description: INTERVENTIONS:  - Assess pt frequently for physical needs  - Identify cognitive and physical deficits and behaviors that affect risk of falls.  - [place] fall precautions as indicated by assessment.  - Educate pt/family on patient safety including physical limitations  - Instruct pt to call for assistance with activity based on assessment  - Modify environment to reduce risk of injury  - Provide assistive devices as appropriate  - Consider OT/PT consult to assist with strengthening/mobility  - Encourage toileting schedule  Outcome: Progressing     Problem: DISCHARGE PLANNING  Goal: Discharge to home or other facility with appropriate resources  Description: INTERVENTIONS:  - Identify barriers to discharge w/pt and caregiver  - Include patient/family/discharge partner in discharge planning  - Arrange for needed discharge resources and transportation as appropriate  - Identify discharge learning needs (meds, wound care, etc)  - Arrange for interpreters  to assist at discharge as needed  - Consider post-discharge preferences of patient/family/discharge partner  - Complete POLST form as appropriate  - Assess patient's ability to be responsible for managing their own health  - Refer to Case Management Department for coordinating discharge planning if the patient needs post-hospital services based on physician/LIP order or complex needs related to functional status, cognitive ability or social support system  Outcome: Progressing     Problem: RESPIRATORY - ADULT  Goal: Achieves optimal ventilation and oxygenation  Description: INTERVENTIONS:  - Assess for changes in respiratory status  - Assess for changes in mentation and behavior  - Position to facilitate oxygenation and minimize respiratory effort  - Oxygen supplementation based on oxygen saturation or ABGs  - Provide Smoking Cessation handout, if applicable  - Encourage broncho-pulmonary hygiene including cough, deep breathe, Incentive Spirometry  - Assess the need for suctioning and perform as needed  - Assess and instruct to report SOB or any respiratory difficulty  - Respiratory Therapy support as indicated  - Manage/alleviate anxiety  - Monitor for signs/symptoms of CO2 retention  Outcome: Progressing     Problem: GASTROINTESTINAL - ADULT  Goal: Maintains or returns to baseline bowel function  Description: INTERVENTIONS:  - Assess bowel function  - Maintain adequate hydration with IV or PO as ordered and tolerated  - Evaluate effectiveness of GI medications  - Encourage mobilization and activity  - Obtain nutritional consult as needed  - Establish a toileting routine/schedule  - Consider collaborating with pharmacy to review patient's medication profile  Outcome: Progressing     Problem: GENITOURINARY - ADULT  Goal: Absence of urinary retention  Description: INTERVENTIONS:  - Assess patient’s ability to void and empty bladder  - Monitor intake/output and perform bladder scan as needed  - Follow urinary  retention protocol/standard of care  - Consider collaborating with pharmacy to review patient's medication profile  - Implement strategies to promote bladder emptying  Outcome: Progressing     Problem: METABOLIC/FLUID AND ELECTROLYTES - ADULT  Goal: Glucose maintained within prescribed range  Description: INTERVENTIONS:  - Monitor Blood Glucose as ordered  - Assess for signs and symptoms of hyperglycemia and hypoglycemia  - Administer ordered medications to maintain glucose within target range  - Assess barriers to adequate nutritional intake and initiate nutrition consult as needed  - Instruct patient on self management of diabetes  Outcome: Progressing  Goal: Electrolytes maintained within normal limits  Description: INTERVENTIONS:  - Monitor labs and rhythm and assess patient for signs and symptoms of electrolyte imbalances  - Administer electrolyte replacement as ordered  - Monitor response to electrolyte replacements, including rhythm and repeat lab results as appropriate  - Fluid restriction as ordered  - Instruct patient on fluid and nutrition restrictions as appropriate  Outcome: Progressing     Problem: SKIN/TISSUE INTEGRITY - ADULT  Goal: Incision(s), wounds(s) or drain site(s) healing without S/S of infection  Description: INTERVENTIONS:  - Assess and document risk factors for pressure ulcer development  - Assess and document skin integrity  - Assess and document dressing/incision, wound bed, drain sites and surrounding tissue  - Implement wound care per orders  - Initiate isolation precautions as appropriate  - Initiate Pressure Ulcer prevention bundle as indicated  Outcome: Progressing     Problem: MUSCULOSKELETAL - ADULT  Goal: Return mobility to safest level of function  Description: INTERVENTIONS:  - Assess patient stability and activity tolerance for standing, transferring and ambulating w/ or w/o assistive devices  - Assist with transfers and ambulation using safe patient handling equipment as  needed  - Ensure adequate protection for wounds/incisions during mobilization  - Obtain PT/OT consults as needed  - Advance activity as appropriate  - Communicate ordered activity level and limitations with patient/family  Outcome: Progressing     Problem: HEMATOLOGIC - ADULT  Goal: Free from bleeding injury  Description: (Example usage: patient with low platelets)  INTERVENTIONS:  - Avoid intramuscular injections, enemas and rectal medication administration  - Ensure safe mobilization of patient  - Hold pressure on venipuncture sites to achieve adequate hemostasis  - Assess for signs and symptoms of internal bleeding  - Monitor lab trends  - Patient is to report abnormal signs of bleeding to staff  - Avoid use of toothpicks and dental floss  - Use electric shaver for shaving  - Use soft bristle tooth brush  - Limit straining and forceful nose blowing  Outcome: Progressing     Problem: Impaired Functional Mobility  Goal: Achieve highest/safest level of mobility/gait  Description: Interventions:  - Assess patient's functional ability and stability  - Promote increasing activity/tolerance for mobility and gait  - Educate and engage patient/family in tolerated activity level and precautions  - Recommend use of  RW for transfers and ambulation  - Recommend patient transfer to bedside chair toward strongest side  - When transferring patient, block weaker knee for safety  Outcome: Progressing    Patient transferred to floor from CCU. Tolerating 2L nasal cannula and some ambulation. Wound care completed per CCU RN.

## 2025-07-01 NOTE — PROGRESS NOTES
New PT orders received chart reviewed. Hgb is currently 6.7 subtherapeutic range for PT session. PT will hold at this time and continue to follow to progress functional mobility as medical progress allows. Pt to receive 1 unit PRBC today.

## 2025-07-01 NOTE — CM/SW NOTE
07/01/25 1200   CM/ Referral Data   Referral Source    Reason for Referral Discharge planning   Informant Patient   Medical Hx   Does patient have an established PCP? Yes  (Johnny Westfall)   Significant Past Medical/Mental Health Hx Abdominal aortic aneurysm, peripheral arterial disease with left external iliac chronic occlusion and reconstitution bilateral lower extremity moderate disease, bilateral carotid atherosclerosis 50%, depression, peripheral neuropathy, hypertension, hyperlipidemia, gout, chronic kidney disease stage 3, gastroesophageal reflux disease, diabetes mellitus type 2, chronic obstructive pulmonary disease, degenerative joint disease of lumbar spine, and generalized osteoarthritis.   Patient Info   Patient's Current Mental Status at Time of Assessment Alert;Oriented   Patient's Home Environment House   Number of Levels in Home 1   Number of Stair in Home 2 JOSÉ   Patient lives with Alone   Patient Status Prior to Admission   Independent with ADLs and Mobility Yes   Services in place prior to admission DME/Supplies at home   Type of DME/Supplies Straight Cane;Rollator Walker;Shower Chair;Grab Bars   Discharge Needs   Anticipated D/C needs Assisted/Supported living;To be determined      self referral for discharge planning. Dx: Abdominal aortic aneurysm without rupture, s/p left lower extremity 4 compartment fasciotomy/evacuation of the hematoma, s/p right popliteal artery thrombectomy via calf incision on 6/28.     CM met with patient at bedside. Verified home address and PCP info. Lives alone. There are 2 stairs to enter. Bedroom and bathroom on main floor. Patient is ambulatory with a walker. Also has a cane, shower stool, walk in shower, and safety bars in bathroom. No other DME.     She reported she had HH services for about 6 weeks or so setup through [facility], but did not recall the name. No [facility] admissions. She is open to [facility] if recommended and  aware we will have to submit for insurance approval.     PLAN: TBD-pending PT/OT evals, course of stay, and medical clearance    / to remain available for support and/or discharge planning     Manju MCLEANN RN   Nurse    [phone]

## 2025-07-01 NOTE — PHYSICAL THERAPY NOTE
PHYSICAL THERAPY EVALUATION - INPATIENT     Room Number: 220/220-A  Evaluation Date: 7/1/2025  Type of Evaluation: Initial   Physician Order: PT Eval and Treat    Presenting Problem: AAA, Left leg faciotomy  Co-Morbidities : spinal stenosis, embolism, spinal stenosis, DM  Reason for Therapy: Mobility Dysfunction and Discharge Planning    PHYSICAL THERAPY ASSESSMENT   Patient is a 70 year old female admitted 6/27/2025 for 6/2825 LLE fasciotomy, post AAA .  Prior to admission, patient's baseline is Mod indep with a rollator at home lives with sister.  Patient is currently functioning below baseline with bed mobility, transfers, gait, stair negotiation, maintaining seated position, standing prolonged periods, and performing household tasks.  Patient is requiring moderate assist as a result of the following impairments: decreased functional strength, decreased endurance/aerobic capacity, pain, impaired standing balance, impaired motor planning, decreased muscular endurance, medical status, and limited AROM LLE ROM.  Physical Therapy will continue to follow for duration of hospitalization.    Patient will benefit from continued skilled PT Services to promote return to prior level of function and safety with continuous assistance and gradual rehabilitative therapy .    PLAN DURING HOSPITALIZATION  Nursing Mobility Recommendation : 1 Assist  PT Device Recommendation: Rolling walker  PT Treatment Plan: Bed mobility, Body mechanics, Endurance, Energy conservation, Patient education, Gait training, Range of motion, Strengthening, Transfer training, Balance training, Family education  Rehab Potential : Good  Frequency (Obs): Daily     PHYSICAL THERAPY MEDICAL/SOCIAL HISTORY   History related to current admission: Patient is a 70 year old female who is here for treatment of her abdominal aortic aneurysm treatment.  The patient had a CT angiogram recently that revealed that the size of the aneurysm that has reached 5.1 cm.  He  is here to discuss different treatment options.  She denies any chest pain.  She is able to go up multiple flights of the stairs.  She walks her dog on a daily basis.  She continues to smoke and is not ready to stop.  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  She does not have any arm claudication or steal symptoms.      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    SUBJECTIVE  I feel ok just real sore with the L leg when I try to stand or walk.     PHYSICAL THERAPY EXAMINATION   OBJECTIVE  Precautions: Cardiac, Limb alert - left (LLE faciotomy)  Fall Risk: Standard fall risk    WEIGHT BEARING RESTRICTION  L Lower Extremity: Weight Bearing as Tolerated      PAIN ASSESSMENT  Ratin  Location: LLE faciotomy incision  Management Techniques: Activity promotion, Body mechanics, Breathing techniques, Relaxation, Repositioning    COGNITION  Overall Cognitive Status:  WFL - within functional limits  Hyper verbal A/O x 4  RANGE OF MOTION  AND STRENGTH ASSESSMENT  Upper extremity ROM and strength are within functional limits   Lower extremity ROM is within functional limits   Lower extremity strength is within functional limits LLE 3-/5, 5/5     BALANCE  Static Sitting: Good  Dynamic Sitting: Fair +  Static Standing: Fair -  Dynamic Standing: Fair -       AM-PAC '6-Clicks' INPATIENT SHORT FORM - BASIC MOBILITY  How much difficulty does the patient currently have...  Patient Difficulty: Turning over in bed (including adjusting bedclothes, sheets and blankets)?: A Little   Patient Difficulty: Sitting down on and standing up from a chair with arms (e.g., wheelchair, bedside commode, etc.): A Little   Patient Difficulty: Moving from lying on back to sitting on the side of the bed?: A Little   How much help from another person does the patient currently need...   Help from Another: Moving to and from a bed to a chair (including a wheelchair)?: A Lot   Help from Another: Need to walk in hospital room?: A Lot   Help from Another: Climbing 3-5 steps with a railing?: Total     AM-PAC Score:  Raw Score: 14   Approx Degree of Impairment: 61.29%   Standardized Score (AM-PAC Scale): 38.1   CMS Modifier (G-Code): CL    FUNCTIONAL ABILITY STATUS  Functional Mobility/Gait Assessment  Gait Assistance: Moderate assistance  Distance (ft): 5  Assistive Device: Rolling walker  Pattern: Shuffle, L Foot flat, L Decreased stance time (LLE antalgic gait unsteady fatgiues quickly)  Rolling: minimal assist  Supine to Sit: minimal assist  Sit to Supine: moderate assist  Sit to Stand: moderate assist    Exercise/Education Provided:  Bed mobility  Body mechanics  Energy conservation  Functional activity tolerated  Gait training  Posture  ROM  Strengthening  Lower therapeutic exercise:  Ankle pumps  Heel slides  LAQ  Transfer training    Skilled Therapy Provided: Pt ed with bed mobility and transfers with min A to EOB and mod A for amb 5 shuffling steps and up to a chair with  shuffling gait and decreased LLE step length with LLE antalgic gait post fasciotomy. Vascular has cleared the patient for activity as tolerated. Pt ed with LE therex and ROM in a chair x 10 reps for ROM and strength. Pt will benefit from GR with PT, OT to regain mod indep mobility prior to returning home. GR with PT, OT is recommended.     The patient's Approx Degree of Impairment: 61.29% has been calculated based on documentation in the [facility] '6 clicks' Inpatient Basic Mobility Short Form.  Research supports that patients with this level of impairment may benefit from GR with PT, OT.    Final disposition will be made by interdisciplinary medical team.    Patient End of Session: Up in chair, With  staff, Call light within reach, Needs met, RN aware of session/findings, All patient questions and concerns addressed, Alarm set, Family present    CURRENT GOALS  Goals to be met by: 7/15/2025  Patient Goal Patient's self-stated goal is: Return home    Goal #1 Patient is able to demonstrate supine - sit EOB @ level: independent     Goal #1   Current Status    Goal #2 Patient is able to demonstrate transfers Sit to/from Stand at assistance level: modified independent with walker - rolling     Goal #2  Current Status    Goal #3 Patient is able to ambulate 300 feet with assist device: walker - rolling at assistance level: modified independent   Goal #3   Current Status    Goal #4 Patient will negotiate 5 stairs/one curb w/ assistive device and supervision   Goal #4   Current Status    Goal #5 Patient to demonstrate independence with home activity/exercise instructions provided to patient in preparation for discharge.   Goal #5   Current Status    Goal #6    Goal #6  Current Status      Patient Evaluation Complexity Level:  History Low - no personal factors and/or co-morbidities   Examination of body systems Low -  addressing 1-2 elements   Clinical Presentation Low- Stable   Clinical Decision Making  Low Complexity     Gait  Training: 10 minutes

## 2025-07-01 NOTE — PLAN OF CARE
Patient's Hgb was 6.7 this AM. Type and screen done. 1 unit PRBC given. Repeat Hgb 8.0. PT worked with patient. Call light within reach, safety measures maintained. Legs elevated, PRN pain medication (see MAR). Transfer order in. Report given to Jaclyn ARANGO. Tele box ordered. All personal belongings sent up with patient.      Problem: Patient Centered Care  Goal: Patient preferences are identified and integrated in the patient's plan of care  Description: Interventions:  - What would you like us to know as we care for you? She has a supportive family.  - Provide timely, complete, and accurate information to patient/family  - Incorporate patient and family knowledge, values, beliefs, and cultural backgrounds into the planning and delivery of care  - Encourage patient/family to participate in care and decision-making at the level they choose  - Honor patient and family perspectives and choices  Outcome: Progressing     Problem: Diabetes/Glucose Control  Goal: Glucose maintained within prescribed range  Description: INTERVENTIONS:  - Monitor Blood Glucose as ordered  - Assess for signs and symptoms of hyperglycemia and hypoglycemia  - Administer ordered medications to maintain glucose within target range  - Assess barriers to adequate nutritional intake and initiate nutrition consult as needed  - Instruct patient on self management of diabetes  Outcome: Progressing     Problem: PAIN - ADULT  Goal: Verbalizes/displays adequate comfort level or patient's stated pain goal  Description: INTERVENTIONS:  - Encourage pt to monitor pain and request assistance  - Assess pain using appropriate pain scale  - Administer analgesics based on type and severity of pain and evaluate response  - Implement non-pharmacological measures as appropriate and evaluate response  - Consider cultural and social influences on pain and pain management  - Manage/alleviate anxiety  - Utilize distraction and/or relaxation techniques  - Monitor for  opioid side effects  - Notify MD/LIP if interventions unsuccessful or patient reports new pain  - Anticipate increased pain with activity and pre-medicate as appropriate  Outcome: Progressing     Problem: RISK FOR INFECTION - ADULT  Goal: Absence of fever/infection during anticipated neutropenic period  Description: INTERVENTIONS  - Monitor WBC  - Administer growth factors as ordered  - Implement neutropenic guidelines  Outcome: Progressing     Problem: SAFETY ADULT - FALL  Goal: Free from fall injury  Description: INTERVENTIONS:  - Assess pt frequently for physical needs  - Identify cognitive and physical deficits and behaviors that affect risk of falls.  - Stanton fall precautions as indicated by assessment.  - Educate pt/family on patient safety including physical limitations  - Instruct pt to call for assistance with activity based on assessment  - Modify environment to reduce risk of injury  - Provide assistive devices as appropriate  - Consider OT/PT consult to assist with strengthening/mobility  - Encourage toileting schedule  Outcome: Progressing     Problem: RESPIRATORY - ADULT  Goal: Achieves optimal ventilation and oxygenation  Description: INTERVENTIONS:  - Assess for changes in respiratory status  - Assess for changes in mentation and behavior  - Position to facilitate oxygenation and minimize respiratory effort  - Oxygen supplementation based on oxygen saturation or ABGs  - Provide Smoking Cessation handout, if applicable  - Encourage broncho-pulmonary hygiene including cough, deep breathe, Incentive Spirometry  - Assess the need for suctioning and perform as needed  - Assess and instruct to report SOB or any respiratory difficulty  - Respiratory Therapy support as indicated  - Manage/alleviate anxiety  - Monitor for signs/symptoms of CO2 retention  Outcome: Progressing     Problem: GASTROINTESTINAL - ADULT  Goal: Maintains or returns to baseline bowel function  Description: INTERVENTIONS:  - Assess  bowel function  - Maintain adequate hydration with IV or PO as ordered and tolerated  - Evaluate effectiveness of GI medications  - Encourage mobilization and activity  - Obtain nutritional consult as needed  - Establish a toileting routine/schedule  - Consider collaborating with pharmacy to review patient's medication profile  Outcome: Progressing     Problem: GENITOURINARY - ADULT  Goal: Absence of urinary retention  Description: INTERVENTIONS:  - Assess patient’s ability to void and empty bladder  - Monitor intake/output and perform bladder scan as needed  - Follow urinary retention protocol/standard of care  - Consider collaborating with pharmacy to review patient's medication profile  - Implement strategies to promote bladder emptying  Outcome: Progressing     Problem: METABOLIC/FLUID AND ELECTROLYTES - ADULT  Goal: Glucose maintained within prescribed range  Description: INTERVENTIONS:  - Monitor Blood Glucose as ordered  - Assess for signs and symptoms of hyperglycemia and hypoglycemia  - Administer ordered medications to maintain glucose within target range  - Assess barriers to adequate nutritional intake and initiate nutrition consult as needed  - Instruct patient on self management of diabetes  Outcome: Progressing     Problem: SKIN/TISSUE INTEGRITY - ADULT  Goal: Incision(s), wounds(s) or drain site(s) healing without S/S of infection  Description: INTERVENTIONS:  - Assess and document risk factors for pressure ulcer development  - Assess and document skin integrity  - Assess and document dressing/incision, wound bed, drain sites and surrounding tissue  - Implement wound care per orders  - Initiate isolation precautions as appropriate  - Initiate Pressure Ulcer prevention bundle as indicated  Outcome: Progressing     Problem: MUSCULOSKELETAL - ADULT  Goal: Return mobility to safest level of function  Description: INTERVENTIONS:  - Assess patient stability and activity tolerance for standing, transferring  and ambulating w/ or w/o assistive devices  - Assist with transfers and ambulation using safe patient handling equipment as needed  - Ensure adequate protection for wounds/incisions during mobilization  - Obtain PT/OT consults as needed  - Advance activity as appropriate  - Communicate ordered activity level and limitations with patient/family  Outcome: Progressing     Problem: DISCHARGE PLANNING  Goal: Discharge to home or other facility with appropriate resources  Description: INTERVENTIONS:  - Identify barriers to discharge w/pt and caregiver  - Include patient/family/discharge partner in discharge planning  - Arrange for needed discharge resources and transportation as appropriate  - Identify discharge learning needs (meds, wound care, etc)  - Arrange for interpreters to assist at discharge as needed  - Consider post-discharge preferences of patient/family/discharge partner  - Complete POLST form as appropriate  - Assess patient's ability to be responsible for managing their own health  - Refer to Case Management Department for coordinating discharge planning if the patient needs post-hospital services based on physician/LIP order or complex needs related to functional status, cognitive ability or social support system  Outcome: Progressing

## 2025-07-01 NOTE — PROGRESS NOTES
07/01/25 1101   Wound 06/28/25 Leg Left;Medial;Lateral   Date First Assessed: 06/28/25   Primary Wound Type: Incision  Location: (c) Leg  Wound Location Orientation: Left;Medial;Lateral   Wound Image     Site Assessment Clean;Fragile;Painful   Closure Approximated;Sutures   Sutures Removed Intact No (Comment)   Drainage Amount Scant   Drainage Description Serosanguineous   Treatments Site Care   Dressing Ace bandage;ABD Pad;Kerlix roll;Xeroform   Dressing Changed Changed   Dressing Status Dressing Changed;Removed;Old drainage   Non-staged Wound Description Full thickness   Lucrecia-wound Assessment Clean;Dry;Intact   Wound Odor None   Wound 06/28/25 Leg Right   Date First Assessed/Time First Assessed: 06/28/25 1340   Primary Wound Type: Incision  Location: Leg  Wound Location Orientation: Right   Wound Image    Site Assessment Clean;Fragile   Closure Skin glue   Drainage Amount None   Dressing Open to air   Wound Follow Up   Follow up needed Yes     Pt seen w Dr. Najjar and Corby HENRIQUEZ for assessment of left fasciotomy wounds. Xeroform, abd, kerlix q 48 hour dressings ordered. Beginning tomorrow AND TO CONTINUE AT DISCHARGE TO [facility]: SPANDAGRIP BLE FROM TOES TO KNEES W ELEVATION OF LEGS  to inhibit edema. LLE elevated on 2 pillows. Bedside RN updated. Pt planned to tx to floor today, plan for [facility] for PT.

## 2025-07-01 NOTE — PROGRESS NOTES
[facility]  part of [facility]    Progress Note    Mandi Moran Patient Status:  Inpatient    1955 MRN [id number]   Location [facility] OPERATING ROOM Attending Amira Kitchen *   Hosp Day # 4 PCP Johnny Westfall MD     Chief complaint pad     Subjective:   Mandi Moran is a(n) 70 year old female pt seen this am. Pain still high when she tried to walk. Had to use rolling walker to bathroom     ROS:   No cp, sob   No c/d   No n/v     Objective:   Blood pressure 130/52, pulse 84, temperature 99.4 °F (37.4 °C), temperature source Temporal, resp. rate 17, height 5' 5\" (1.651 m), weight 173 lb 4.5 oz (78.6 kg), SpO2 99%.      Intake/Output Summary (Last 24 hours) at 2025 1224  Last data filed at 2025 0000  Gross per 24 hour   Intake 370 ml   Output --   Net 370 ml       Patient Weight(s) for the past 336 hrs:   Weight   25 1105 173 lb 4.5 oz (78.6 kg)   25 0434 175 lb 14.8 oz (79.8 kg)   25 1112 178 lb 9.2 oz (81 kg)   25 0800 187 lb 9.6 oz (85.1 kg)   25 1401 181 lb (82.1 kg)           General appearance: alert, appears stated age and cooperative  Pulmonary:  clear to auscultation bilaterally  Cardiovascular: S1, S2 normal, no murmur, click, rub or gallop, regular rate and rhythm  Abdominal: soft, non-tender; bowel sounds normal; no masses,  no organomegaly  Extremities: left leg bandage in place c/d/I, right leg - no c/c/e           Medicines:   Current Hospital Medications[1]            Blood Sugar Medications            insulin glargine (LANTUS SOLOSTAR) 100 UNIT/ML Subcutaneous Solution Pen-injector    metFORMIN HCl 1000 MG Oral Tab            Blood Pressure and Cardiac Medications            amLODIPine 10 MG Oral Tab            Medication Infusions[2]        Lab Results   Component Value Date    WBC 9.4 2025    HGB 6.7 (LL) 2025    HCT 20.2 (L) 2025    .0 2025    CREATSERUM 1.20 (H) 2025     BUN 21 07/01/2025     07/01/2025    K 4.1 07/01/2025    K 4.1 07/01/2025     07/01/2025    CO2 27.0 07/01/2025     (H) 07/01/2025    CA 8.8 07/01/2025    ALB 4.7 12/03/2024    ALKPHO 60 12/03/2024    BILT 0.3 12/03/2024    TP 7.7 12/03/2024    AST 22 12/03/2024    ALT 18 12/03/2024       No results found.            Results:     CBC:    Lab Results   Component Value Date    WBC 9.4 07/01/2025    WBC 10.1 06/30/2025    WBC 9.4 06/29/2025     Lab Results   Component Value Date    HGB 6.7 (LL) 07/01/2025    HGB 7.2 (L) 06/30/2025    HGB 7.4 (L) 06/29/2025      Lab Results   Component Value Date    .0 07/01/2025    .0 06/30/2025    .0 06/29/2025       Recent Labs   Lab 06/29/25  0400 06/30/25  0912 07/01/25  0357   * 140* 143*   BUN 16 15 21   CREATSERUM 1.28* 1.23* 1.20*   CA 8.4* 8.9 8.8    137 140   K 4.3 3.8 4.1  4.1    107 107   CO2 27.0 26.0 27.0           Assessment and Plan:         ASSESSMENT AND PLAN:    1.       Abdominal aortic aneurysm, status post endovascular graft repair.    Now with compartment syndrome of left leg and thrombus in popliteal artery of right foot plan for OR now per Dr Najjar for fasciotomy and thrombectomy  Sp fasciotomy and thrombectomy pod #3  Pain control with norco.    Pt/ot   Tx to floor   2.       Peripheral arterial disease.as above   3.       Chronic obstructive pulmonary disease.  Continue home medication and monitor. Duonebs. Nicotine patch.   4.       Gout.  Continue home medication. allopurinol  5.       Hyperlipidemia.  Continue home medication.  cont fenofibrate   6.       Essential hypertension.  Continue home medication and monitor.   Amlodipine   7.       Chronic kidney disease stage 3.  Continue to monitor postoperatively.  Creat 1.2 near baseline   8.       Diabetes mellitus type 2.  Continue home medications.  Monitor Accu-Cheks.  Sliding scale insulin.  Cont iss and tresiba 10  9. Acute blood loss anemia  - transfuse one unit and repeat hb after.              Amira Villeda-Joanie,          Chart reviewed, including current vitals, notes, labs and imaging  Labs ordered and medications adjusted as outlined above  Coordinate care with care team/consultants  Discussed with patient results of tests, management plan as outlined above, and the need for ongoing hospitalization  D/w RN     MDM high            PHYSICIAN Certification of Need for Inpatient Hospitalization - Initial Certification    Patient will require inpatient services that will reasonably be expected to span two midnight's based on the clinical documentation in H+P.   Based on patients current state of illness, I anticipate that, after discharge, patient will require TBD.        Supplementary Documentation:   DVT Mechanical Prophylaxis:   SCDs,    DVT Pharmacologic Prophylaxis   Medication    enoxaparin (Lovenox) 40 MG/0.4ML SUBQ injection 40 mg         DVT Pharmacologic prophylaxis: Aspirin 81 mg      Code Status: Not on file  Carrillo: No urinary catheter in place  Carrillo Duration (in days): 2  Central line:    NORY:         **Certification      PHYSICIAN Certification of Need for Inpatient Hospitalization - Initial Certification    Patient will require inpatient services that will reasonably be expected to span two midnight's based on the clinical documentation in H+P.   Based on patients current state of illness, I anticipate that, after discharge, patient will require TBD.                  [1]   Current Facility-Administered Medications   Medication Dose Route Frequency    sodium chloride 0.9% infusion   Intravenous Once    pantoprazole (Protonix) DR tab 40 mg  40 mg Oral QAM AC    cholecalciferol (Vitamin D3) tab 5,000 Units  5,000 Units Oral Daily    ascorbic acid (Vitamin C) tab 500 mg  500 mg Oral Daily    albuterol (Ventolin HFA) 108 (90 Base) MCG/ACT inhaler 1 puff  1 puff Inhalation Q4H PRN    amLODIPine (Norvasc) tab 10 mg  10 mg Oral Nightly     gabapentin (Neurontin) cap 800 mg  800 mg Oral BID    fluticasone-salmeterol (Advair Diskus) 250-50 MCG/ACT inhaler 1 puff  1 puff Inhalation BID    allopurinol (Zyloprim) tab 100 mg  100 mg Oral BID    aspirin DR tab 81 mg  81 mg Oral Daily    fenofibrate micronized (Lofibra) cap 134 mg  134 mg Oral Daily with breakfast    insulin degludec (Tresiba) 100 units/mL flextouch 10 Units  10 Units Subcutaneous Nightly    HYDROcodone-acetaminophen (Norco) 5-325 MG per tab 1 tablet  1 tablet Oral Q4H PRN    Or    HYDROcodone-acetaminophen (Norco) 5-325 MG per tab 2 tablet  2 tablet Oral Q4H PRN    ondansetron (Zofran) 4 MG/2ML injection 4 mg  4 mg Intravenous Q6H PRN    metoclopramide (Reglan) 5 mg/mL injection 5 mg  5 mg Intravenous Q8H PRN    enoxaparin (Lovenox) 40 MG/0.4ML SUBQ injection 40 mg  40 mg Subcutaneous Nightly    insulin aspart (NovoLOG) 100 Units/mL FlexPen 1-7 Units  1-7 Units Subcutaneous TID CC and HS    ipratropium-albuterol (Duoneb) 0.5-2.5 (3) MG/3ML inhalation solution 3 mL  3 mL Nebulization Q6H PRN    acetaminophen (Tylenol) tab 650 mg  650 mg Oral Q6H PRN    HYDROmorphone (Dilaudid) 1 MG/ML injection 0.1 mg  0.1 mg Intravenous Q2H PRN    Or    HYDROmorphone (Dilaudid) 1 MG/ML injection 0.2 mg  0.2 mg Intravenous Q2H PRN    Or    HYDROmorphone (Dilaudid) 1 MG/ML injection 0.4 mg  0.4 mg Intravenous Q2H PRN    nicotine (Nicoderm CQ) 21 MG/24HR patch 1 patch  1 patch Transdermal Daily    docusate sodium (Colace) cap 100 mg  100 mg Oral BID    polyethylene glycol (PEG 3350) (Miralax) 17 g oral packet 17 g  17 g Oral Daily PRN    magnesium hydroxide (Milk of Magnesia) 400 MG/5ML oral suspension 30 mL  30 mL Oral Daily PRN    bisacodyl (Dulcolax) 10 MG rectal suppository 10 mg  10 mg Rectal Daily PRN    fleet enema (Fleet) rectal enema 133 mL  1 enema Rectal Once PRN   [2]

## 2025-07-01 NOTE — PROGRESS NOTES
[facility]  part of [facility]     Vascular Surgery Progress Note    Mandi Moran Patient Status:  Inpatient    1955 MRN [id number]   Location [facility] 2W/SW Attending Amira Kitchen *   Hosp Day # 4 PCP Johnny Westfall MD     Objective:   Temp: 98.9 °F (37.2 °C)  Pulse: 83  Resp: 16  BP: 131/49    Intake/Output:     Intake/Output Summary (Last 24 hours) at 2025 1023  Last data filed at 2025 0000  Gross per 24 hour   Intake 370 ml   Output --   Net 370 ml         Events Yesterday/Overnight:  S/p endovascular AAA repair 25 followed by a left calf fasciotomy and right popliteal artery thrombectomy via a calf incision 25.   Doing better this morning.  Having 1 unit of PRBCs at the time of the visit.   Pain is well controlled.   Denies any abdominal or groin pains.     Exam:  The right calf incision is dry and healing appropriately.   The right foot is warm with acceptable DP and PT signals.   Dressing on right lower leg is dry and intact.   The left foot is slightly edematous with strong and palpable DP signals.     Impression/Plan:  Doing better post-operatively.   Must continue daily therapy with Aspirin 81 mg and Lovenox subcutaneously.   Dressing on left lower leg can be changed tomorrow 25.  PT/OT evaluation can be initiated at this time.   Order for transfer to regular floor in place.       Medications:  Current Hospital Medications[1]    Laboratory/Data:    LABS:  Recent Labs   Lab 25  0510 25  0400 25  0912 25  0357   WBC 10.6 9.4 10.1 9.4   HGB 9.6* 7.4* 7.2* 6.7*   MCV 87.3 91.9 90.4 90.2   .0 169.0 189.0 185.0       Recent Labs   Lab 25  0510 25  0400 25  0912 25  0357    140 137 140   K 4.4 4.3 3.8 4.1  4.1   * 109 107 107   CO2 25.0 27.0 26.0 27.0   BUN 20 16 15 21   CREATSERUM 1.34* 1.28* 1.23* 1.20*   GLU 95 152* 140* 143*   CA 8.5* 8.4* 8.9 8.8     No results for  input(s): \"PTP\", \"INR\", \"PTT\" in the last 168 hours.  No results for input(s): \"ALT\", \"AST\", \"ALB\", \"AMYLASE\", \"LIPASE\", \"LDH\" in the last 168 hours.    Invalid input(s): \"ALPHOS\", \"TBIL\", \"DBIL\", \"TPROT\"  No results for input(s): \"TROP\" in the last 168 hours.  No results found for: \"ANAS\", \"BETTY\", \"ANASCRN\"  No results for input(s): \"PCACT\", \"PSACT\", \"AT3ACT\", \"HIPAB\", \"PATHI\", \"STALA\", \"DRVVTRATIO\", \"DRVVT\", \"STACLOT\", \"CARIGG\", \"L3GP7RXVIR\", \"Y0EL2PFHIY\", \"RA\", \"HAVIGM\", \"HBCIGM\", \"HCVAB\", \"HBSAG\", \"HBCAB\", \"HBVDNAINTERP\", \"ANAS\", \"C3\", \"C4\" in the last 168 hours.     FLORENCE Watson  Division of Vascular Surgery.          [1]   Current Facility-Administered Medications   Medication Dose Route Frequency    sodium chloride 0.9% infusion   Intravenous Once    pantoprazole (Protonix) DR tab 40 mg  40 mg Oral QAM AC    cholecalciferol (Vitamin D3) tab 5,000 Units  5,000 Units Oral Daily    ascorbic acid (Vitamin C) tab 500 mg  500 mg Oral Daily    albuterol (Ventolin HFA) 108 (90 Base) MCG/ACT inhaler 1 puff  1 puff Inhalation Q4H PRN    amLODIPine (Norvasc) tab 10 mg  10 mg Oral Nightly    gabapentin (Neurontin) cap 800 mg  800 mg Oral BID    fluticasone-salmeterol (Advair Diskus) 250-50 MCG/ACT inhaler 1 puff  1 puff Inhalation BID    allopurinol (Zyloprim) tab 100 mg  100 mg Oral BID    aspirin DR tab 81 mg  81 mg Oral Daily    fenofibrate micronized (Lofibra) cap 134 mg  134 mg Oral Daily with breakfast    insulin degludec (Tresiba) 100 units/mL flextouch 10 Units  10 Units Subcutaneous Nightly    HYDROcodone-acetaminophen (Norco) 5-325 MG per tab 1 tablet  1 tablet Oral Q4H PRN    Or    HYDROcodone-acetaminophen (Norco) 5-325 MG per tab 2 tablet  2 tablet Oral Q4H PRN    ondansetron (Zofran) 4 MG/2ML injection 4 mg  4 mg Intravenous Q6H PRN    metoclopramide (Reglan) 5 mg/mL injection 5 mg  5 mg Intravenous Q8H PRN    enoxaparin (Lovenox) 40 MG/0.4ML SUBQ injection 40 mg  40 mg Subcutaneous Nightly     insulin aspart (NovoLOG) 100 Units/mL FlexPen 1-7 Units  1-7 Units Subcutaneous TID CC and HS    ipratropium-albuterol (Duoneb) 0.5-2.5 (3) MG/3ML inhalation solution 3 mL  3 mL Nebulization Q6H PRN    acetaminophen (Tylenol) tab 650 mg  650 mg Oral Q6H PRN    HYDROmorphone (Dilaudid) 1 MG/ML injection 0.1 mg  0.1 mg Intravenous Q2H PRN    Or    HYDROmorphone (Dilaudid) 1 MG/ML injection 0.2 mg  0.2 mg Intravenous Q2H PRN    Or    HYDROmorphone (Dilaudid) 1 MG/ML injection 0.4 mg  0.4 mg Intravenous Q2H PRN    nicotine (Nicoderm CQ) 21 MG/24HR patch 1 patch  1 patch Transdermal Daily    docusate sodium (Colace) cap 100 mg  100 mg Oral BID    polyethylene glycol (PEG 3350) (Miralax) 17 g oral packet 17 g  17 g Oral Daily PRN    magnesium hydroxide (Milk of Magnesia) 400 MG/5ML oral suspension 30 mL  30 mL Oral Daily PRN    bisacodyl (Dulcolax) 10 MG rectal suppository 10 mg  10 mg Rectal Daily PRN    fleet enema (Fleet) rectal enema 133 mL  1 enema Rectal Once PRN

## 2025-07-02 LAB
ANION GAP SERPL CALC-SCNC: 9 MMOL/L (ref 0–18)
BASOPHILS # BLD AUTO: 0.03 X10(3) UL (ref 0–0.2)
BASOPHILS NFR BLD AUTO: 0.3 %
BLOOD TYPE BARCODE: 7300
BUN BLD-MCNC: 23 MG/DL (ref 9–23)
BUN/CREAT SERPL: 18.1 (ref 10–20)
CALCIUM BLD-MCNC: 9.3 MG/DL (ref 8.7–10.4)
CHLORIDE SERPL-SCNC: 105 MMOL/L (ref 98–112)
CO2 SERPL-SCNC: 26 MMOL/L (ref 21–32)
CREAT BLD-MCNC: 1.27 MG/DL (ref 0.55–1.02)
DEPRECATED RDW RBC AUTO: 53.1 FL (ref 35.1–46.3)
EGFRCR SERPLBLD CKD-EPI 2021: 45 ML/MIN/1.73M2 (ref 60–?)
EOSINOPHIL # BLD AUTO: 0.58 X10(3) UL (ref 0–0.7)
EOSINOPHIL NFR BLD AUTO: 6.4 %
ERYTHROCYTE [DISTWIDTH] IN BLOOD BY AUTOMATED COUNT: 16.3 % (ref 11–15)
GLUCOSE BLD-MCNC: 158 MG/DL (ref 70–99)
GLUCOSE BLDC GLUCOMTR-MCNC: 118 MG/DL (ref 70–99)
GLUCOSE BLDC GLUCOMTR-MCNC: 130 MG/DL (ref 70–99)
GLUCOSE BLDC GLUCOMTR-MCNC: 142 MG/DL (ref 70–99)
GLUCOSE BLDC GLUCOMTR-MCNC: 157 MG/DL (ref 70–99)
HCT VFR BLD AUTO: 25.5 % (ref 35–48)
HGB BLD-MCNC: 8.3 G/DL (ref 12–16)
IMM GRANULOCYTES # BLD AUTO: 0.12 X10(3) UL (ref 0–1)
IMM GRANULOCYTES NFR BLD: 1.3 %
LYMPHOCYTES # BLD AUTO: 1.31 X10(3) UL (ref 1–4)
LYMPHOCYTES NFR BLD AUTO: 14.4 %
MCH RBC QN AUTO: 28.9 PG (ref 26–34)
MCHC RBC AUTO-ENTMCNC: 32.5 G/DL (ref 31–37)
MCV RBC AUTO: 88.9 FL (ref 80–100)
MONOCYTES # BLD AUTO: 0.55 X10(3) UL (ref 0.1–1)
MONOCYTES NFR BLD AUTO: 6 %
NEUTROPHILS # BLD AUTO: 6.53 X10 (3) UL (ref 1.5–7.7)
NEUTROPHILS # BLD AUTO: 6.53 X10(3) UL (ref 1.5–7.7)
NEUTROPHILS NFR BLD AUTO: 71.6 %
OSMOLALITY SERPL CALC.SUM OF ELEC: 297 MOSM/KG (ref 275–295)
PLATELET # BLD AUTO: 293 10(3)UL (ref 150–450)
POTASSIUM SERPL-SCNC: 3.8 MMOL/L (ref 3.5–5.1)
RBC # BLD AUTO: 2.87 X10(6)UL (ref 3.8–5.3)
SODIUM SERPL-SCNC: 140 MMOL/L (ref 136–145)
UNIT VOLUME: 350 ML
WBC # BLD AUTO: 9.1 X10(3) UL (ref 4–11)

## 2025-07-02 PROCEDURE — 99233 SBSQ HOSP IP/OBS HIGH 50: CPT | Performed by: INTERNAL MEDICINE

## 2025-07-02 RX ORDER — POTASSIUM CHLORIDE 1500 MG/1
40 TABLET, EXTENDED RELEASE ORAL ONCE
Status: COMPLETED | OUTPATIENT
Start: 2025-07-02 | End: 2025-07-02

## 2025-07-02 RX ADMIN — HYDROCODONE BITARTRATE AND ACETAMINOPHEN 2 TABLET: 5; 325 TABLET ORAL at 22:22:00

## 2025-07-02 RX ADMIN — PANTOPRAZOLE SODIUM 40 MG: 40 TABLET, DELAYED RELEASE ORAL at 04:46:00

## 2025-07-02 RX ADMIN — NICOTINE 1 PATCH: 21 MG/24HR PATCH, TRANSDERMAL 24 HOURS TRANSDERMAL at 09:51:00

## 2025-07-02 RX ADMIN — POTASSIUM CHLORIDE 40 MEQ: 1500 TABLET, EXTENDED RELEASE ORAL at 14:27:00

## 2025-07-02 RX ADMIN — ENOXAPARIN SODIUM 40 MG: 100 INJECTION SUBCUTANEOUS at 19:34:00

## 2025-07-02 RX ADMIN — HYDROCODONE BITARTRATE AND ACETAMINOPHEN 1 TABLET: 5; 325 TABLET ORAL at 04:46:00

## 2025-07-02 RX ADMIN — ALLOPURINOL 100 MG: 100 TABLET ORAL at 19:34:00

## 2025-07-02 RX ADMIN — ASCORBIC ACID 500 MG: 500 MG TABLET ORAL at 09:50:00

## 2025-07-02 RX ADMIN — ALLOPURINOL 100 MG: 100 TABLET ORAL at 09:50:00

## 2025-07-02 RX ADMIN — HYDROMORPHONE HYDROCHLORIDE 0.4 MG: 1 INJECTION, SOLUTION INTRAMUSCULAR; INTRAVENOUS; SUBCUTANEOUS at 19:30:00

## 2025-07-02 RX ADMIN — ASPIRIN 81 MG: 81 TABLET ORAL at 09:50:00

## 2025-07-02 RX ADMIN — AMLODIPINE BESYLATE 10 MG: 10 TABLET ORAL at 19:34:00

## 2025-07-02 RX ADMIN — INSULIN DEGLUDEC 10 UNITS: 100 INJECTION, SOLUTION SUBCUTANEOUS at 22:22:00

## 2025-07-02 RX ADMIN — FLUTICASONE PROPIONATE AND SALMETEROL 1 PUFF: 250; 50 POWDER RESPIRATORY (INHALATION) at 09:51:00

## 2025-07-02 RX ADMIN — GABAPENTIN 800 MG: 400 CAPSULE ORAL at 19:34:00

## 2025-07-02 RX ADMIN — DOCUSATE SODIUM 100 MG: 100 CAPSULE, LIQUID FILLED ORAL at 19:34:00

## 2025-07-02 RX ADMIN — FENOFIBRATE 134 MG: 134 CAPSULE ORAL at 09:49:00

## 2025-07-02 RX ADMIN — HYDROCODONE BITARTRATE AND ACETAMINOPHEN 1 TABLET: 5; 325 TABLET ORAL at 14:27:00

## 2025-07-02 RX ADMIN — GABAPENTIN 800 MG: 400 CAPSULE ORAL at 09:50:00

## 2025-07-02 RX ADMIN — FLUTICASONE PROPIONATE AND SALMETEROL 1 PUFF: 250; 50 POWDER RESPIRATORY (INHALATION) at 22:23:00

## 2025-07-02 RX ADMIN — HYDROCODONE BITARTRATE AND ACETAMINOPHEN 1 TABLET: 5; 325 TABLET ORAL at 09:58:00

## 2025-07-02 RX ADMIN — DOCUSATE SODIUM 100 MG: 100 CAPSULE, LIQUID FILLED ORAL at 09:49:00

## 2025-07-02 NOTE — PROGRESS NOTES
Patient feels much better today.   Pain is well controlled.   Was able to stand and walk a few steps yesterday evening.   Both feet are warm with strong DP signals bilaterally and PT signal on the left.   Must continue daily therapy with Aspirin 81 mg and Lovenox subcutaneously.   Needs to increase ambulation with PT support to avoid deconditioning.   Might be able to discharge tomorrow 7/03/25 if continues progressing well.

## 2025-07-02 NOTE — PROGRESS NOTES
[facility]  part of [facility]    Progress Note    Mandi Moran Patient Status:  Inpatient    1955 MRN [id number]   Location [facility] OPERATING ROOM Attending Amira Kitchen *   Hosp Day # 5 PCP Johnny Westfall MD     Chief complaint pad     Subjective:   Mandi Moran is a(n) 70 year old female pt seen this am. Pain still high when she tried to walk. Had to use rolling walker to bathroom     ROS:   No cp, sob   No c/d   No n/v     Objective:   Blood pressure 139/68, pulse 79, temperature 98 °F (36.7 °C), temperature source Axillary, resp. rate 20, height 5' 5\" (1.651 m), weight 185 lb (83.9 kg), SpO2 95%.      Intake/Output Summary (Last 24 hours) at 2025 1809  Last data filed at 2025 1746  Gross per 24 hour   Intake 680 ml   Output 650 ml   Net 30 ml       Patient Weight(s) for the past 336 hrs:   Weight   25 0443 185 lb (83.9 kg)   25 1105 173 lb 4.5 oz (78.6 kg)   25 0434 175 lb 14.8 oz (79.8 kg)   25 1112 178 lb 9.2 oz (81 kg)   25 0800 187 lb 9.6 oz (85.1 kg)   25 1401 181 lb (82.1 kg)           General appearance: alert, appears stated age and cooperative  Pulmonary:  clear to auscultation bilaterally  Cardiovascular: S1, S2 normal, no murmur, click, rub or gallop, regular rate and rhythm  Abdominal: soft, non-tender; bowel sounds normal; no masses,  no organomegaly  Extremities: left leg bandage in place c/d/I, right leg - no c/c/e           Medicines:   Current Hospital Medications[1]            Blood Sugar Medications            insulin glargine (LANTUS SOLOSTAR) 100 UNIT/ML Subcutaneous Solution Pen-injector    metFORMIN HCl 1000 MG Oral Tab            Blood Pressure and Cardiac Medications            amLODIPine 10 MG Oral Tab            Medication Infusions[2]        Lab Results   Component Value Date    WBC 9.1 2025    HGB 8.3 (L) 2025    HCT 25.5 (L) 2025    .0 2025     CREATSERUM 1.27 (H) 07/02/2025    BUN 23 07/02/2025     07/02/2025    K 3.8 07/02/2025     07/02/2025    CO2 26.0 07/02/2025     (H) 07/02/2025    CA 9.3 07/02/2025    ALB 4.7 12/03/2024    ALKPHO 60 12/03/2024    BILT 0.3 12/03/2024    TP 7.7 12/03/2024    AST 22 12/03/2024    ALT 18 12/03/2024       No results found.            Results:     CBC:    Lab Results   Component Value Date    WBC 9.1 07/02/2025    WBC 9.4 07/01/2025    WBC 10.1 06/30/2025     Lab Results   Component Value Date    HGB 8.3 (L) 07/02/2025    HGB 8.0 (L) 07/01/2025    HGB 6.7 (LL) 07/01/2025      Lab Results   Component Value Date    .0 07/02/2025    .0 07/01/2025    .0 06/30/2025       Recent Labs   Lab 06/30/25  0912 07/01/25  0357 07/02/25  1117   * 143* 158*   BUN 15 21 23   CREATSERUM 1.23* 1.20* 1.27*   CA 8.9 8.8 9.3    140 140   K 3.8 4.1  4.1 3.8    107 105   CO2 26.0 27.0 26.0           Assessment and Plan:         ASSESSMENT AND PLAN:    1.       Abdominal aortic aneurysm, status post endovascular graft repair.    Now with compartment syndrome of left leg and thrombus in popliteal artery of right foot plan for OR now per Dr Najjar for fasciotomy and thrombectomy  Sp fasciotomy and thrombectomy pod #3  Pain control with norco.    Pt/ot   Tx to floor   2.       Peripheral arterial disease.as above   3.       Chronic obstructive pulmonary disease.  Continue home medication and monitor. Duonebs. Nicotine patch.   4.       Gout.  Continue home medication. allopurinol  5.       Hyperlipidemia.  Continue home medication.  cont fenofibrate   6.       Essential hypertension.  Continue home medication and monitor.   Amlodipine   7.       Chronic kidney disease stage 3.  Continue to monitor postoperatively.  Creat 1.2 near baseline   8.       Diabetes mellitus type 2.  Continue home medications.  Monitor Accu-Cheks.  Sliding scale insulin.  Cont iss and tresiba 10  9. Acute blood  loss anemia - transfuse one unit and repeat hb after.              Amira Villalta,          Chart reviewed, including current vitals, notes, labs and imaging  Labs ordered and medications adjusted as outlined above  Coordinate care with care team/consultants  Discussed with patient results of tests, management plan as outlined above, and the need for ongoing hospitalization  D/w RN     MDM high            PHYSICIAN Certification of Need for Inpatient Hospitalization - Initial Certification    Patient will require inpatient services that will reasonably be expected to span two midnight's based on the clinical documentation in H+P.   Based on patients current state of illness, I anticipate that, after discharge, patient will require TBD.        Supplementary Documentation:   DVT Mechanical Prophylaxis:   SCDs,    DVT Pharmacologic Prophylaxis   Medication    enoxaparin (Lovenox) 40 MG/0.4ML SUBQ injection 40 mg         DVT Pharmacologic prophylaxis: Aspirin 81 mg      Code Status: Not on file  Carrillo: No urinary catheter in place  Carrillo Duration (in days): 2  Central line:    NORY: 7/3/2025        **Certification      PHYSICIAN Certification of Need for Inpatient Hospitalization - Initial Certification    Patient will require inpatient services that will reasonably be expected to span two midnight's based on the clinical documentation in H+P.   Based on patients current state of illness, I anticipate that, after discharge, patient will require TBD.                  [1]   Current Facility-Administered Medications   Medication Dose Route Frequency    miconazole 2 % powder   Topical BID PRN    sodium chloride 0.9% infusion   Intravenous Once    pantoprazole (Protonix) DR tab 40 mg  40 mg Oral QAM AC    cholecalciferol (Vitamin D3) tab 5,000 Units  5,000 Units Oral Daily    ascorbic acid (Vitamin C) tab 500 mg  500 mg Oral Daily    albuterol (Ventolin HFA) 108 (90 Base) MCG/ACT inhaler 1 puff  1 puff Inhalation  Q4H PRN    amLODIPine (Norvasc) tab 10 mg  10 mg Oral Nightly    gabapentin (Neurontin) cap 800 mg  800 mg Oral BID    fluticasone-salmeterol (Advair Diskus) 250-50 MCG/ACT inhaler 1 puff  1 puff Inhalation BID    allopurinol (Zyloprim) tab 100 mg  100 mg Oral BID    aspirin DR tab 81 mg  81 mg Oral Daily    fenofibrate micronized (Lofibra) cap 134 mg  134 mg Oral Daily with breakfast    insulin degludec (Tresiba) 100 units/mL flextouch 10 Units  10 Units Subcutaneous Nightly    HYDROcodone-acetaminophen (Norco) 5-325 MG per tab 1 tablet  1 tablet Oral Q4H PRN    Or    HYDROcodone-acetaminophen (Norco) 5-325 MG per tab 2 tablet  2 tablet Oral Q4H PRN    ondansetron (Zofran) 4 MG/2ML injection 4 mg  4 mg Intravenous Q6H PRN    metoclopramide (Reglan) 5 mg/mL injection 5 mg  5 mg Intravenous Q8H PRN    enoxaparin (Lovenox) 40 MG/0.4ML SUBQ injection 40 mg  40 mg Subcutaneous Nightly    insulin aspart (NovoLOG) 100 Units/mL FlexPen 1-7 Units  1-7 Units Subcutaneous TID CC and HS    ipratropium-albuterol (Duoneb) 0.5-2.5 (3) MG/3ML inhalation solution 3 mL  3 mL Nebulization Q6H PRN    acetaminophen (Tylenol) tab 650 mg  650 mg Oral Q6H PRN    HYDROmorphone (Dilaudid) 1 MG/ML injection 0.1 mg  0.1 mg Intravenous Q2H PRN    Or    HYDROmorphone (Dilaudid) 1 MG/ML injection 0.2 mg  0.2 mg Intravenous Q2H PRN    Or    HYDROmorphone (Dilaudid) 1 MG/ML injection 0.4 mg  0.4 mg Intravenous Q2H PRN    nicotine (Nicoderm CQ) 21 MG/24HR patch 1 patch  1 patch Transdermal Daily    docusate sodium (Colace) cap 100 mg  100 mg Oral BID    polyethylene glycol (PEG 3350) (Miralax) 17 g oral packet 17 g  17 g Oral Daily PRN    magnesium hydroxide (Milk of Magnesia) 400 MG/5ML oral suspension 30 mL  30 mL Oral Daily PRN    bisacodyl (Dulcolax) 10 MG rectal suppository 10 mg  10 mg Rectal Daily PRN    fleet enema (Fleet) rectal enema 133 mL  1 enema Rectal Once PRN   [2]

## 2025-07-02 NOTE — OCCUPATIONAL THERAPY NOTE
OCCUPATIONAL THERAPY EVALUATION - INPATIENT     Room Number: 302/302-A  Evaluation Date: 7/2/2025  Type of Evaluation: Initial   Presenting problem: abdominal aortic aneurysm (AAA) without rupture, s/p abdominal aortic aneurysm endovascular repair, s/p left fasciotomy / right leg popliteal thrombectomy  Co-morbidities: Abdominal aortic aneurysm, peripheral arterial disease with left external iliac chronic occlusion and reconstitution bilateral lower extremity moderate disease, bilateral carotid atherosclerosis 50%, depression, peripheral neuropathy, hypertension, hyperlipidemia, gout, chronic kidney disease stage 3, gastroesophageal reflux disease, diabetes mellitus type 2, chronic obstructive pulmonary disease, degenerative joint disease of lumbar spine, and generalized osteoarthritis    Physician Order: IP Consult to Occupational Therapy  Reason for Therapy: ADL/IADL Dysfunction and Discharge Planning    OCCUPATIONAL THERAPY ASSESSMENT   Patient is a 70 year old female admitted 6/27/2025 for abdominal aortic aneurysm (AAA) without rupture; s/p abdominal aortic aneurysm endovascular repair; s/p left left fasciotomy, right leg popliteal thrombectomy.  Prior to admission, patient's baseline is independent with ADLs and mod I for mobility using rollator.  Patient is currently functioning below baseline with toileting, bathing, lower body dressing, transfers, static standing balance, dynamic standing balance, and functional standing tolerance.  Patient is requiring stand-by assist, contact guard assist, and moderate assist as a result of the following impairments: decreased functional strength, decreased endurance, pain, impaired standing balance, decreased muscular endurance, and medical status. Occupational Therapy will continue to follow for duration of hospitalization.    Patient will benefit from continued skilled OT Services to promote return to prior level of function and safety with continuous assistance and  gradual rehabilitative therapy.    PLAN DURING HOSPITALIZATION  OT Device Recommendations: TBD  OT Treatment Plan: Balance activities, Energy conservation/work simplification techniques, ADL training, Functional transfer training, Endurance training, Patient/Family education, Patient/Family training, Compensatory technique education     OCCUPATIONAL THERAPY MEDICAL/SOCIAL HISTORY   Problem List  Principal Problem:    Abdominal aortic aneurysm (AAA) without rupture  Active Problems:    Type 2 diabetes mellitus with stage 3a chronic kidney disease, without long-term current use of insulin (HCC)    Hyperlipidemia    Primary hypertension    Stage 3a chronic kidney disease (HCC)    Chronic obstructive pulmonary disease (HCC)    Chronic gout without tophus    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 Pt lives with her brother and has multiple family members who live nearby who provide support PRN.     SUBJECTIVE  \"I'm getting a little bit better everyday.\"     OCCUPATIONAL THERAPY EXAMINATION      OBJECTIVE  Precautions: Cardiac; Limb alert - left (LLE faciotomy)  Fall Risk: Standard fall risk      PAIN ASSESSMENT  Ratin  Location: LLE  Management Techniques: Activity promotion; Relaxation; Repositioning    COGNITION  Overall Cognitive Status:  WFL - within functional limits    RANGE OF MOTION   Upper extremity ROM is within functional limits     STRENGTH ASSESSMENT  Upper extremity strength is within functional limits     COORDINATION  Gross  Motor: WFL   Fine Motor: WFL     ACTIVITIES OF DAILY LIVING ASSESSMENT  AM-PAC ‘6-Clicks’ Inpatient Daily Activity Short Form  How much help from another person does the patient currently need…  -   Putting on and taking off regular lower body clothing?: A Lot  -   Bathing (including washing, rinsing, drying)?: A Lot  -   Toileting, which includes using toilet, bedpan or urinal? : A Lot  -   Putting on and taking off regular upper body clothing?: None  -   Taking care of personal grooming such as brushing teeth?: A Little  -   Eating meals?: None    AM-PAC Score:  Score: 17  Approx Degree of Impairment: 50.11%  Standardized Score (AM-PAC Scale): 37.26  CMS Modifier (G-Code): CK    FUNCTIONAL TRANSFER ASSESSMENT  Sit to Stand: Chair  Chair: Moderate Assist    BALANCE ASSESSMENT  Static Sitting: Supervision  Static Standing: Contact Guard Assist  Dynamic Sitting: SBA     FUNCTIONAL ADL ASSESSMENT  Eating: Independent  Grooming Seated: Independent  LB Dressing Seated: Stand-by Assist (able to return demo figure four to joshua socks from recliner height)    Skilled Therapy Provided:   RN cleared pt for participation. Pt received in recliner with granddaughter present for session. Pt agreeable to participation in therapy. To assess pt's participation during tasks while also providing intermittent education to maximize participation, OT facilitated the following: functional transfers and adl tasks. Pt tolerated treatment fairly well and completed all tasks with assist levels listed above. Pt reporting significant pain with weight bearing through BLE; LLE greater than RLE. Pt benefited from increased time and cues for body positioning when completing functional transfers. Pt only able to tolerate static standing ~45 seconds before requiring a seated rest break. Pt remained in recliner with all needs in reach and alarm on at end of session. RN aware.         EDUCATION PROVIDED  Patient Education : Role of Occupational  Therapy; Plan of Care; Functional Transfer Techniques; Fall Prevention; Posture/Positioning; Energy Conservation; Proper Body Mechanics; Other (compensatory ADL techniques)  Patient's Response to Education: Verbalized Understanding; Returned Demonstration  Family/Caregiver Education : Role of Occupational Therapy; Plan of Care  Family/Caregiver's Response to Education: Verbalized Understanding    The patient's Approx Degree of Impairment: 50.11% has been calculated based on documentation in the [facility] '6 clicks' Inpatient Daily Activity Short Form.  Research supports that patients with this level of impairment may benefit from rehab.  Final disposition will be made by interdisciplinary medical team.     Patient End of Session: Up in chair, Needs met, Call light within reach, RN aware of session/findings, All patient questions and concerns addressed, Hospital anti-slip socks, Alarm set, Family present    OT Goals  Patients self stated goal is: none stated      Patient will complete functional transfer with CGA  Comment:     Patient will complete toileting with CGA  Comment:     Patient will tolerate standing for 2 minutes in prep for adls with SBA   Comment:    Patient will complete item retrieval with SBA  Comment:          Goals  on: 25  Frequency: 3x/week    Patient Evaluation Complexity Level:   Occupational Profile/Medical History MODERATE - Expanded review of history including review of medical or therapy record   Specific performance deficits impacting engagement in ADL/IADL MODERATE  3 - 5 performance deficits   Client Assessment/Performance Deficits MODERATE - Comorbidities and min to mod modifications of tasks    Clinical Decision Making MODERATE - Analysis of occupational profile, detailed assessments, several treatment options    Overall Complexity MODERATE     OT Session Time: 30 minutes  Self-Care Home Management: 30 minutes

## 2025-07-02 NOTE — CONGREGATE LIVING REVIEW
[facility] Living Authorization    The [facility] Review Committee has reviewed this case and the patient IS APPROVED for discharge to a facility for Short Term Skilled once the following procedure is followed:     - The physician discharge instructions (contained within the [facility]END[facility] note for SNF) must inlcude the below appropriate and approved COVID instructions to the facility    For questions regarding CLRC approval process, please contact the CM assigned to the case.  For questions regarding RN discharge workflow, please contact the unit Clinical Leader.

## 2025-07-02 NOTE — PLAN OF CARE
Pt is alertx4, Wound dressing changed q 48hrs, Plan for possible rehab once medically clear for DC   Problem: Diabetes/Glucose Control  Goal: Glucose maintained within prescribed range  Description: INTERVENTIONS:  - Monitor Blood Glucose as ordered  - Assess for signs and symptoms of hyperglycemia and hypoglycemia  - Administer ordered medications to maintain glucose within target range  - Assess barriers to adequate nutritional intake and initiate nutrition consult as needed  - Instruct patient on self management of diabetes  Outcome: Progressing     Problem: PAIN - ADULT  Goal: Verbalizes/displays adequate comfort level or patient's stated pain goal  Description: INTERVENTIONS:  - Encourage pt to monitor pain and request assistance  - Assess pain using appropriate pain scale  - Administer analgesics based on type and severity of pain and evaluate response  - Implement non-pharmacological measures as appropriate and evaluate response  - Consider cultural and social influences on pain and pain management  - Manage/alleviate anxiety  - Utilize distraction and/or relaxation techniques  - Monitor for opioid side effects  - Notify MD/LIP if interventions unsuccessful or patient reports new pain  - Anticipate increased pain with activity and pre-medicate as appropriate  Outcome: Progressing     Problem: RISK FOR INFECTION - ADULT  Goal: Absence of fever/infection during anticipated neutropenic period  Description: INTERVENTIONS  - Monitor WBC  - Administer growth factors as ordered  - Implement neutropenic guidelines  Outcome: Progressing

## 2025-07-02 NOTE — CM/SW NOTE
08:00AM  Per chart, PT worked w/ pt yesterday and Anticipated therapy need: Gradual Rehabilitative Therapy    Tentative [facility] referrals sent in A[facility]. PAS[facility] completed/[facility]ed[facility].    SW to f/up w/ pt once list is available for discussion/review and as schedule permits.    01:00PM  SW met w/ pt at bedside and discussed anticipated therapy need for [facility]RIE[facility]A.   SW explained referral process and next steps for choice, ins auth, and med clearance. SW explained quality data and provided list for pt to review.    Pt confirmed she will review w/ her granddtr when she returns. Pt expressed understanding to call SW at phone # provided w/ any additional questions and/or choice.    SW sent message to [facility] [place] and requested insurance auth be submitted via [facility] (facility choice pending).    PLAN: [facility] - pending choice, ins auth, & med clear      SW/CM to remain available for support and/or discharge planning.       MS CamilleW, LSW [id number]

## 2025-07-02 NOTE — PLAN OF CARE
Pain managed w/ PRN [place]. SBA s/p onto rolling chair to bathroom. Slept well. Miconazole powder for abd fold skin irritation.  Problem: Diabetes/Glucose Control  Goal: Glucose maintained within prescribed range  Description: INTERVENTIONS:  - Monitor Blood Glucose as ordered  - Assess for signs and symptoms of hyperglycemia and hypoglycemia  - Administer ordered medications to maintain glucose within target range  - Assess barriers to adequate nutritional intake and initiate nutrition consult as needed  - Instruct patient on self management of diabetes  Outcome: Progressing     Problem: PAIN - ADULT  Goal: Verbalizes/displays adequate comfort level or patient's stated pain goal  Description: INTERVENTIONS:  - Encourage pt to monitor pain and request assistance  - Assess pain using appropriate pain scale  - Administer analgesics based on type and severity of pain and evaluate response  - Implement non-pharmacological measures as appropriate and evaluate response  - Consider cultural and social influences on pain and pain management  - Manage/alleviate anxiety  - Utilize distraction and/or relaxation techniques  - Monitor for opioid side effects  - Notify MD/LIP if interventions unsuccessful or patient reports new pain  - Anticipate increased pain with activity and pre-medicate as appropriate  Outcome: Progressing     Problem: SAFETY ADULT - FALL  Goal: Free from fall injury  Description: INTERVENTIONS:  - Assess pt frequently for physical needs  - Identify cognitive and physical deficits and behaviors that affect risk of falls.  - [place] fall precautions as indicated by assessment.  - Educate pt/family on patient safety including physical limitations  - Instruct pt to call for assistance with activity based on assessment  - Modify environment to reduce risk of injury  - Provide assistive devices as appropriate  - Consider OT/PT consult to assist with strengthening/mobility  - Encourage toileting  schedule  Outcome: Progressing     Problem: DISCHARGE PLANNING  Goal: Discharge to home or other facility with appropriate resources  Description: INTERVENTIONS:  - Identify barriers to discharge w/pt and caregiver  - Include patient/family/discharge partner in discharge planning  - Arrange for needed discharge resources and transportation as appropriate  - Identify discharge learning needs (meds, wound care, etc)  - Arrange for interpreters to assist at discharge as needed  - Consider post-discharge preferences of patient/family/discharge partner  - Complete POLST form as appropriate  - Assess patient's ability to be responsible for managing their own health  - Refer to Case Management Department for coordinating discharge planning if the patient needs post-hospital services based on physician/LIP order or complex needs related to functional status, cognitive ability or social support system  Outcome: Progressing     Problem: METABOLIC/FLUID AND ELECTROLYTES - ADULT  Goal: Glucose maintained within prescribed range  Description: INTERVENTIONS:  - Monitor Blood Glucose as ordered  - Assess for signs and symptoms of hyperglycemia and hypoglycemia  - Administer ordered medications to maintain glucose within target range  - Assess barriers to adequate nutritional intake and initiate nutrition consult as needed  - Instruct patient on self management of diabetes  Outcome: Progressing

## 2025-07-03 VITALS
BODY MASS INDEX: 30.71 KG/M2 | RESPIRATION RATE: 20 BRPM | HEART RATE: 81 BPM | SYSTOLIC BLOOD PRESSURE: 125 MMHG | DIASTOLIC BLOOD PRESSURE: 55 MMHG | HEIGHT: 65 IN | WEIGHT: 184.31 LBS | TEMPERATURE: 98 F | OXYGEN SATURATION: 94 %

## 2025-07-03 LAB
ANION GAP SERPL CALC-SCNC: 6 MMOL/L (ref 0–18)
BASOPHILS # BLD AUTO: 0.03 X10(3) UL (ref 0–0.2)
BASOPHILS NFR BLD AUTO: 0.4 %
BUN BLD-MCNC: 28 MG/DL (ref 9–23)
BUN/CREAT SERPL: 23.7 (ref 10–20)
CALCIUM BLD-MCNC: 8.8 MG/DL (ref 8.7–10.4)
CHLORIDE SERPL-SCNC: 108 MMOL/L (ref 98–112)
CO2 SERPL-SCNC: 28 MMOL/L (ref 21–32)
CREAT BLD-MCNC: 1.18 MG/DL (ref 0.55–1.02)
DEPRECATED RDW RBC AUTO: 53.7 FL (ref 35.1–46.3)
EGFRCR SERPLBLD CKD-EPI 2021: 49 ML/MIN/1.73M2 (ref 60–?)
EOSINOPHIL # BLD AUTO: 0.56 X10(3) UL (ref 0–0.7)
EOSINOPHIL NFR BLD AUTO: 7.8 %
ERYTHROCYTE [DISTWIDTH] IN BLOOD BY AUTOMATED COUNT: 16.2 % (ref 11–15)
GLUCOSE BLD-MCNC: 104 MG/DL (ref 70–99)
GLUCOSE BLDC GLUCOMTR-MCNC: 113 MG/DL (ref 70–99)
GLUCOSE BLDC GLUCOMTR-MCNC: 120 MG/DL (ref 70–99)
HCT VFR BLD AUTO: 24.7 % (ref 35–48)
HGB BLD-MCNC: 7.9 G/DL (ref 12–16)
IMM GRANULOCYTES # BLD AUTO: 0.12 X10(3) UL (ref 0–1)
IMM GRANULOCYTES NFR BLD: 1.7 %
LYMPHOCYTES # BLD AUTO: 1.46 X10(3) UL (ref 1–4)
LYMPHOCYTES NFR BLD AUTO: 20.4 %
MCH RBC QN AUTO: 28.8 PG (ref 26–34)
MCHC RBC AUTO-ENTMCNC: 32 G/DL (ref 31–37)
MCV RBC AUTO: 90.1 FL (ref 80–100)
MONOCYTES # BLD AUTO: 0.89 X10(3) UL (ref 0.1–1)
MONOCYTES NFR BLD AUTO: 12.5 %
NEUTROPHILS # BLD AUTO: 4.08 X10 (3) UL (ref 1.5–7.7)
NEUTROPHILS # BLD AUTO: 4.08 X10(3) UL (ref 1.5–7.7)
NEUTROPHILS NFR BLD AUTO: 57.2 %
OSMOLALITY SERPL CALC.SUM OF ELEC: 300 MOSM/KG (ref 275–295)
PLATELET # BLD AUTO: 301 10(3)UL (ref 150–450)
POTASSIUM SERPL-SCNC: 4.2 MMOL/L (ref 3.5–5.1)
POTASSIUM SERPL-SCNC: 4.2 MMOL/L (ref 3.5–5.1)
RBC # BLD AUTO: 2.74 X10(6)UL (ref 3.8–5.3)
SODIUM SERPL-SCNC: 142 MMOL/L (ref 136–145)
WBC # BLD AUTO: 7.1 X10(3) UL (ref 4–11)

## 2025-07-03 PROCEDURE — 99239 HOSP IP/OBS DSCHRG MGMT >30: CPT | Performed by: INTERNAL MEDICINE

## 2025-07-03 RX ORDER — HYDROCODONE BITARTRATE AND ACETAMINOPHEN 5; 325 MG/1; MG/1
1 TABLET ORAL EVERY 4 HOURS PRN
Qty: 15 TABLET | Refills: 0 | Status: SHIPPED | OUTPATIENT
Start: 2025-07-03 | End: 2025-07-08

## 2025-07-03 RX ADMIN — ASPIRIN 81 MG: 81 TABLET ORAL at 10:50:00

## 2025-07-03 RX ADMIN — ASCORBIC ACID 500 MG: 500 MG TABLET ORAL at 10:50:00

## 2025-07-03 RX ADMIN — FENOFIBRATE 134 MG: 134 CAPSULE ORAL at 10:58:00

## 2025-07-03 RX ADMIN — ALLOPURINOL 100 MG: 100 TABLET ORAL at 10:50:00

## 2025-07-03 RX ADMIN — PANTOPRAZOLE SODIUM 40 MG: 40 TABLET, DELAYED RELEASE ORAL at 04:15:00

## 2025-07-03 RX ADMIN — HYDROCODONE BITARTRATE AND ACETAMINOPHEN 2 TABLET: 5; 325 TABLET ORAL at 10:50:00

## 2025-07-03 RX ADMIN — HYDROCODONE BITARTRATE AND ACETAMINOPHEN 2 TABLET: 5; 325 TABLET ORAL at 15:11:00

## 2025-07-03 RX ADMIN — NICOTINE 1 PATCH: 21 MG/24HR PATCH, TRANSDERMAL 24 HOURS TRANSDERMAL at 10:53:00

## 2025-07-03 RX ADMIN — GABAPENTIN 800 MG: 400 CAPSULE ORAL at 10:50:00

## 2025-07-03 RX ADMIN — FLUTICASONE PROPIONATE AND SALMETEROL 1 PUFF: 250; 50 POWDER RESPIRATORY (INHALATION) at 10:51:00

## 2025-07-03 RX ADMIN — DOCUSATE SODIUM 100 MG: 100 CAPSULE, LIQUID FILLED ORAL at 10:50:00

## 2025-07-03 RX ADMIN — HYDROCODONE BITARTRATE AND ACETAMINOPHEN 2 TABLET: 5; 325 TABLET ORAL at 04:15:00

## 2025-07-03 NOTE — PHYSICAL THERAPY NOTE
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      Patient is requiring contact guard assist and minimal assist as a result of the following impairments: decreased functional strength, decreased endurance/aerobic capacity, impaired coordination, impaired motor planning, decreased muscular endurance, and medical status.     Patient continues to function below baseline with bed mobility, transfers, and gait.  Next session anticipate patient to progress bed mobility, transfers, and gait.  Physical Therapy will continue to follow patient for duration of hospitalization.    Patient continues to benefit from continued skilled PT services: to promote return to prior level of function and safety with continuous assistance and gradual rehabilitative therapy .    PLAN DURING HOSPITALIZATION  Nursing Mobility Recommendation : 1 Assist  PT Device Recommendation: Rolling walker  PT Treatment Plan: Bed mobility, Body mechanics, Energy conservation, Patient education, Gait training, Strengthening, Transfer training, Balance training  Frequency (Obs): Daily     SUBJECTIVE  I am so depressed    OBJECTIVE  Precautions: Cardiac, Limb alert - left (LLE faciotomy)    WEIGHT BEARING RESTRICTION  L  Lower Extremity: Weight Bearing as Tolerated      PAIN ASSESSMENT   Ratin  Location: LLE  Management Techniques: Activity promotion, Body mechanics, Repositioning    BALANCE  Static Sitting: Good  Dynamic Sitting: Fair +  Static Standing: Fair  Dynamic Standing: Poor +    ACTIVITY TOLERANCE                          O2 WALK       AM-PAC '6-Clicks' INPATIENT SHORT FORM - BASIC MOBILITY  How much difficulty does the patient currently have...  Patient Difficulty: Turning over in bed (including adjusting bedclothes, sheets and blankets)?: A Little   Patient Difficulty: Sitting down on and standing up from a chair with arms (e.g., wheelchair, bedside commode, etc.): A Little   Patient Difficulty: Moving from lying on back to sitting on the side of the bed?: A Little   How much help from another person does the patient currently need...   Help from Another: Moving to and from a bed to a chair (including a wheelchair)?: A Lot   Help from Another: Need to walk in hospital room?: A Lot   Help from Another: Climbing 3-5 steps with a railing?: Total     AM-PAC Score:  Raw Score: 14   Approx Degree of Impairment: 61.29%   Standardized Score (AM-PAC Scale): 38.1   CMS Modifier (G-Code): CL    FUNCTIONAL ABILITY STATUS  Pt amb 5 shffling steps    Rolling: contact guard assist  Supine to Sit: contact guard assist  Sit to Supine: minimal assist  Sit to Stand: mod A    Skilled Therapy Provided: RN approved PT session. Pt in supine and working on supine thera exs with ble's to improve functional ability. Pt in supine and stated she has a pain in b le's L >R. Pt with good sitting sulaiman on EOB, difficulty to stand up and placing weight on b feet . Pt taking only 5 shuffling steps to bed bedside chair. Pt left in chair with all needs in reach. Pt instructed on cont exs with ble's.      The patient's Approx Degree of Impairment: 61.29% has been calculated based on documentation in the [facility] '6 clicks' Inpatient Daily Activity Short  Form.  Research supports that patients with this level of impairment may benefit from rehab.  Final disposition will be made by interdisciplinary medical team.    THERAPEUTIC EXERCISES  Lower Extremity Ankle pumps  Glut sets  Hip AB/AD  Heel slides  Quad sets     Position Supine       Patient End of Session: Up in chair, Needs met, Call light within reach, Bracing education provided per handout, All patient questions and concerns addressed, Alarm set, Family present    CURRENT GOALS   Goals to be met by: 7/15/2025  Patient Goal Patient's self-stated goal is: Return home    Goal #1 Patient is able to demonstrate supine - sit EOB @ level: independent      Goal #1   Current Status  min A to CGA   Goal #2 Patient is able to demonstrate transfers Sit to/from Stand at assistance level: modified independent with walker - rolling      Goal #2  Current Status  mod A   Goal #3 Patient is able to ambulate 300 feet with assist device: walker - rolling at assistance level: modified independent   Goal #3   Current Status  5 shuffling steps   Goal #4 Patient will negotiate 5 stairs/one curb w/ assistive device and supervision   Goal #4   Current Status  NT   Goal #5 Patient to demonstrate independence with home activity/exercise instructions provided to patient in preparation for discharge.   Goal #5   Current Status  in progress   Goal #6     Goal #6  Current Status           Gait Trainin minutes  Therapeutic Activity: 12 minutes

## 2025-07-03 NOTE — PLAN OF CARE
Pt alert and oriented. Medically cleared for dc. IV removed early per pt request. Report given to Hermelindo at [facility]. Education given by LILIANA, RN. Son to transport patient to facility. Call light within reach. Safety precautions in place.   Problem: Patient Centered Care  Goal: Patient preferences are identified and integrated in the patient's plan of care  Description: Interventions:  - What would you like us to know as we care for you? She has a supportive family.  - Provide timely, complete, and accurate information to patient/family  - Incorporate patient and family knowledge, values, beliefs, and cultural backgrounds into the planning and delivery of care  - Encourage patient/family to participate in care and decision-making at the level they choose  - Honor patient and family perspectives and choices  Outcome: Completed     Problem: Diabetes/Glucose Control  Goal: Glucose maintained within prescribed range  Description: INTERVENTIONS:  - Monitor Blood Glucose as ordered  - Assess for signs and symptoms of hyperglycemia and hypoglycemia  - Administer ordered medications to maintain glucose within target range  - Assess barriers to adequate nutritional intake and initiate nutrition consult as needed  - Instruct patient on self management of diabetes  Outcome: Completed     Problem: Patient/Family Goals  Goal: Patient/Family Long Term Goal  Description: Patient's Long Term Goal: discharge from hospital     Interventions:  - Monitor site post procedure  - monitor vitals  - PT/OT  - Educate patient on post procedure protocol  - See additional Care Plan goals for specific interventions  Outcome: Completed  Goal: Patient/Family Short Term Goal  Description: Patient's Short Term Goal: improve pain     Interventions:   - monitor vital signs   - monitor blood glucose levels  - monitor appropriate labs  - pain management   - administer medications per order  - follow MD orders  - diagnostics per order  - update  and inform patient and family on plan of care  - See additional Care Plan goals for specific interventions  Outcome: Completed     Problem: PAIN - ADULT  Goal: Verbalizes/displays adequate comfort level or patient's stated pain goal  Description: INTERVENTIONS:  - Encourage pt to monitor pain and request assistance  - Assess pain using appropriate pain scale  - Administer analgesics based on type and severity of pain and evaluate response  - Implement non-pharmacological measures as appropriate and evaluate response  - Consider cultural and social influences on pain and pain management  - Manage/alleviate anxiety  - Utilize distraction and/or relaxation techniques  - Monitor for opioid side effects  - Notify MD/LIP if interventions unsuccessful or patient reports new pain  - Anticipate increased pain with activity and pre-medicate as appropriate  Outcome: Completed     Problem: RISK FOR INFECTION - ADULT  Goal: Absence of fever/infection during anticipated neutropenic period  Description: INTERVENTIONS  - Monitor WBC  - Administer growth factors as ordered  - Implement neutropenic guidelines  Outcome: Completed     Problem: SAFETY ADULT - FALL  Goal: Free from fall injury  Description: INTERVENTIONS:  - Assess pt frequently for physical needs  - Identify cognitive and physical deficits and behaviors that affect risk of falls.  - Epworth fall precautions as indicated by assessment.  - Educate pt/family on patient safety including physical limitations  - Instruct pt to call for assistance with activity based on assessment  - Modify environment to reduce risk of injury  - Provide assistive devices as appropriate  - Consider OT/PT consult to assist with strengthening/mobility  - Encourage toileting schedule  Outcome: Completed     Problem: DISCHARGE PLANNING  Goal: Discharge to home or other facility with appropriate resources  Description: INTERVENTIONS:  - Identify barriers to discharge w/pt and caregiver  - Include  patient/family/discharge partner in discharge planning  - Arrange for needed discharge resources and transportation as appropriate  - Identify discharge learning needs (meds, wound care, etc)  - Arrange for interpreters to assist at discharge as needed  - Consider post-discharge preferences of patient/family/discharge partner  - Complete POLST form as appropriate  - Assess patient's ability to be responsible for managing their own health  - Refer to Case Management Department for coordinating discharge planning if the patient needs post-hospital services based on physician/LIP order or complex needs related to functional status, cognitive ability or social support system  Outcome: Completed     Problem: RESPIRATORY - ADULT  Goal: Achieves optimal ventilation and oxygenation  Description: INTERVENTIONS:  - Assess for changes in respiratory status  - Assess for changes in mentation and behavior  - Position to facilitate oxygenation and minimize respiratory effort  - Oxygen supplementation based on oxygen saturation or ABGs  - Provide Smoking Cessation handout, if applicable  - Encourage broncho-pulmonary hygiene including cough, deep breathe, Incentive Spirometry  - Assess the need for suctioning and perform as needed  - Assess and instruct to report SOB or any respiratory difficulty  - Respiratory Therapy support as indicated  - Manage/alleviate anxiety  - Monitor for signs/symptoms of CO2 retention  Outcome: Completed     Problem: GASTROINTESTINAL - ADULT  Goal: Maintains or returns to baseline bowel function  Description: INTERVENTIONS:  - Assess bowel function  - Maintain adequate hydration with IV or PO as ordered and tolerated  - Evaluate effectiveness of GI medications  - Encourage mobilization and activity  - Obtain nutritional consult as needed  - Establish a toileting routine/schedule  - Consider collaborating with pharmacy to review patient's medication profile  Outcome: Completed     Problem: GENITOURINARY  - ADULT  Goal: Absence of urinary retention  Description: INTERVENTIONS:  - Assess patient’s ability to void and empty bladder  - Monitor intake/output and perform bladder scan as needed  - Follow urinary retention protocol/standard of care  - Consider collaborating with pharmacy to review patient's medication profile  - Implement strategies to promote bladder emptying  Outcome: Completed     Problem: METABOLIC/FLUID AND ELECTROLYTES - ADULT  Goal: Glucose maintained within prescribed range  Description: INTERVENTIONS:  - Monitor Blood Glucose as ordered  - Assess for signs and symptoms of hyperglycemia and hypoglycemia  - Administer ordered medications to maintain glucose within target range  - Assess barriers to adequate nutritional intake and initiate nutrition consult as needed  - Instruct patient on self management of diabetes  Outcome: Completed  Goal: Electrolytes maintained within normal limits  Description: INTERVENTIONS:  - Monitor labs and rhythm and assess patient for signs and symptoms of electrolyte imbalances  - Administer electrolyte replacement as ordered  - Monitor response to electrolyte replacements, including rhythm and repeat lab results as appropriate  - Fluid restriction as ordered  - Instruct patient on fluid and nutrition restrictions as appropriate  Outcome: Completed     Problem: SKIN/TISSUE INTEGRITY - ADULT  Goal: Incision(s), wounds(s) or drain site(s) healing without S/S of infection  Description: INTERVENTIONS:  - Assess and document risk factors for pressure ulcer development  - Assess and document skin integrity  - Assess and document dressing/incision, wound bed, drain sites and surrounding tissue  - Implement wound care per orders  - Initiate isolation precautions as appropriate  - Initiate Pressure Ulcer prevention bundle as indicated  Outcome: Completed     Problem: MUSCULOSKELETAL - ADULT  Goal: Return mobility to safest level of function  Description: INTERVENTIONS:  - Assess  patient stability and activity tolerance for standing, transferring and ambulating w/ or w/o assistive devices  - Assist with transfers and ambulation using safe patient handling equipment as needed  - Ensure adequate protection for wounds/incisions during mobilization  - Obtain PT/OT consults as needed  - Advance activity as appropriate  - Communicate ordered activity level and limitations with patient/family  Outcome: Completed     Problem: HEMATOLOGIC - ADULT  Goal: Free from bleeding injury  Description: (Example usage: patient with low platelets)  INTERVENTIONS:  - Avoid intramuscular injections, enemas and rectal medication administration  - Ensure safe mobilization of patient  - Hold pressure on venipuncture sites to achieve adequate hemostasis  - Assess for signs and symptoms of internal bleeding  - Monitor lab trends  - Patient is to report abnormal signs of bleeding to staff  - Avoid use of toothpicks and dental floss  - Use electric shaver for shaving  - Use soft bristle tooth brush  - Limit straining and forceful nose blowing  Outcome: Completed     Problem: Impaired Functional Mobility  Goal: Achieve highest/safest level of mobility/gait  Description: Interventions:  - Assess patient's functional ability and stability  - Promote increasing activity/tolerance for mobility and gait  - Educate and engage patient/family in tolerated activity level and precautions  - Recommend use of  RW for transfers and ambulation  - Recommend patient transfer to bedside chair toward strongest side  - When transferring patient, block weaker knee for safety  Outcome: Completed

## 2025-07-03 NOTE — CM/SW NOTE
Prior Authorization - Destination  Destination Type: Skilled nursing facility  Service Provider: [facility]  Payer Communication Destination Comments: Timmy [id number]  Prior Authorization Status: Approved  Prior Authorization Start Date: 07/03/25      Genet Tran DSC

## 2025-07-03 NOTE — PROGRESS NOTES
[facility]  part of [facility]     Vascular Surgery Progress Note    Mandi Moran Patient Status:  Inpatient    1955 MRN [id number]   Location [facility] 3W/SW Attending Lizzie Belle MD   Hosp Day # 6 PCP Johnny Westfall MD     Objective:   Temp: 98 °F (36.7 °C)  Pulse: 75  Resp: 20  BP: 136/55    Intake/Output:     Intake/Output Summary (Last 24 hours) at 7/3/2025 08  Last data filed at 7/3/2025 0411  Gross per 24 hour   Intake 440 ml   Output 750 ml   Net -310 ml         Events Yesterday/Overnight:  Doing well this morning.   Pain is well controlled.   Was able to stand up and walk for a longer period yesterday evening with PT support.   Labs this morning with Hgb 7.9 (slightly decreased compared to yesterday).     Exam:  Dressing on left lower leg is dry and intact.   Both feet are warm with strong DP signals bilaterally and PT signal on the left.     Impression/Plan:  Making good progress post-operatively.   The dressing on her left lower leg can be removed today. She can take a shower, and then apply new dressing to the area.  Must continue daily therapy with Aspirin 81 mg and Lovenox subcutaneously.   She can be discharged from vascular standpoint.   Will likely need rehab placement upon discharge.   Follow up with Dr. Najjar / Corby HENRIQUEZ in 2-3 weeks.     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       Medications:  Current Hospital Medications[1]    Laboratory/Data:    LABS:  Recent Labs   Lab 25  0400 25  0912 25  0357 25  1419 25  1117 25  0646   WBC 9.4 10.1 9.4  --  9.1 7.1   HGB 7.4* 7.2* 6.7* 8.0* 8.3* 7.9*   MCV 91.9 90.4 90.2  --  88.9 90.1   .0 189.0 185.0  --  293.0 301.0       Recent Labs   Lab 25  0400 25  0912 25  0357 25  1117 25  0646    137 140 140 142   K 4.3 3.8 4.1  4.1 3.8 4.2  4.2    107 107 105 108   CO2 27.0  26.0 27.0 26.0 28.0   BUN 16 15 21 23 28*   CREATSERUM 1.28* 1.23* 1.20* 1.27* 1.18*   * 140* 143* 158* 104*   CA 8.4* 8.9 8.8 9.3 8.8     No results for input(s): \"PTP\", \"INR\", \"PTT\" in the last 168 hours.  No results for input(s): \"ALT\", \"AST\", \"ALB\", \"AMYLASE\", \"LIPASE\", \"LDH\" in the last 168 hours.    Invalid input(s): \"ALPHOS\", \"TBIL\", \"DBIL\", \"TPROT\"  No results for input(s): \"TROP\" in the last 168 hours.  No results found for: \"ANAS\", \"BETTY\", \"ANASCRN\"  No results for input(s): \"PCACT\", \"PSACT\", \"AT3ACT\", \"HIPAB\", \"PATHI\", \"STALA\", \"DRVVTRATIO\", \"DRVVT\", \"STACLOT\", \"CARIGG\", \"S7WH9TRRFF\", \"D2EW2EQYKW\", \"RA\", \"HAVIGM\", \"HBCIGM\", \"HCVAB\", \"HBSAG\", \"HBCAB\", \"HBVDNAINTERP\", \"ANAS\", \"C3\", \"C4\" in the last 168 hours.     FLORENCE Watson  Division of Vascular Surgery.        [1]   Current Facility-Administered Medications   Medication Dose Route Frequency    miconazole 2 % powder   Topical BID PRN    sodium chloride 0.9% infusion   Intravenous Once    pantoprazole (Protonix) DR tab 40 mg  40 mg Oral QAM AC    cholecalciferol (Vitamin D3) tab 5,000 Units  5,000 Units Oral Daily    ascorbic acid (Vitamin C) tab 500 mg  500 mg Oral Daily    albuterol (Ventolin HFA) 108 (90 Base) MCG/ACT inhaler 1 puff  1 puff Inhalation Q4H PRN    amLODIPine (Norvasc) tab 10 mg  10 mg Oral Nightly    gabapentin (Neurontin) cap 800 mg  800 mg Oral BID    fluticasone-salmeterol (Advair Diskus) 250-50 MCG/ACT inhaler 1 puff  1 puff Inhalation BID    allopurinol (Zyloprim) tab 100 mg  100 mg Oral BID    aspirin DR tab 81 mg  81 mg Oral Daily    fenofibrate micronized (Lofibra) cap 134 mg  134 mg Oral Daily with breakfast    insulin degludec (Tresiba) 100 units/mL flextouch 10 Units  10 Units Subcutaneous Nightly    HYDROcodone-acetaminophen (Norco) 5-325 MG per tab 1 tablet  1 tablet Oral Q4H PRN    Or    HYDROcodone-acetaminophen (Norco) 5-325 MG per tab 2 tablet  2 tablet Oral Q4H PRN    ondansetron (Zofran) 4 MG/2ML  injection 4 mg  4 mg Intravenous Q6H PRN    metoclopramide (Reglan) 5 mg/mL injection 5 mg  5 mg Intravenous Q8H PRN    enoxaparin (Lovenox) 40 MG/0.4ML SUBQ injection 40 mg  40 mg Subcutaneous Nightly    insulin aspart (NovoLOG) 100 Units/mL FlexPen 1-7 Units  1-7 Units Subcutaneous TID CC and HS    ipratropium-albuterol (Duoneb) 0.5-2.5 (3) MG/3ML inhalation solution 3 mL  3 mL Nebulization Q6H PRN    acetaminophen (Tylenol) tab 650 mg  650 mg Oral Q6H PRN    HYDROmorphone (Dilaudid) 1 MG/ML injection 0.1 mg  0.1 mg Intravenous Q2H PRN    Or    HYDROmorphone (Dilaudid) 1 MG/ML injection 0.2 mg  0.2 mg Intravenous Q2H PRN    Or    HYDROmorphone (Dilaudid) 1 MG/ML injection 0.4 mg  0.4 mg Intravenous Q2H PRN    nicotine (Nicoderm CQ) 21 MG/24HR patch 1 patch  1 patch Transdermal Daily    docusate sodium (Colace) cap 100 mg  100 mg Oral BID    polyethylene glycol (PEG 3350) (Miralax) 17 g oral packet 17 g  17 g Oral Daily PRN    magnesium hydroxide (Milk of Magnesia) 400 MG/5ML oral suspension 30 mL  30 mL Oral Daily PRN    bisacodyl (Dulcolax) 10 MG rectal suppository 10 mg  10 mg Rectal Daily PRN    fleet enema (Fleet) rectal enema 133 mL  1 enema Rectal Once PRN

## 2025-07-03 NOTE — CM/SW NOTE
07/03/25 1300   Discharge disposition   Expected discharge disposition subacute   Post Acute Care Provider   ([facility])   Discharge transportation Private car     SW finally reached Admissions Director Kassi at [facility]. Confirmed she can accept pt leaving EM around 4PM.    RN Belia updated. SHRAVAN requested RN update pt and have pt contact her son w/ time. SW attempted pt's room and there was no answer.    Report phone #: [phone]      PLAN: [facility] GABRIELLA, son to transport around 4PM          RAJENDRA Obrien, JOSE LW [phone]

## 2025-07-03 NOTE — PLAN OF CARE
PRN dilaudid and Norco given for pain w/ ambulation. No other complaints overnight. SBA s/p to rolling chair.  Problem: Patient Centered Care  Goal: Patient preferences are identified and integrated in the patient's plan of care  Description: Interventions:  - What would you like us to know as we care for you? She has a supportive family.  - Provide timely, complete, and accurate information to patient/family  - Incorporate patient and family knowledge, values, beliefs, and cultural backgrounds into the planning and delivery of care  - Encourage patient/family to participate in care and decision-making at the level they choose  - Honor patient and family perspectives and choices  Outcome: Progressing     Problem: Diabetes/Glucose Control  Goal: Glucose maintained within prescribed range  Description: INTERVENTIONS:  - Monitor Blood Glucose as ordered  - Assess for signs and symptoms of hyperglycemia and hypoglycemia  - Administer ordered medications to maintain glucose within target range  - Assess barriers to adequate nutritional intake and initiate nutrition consult as needed  - Instruct patient on self management of diabetes  Outcome: Progressing     Problem: PAIN - ADULT  Goal: Verbalizes/displays adequate comfort level or patient's stated pain goal  Description: INTERVENTIONS:  - Encourage pt to monitor pain and request assistance  - Assess pain using appropriate pain scale  - Administer analgesics based on type and severity of pain and evaluate response  - Implement non-pharmacological measures as appropriate and evaluate response  - Consider cultural and social influences on pain and pain management  - Manage/alleviate anxiety  - Utilize distraction and/or relaxation techniques  - Monitor for opioid side effects  - Notify MD/LIP if interventions unsuccessful or patient reports new pain  - Anticipate increased pain with activity and pre-medicate as appropriate  Outcome: Progressing     Problem: SAFETY ADULT -  FALL  Goal: Free from fall injury  Description: INTERVENTIONS:  - Assess pt frequently for physical needs  - Identify cognitive and physical deficits and behaviors that affect risk of falls.  - Moon fall precautions as indicated by assessment.  - Educate pt/family on patient safety including physical limitations  - Instruct pt to call for assistance with activity based on assessment  - Modify environment to reduce risk of injury  - Provide assistive devices as appropriate  - Consider OT/PT consult to assist with strengthening/mobility  - Encourage toileting schedule  Outcome: Progressing     Problem: DISCHARGE PLANNING  Goal: Discharge to home or other facility with appropriate resources  Description: INTERVENTIONS:  - Identify barriers to discharge w/pt and caregiver  - Include patient/family/discharge partner in discharge planning  - Arrange for needed discharge resources and transportation as appropriate  - Identify discharge learning needs (meds, wound care, etc)  - Arrange for interpreters to assist at discharge as needed  - Consider post-discharge preferences of patient/family/discharge partner  - Complete POLST form as appropriate  - Assess patient's ability to be responsible for managing their own health  - Refer to Case Management Department for coordinating discharge planning if the patient needs post-hospital services based on physician/LIP order or complex needs related to functional status, cognitive ability or social support system  Outcome: Progressing     Problem: METABOLIC/FLUID AND ELECTROLYTES - ADULT  Goal: Glucose maintained within prescribed range  Description: INTERVENTIONS:  - Monitor Blood Glucose as ordered  - Assess for signs and symptoms of hyperglycemia and hypoglycemia  - Administer ordered medications to maintain glucose within target range  - Assess barriers to adequate nutritional intake and initiate nutrition consult as needed  - Instruct patient on self management of  diabetes  Outcome: Progressing

## 2025-07-03 NOTE — CM/SW NOTE
07/03/25 1003   Choice of Post-Acute Provider   Informed patient of right to choose their preferred provider Yes   List of appropriate post-acute services provided to patient/family with quality data Yes   Patient/family choice [facility]   Information given to Patient;Other (comment)  (dtr Twila)     10:00AM  SW met w/ pt and pt's granddtr Twila at bedside. Discussed [facility].   Per Twila, she called a couple facilities on the list and was informed by some they are OON w/ pt's insurance.    Pt's granddtr was waiting to hear from a couple more. SHRAVAN inquired about top choice IF in network.  Twila confirmed top choice is [facility].    SHRAVAN informed pt and granddtr that SW will f/up w/ [facility] and insurance to confirm in network. SW to update them of final response when confirmed.    Pt inquired about transportation to [facility] from [facility]. SW discussed [facility] and explained there would be an OOP cost. Pt is asking to have family transport her when cleared.    Pt's granddtr inquired about [facility] and testing for pt. SW explained calling phone # on the back of pt's insurance card to find in network providers.    DSC Genet notified of choice for [facility] and requested she update Insurance vi [employer] web portal.    [facility] notified via [facility] messenger they are choice. They have been reserved.    11:00AM  Received Vmail from dept phone line from [facility]/ [facility] requesting accepting facility.    SW contacted [facility] via phone #: [phone] and spoke to Rylee.    She confirmed their system is updated to show [facility] as choice and in network. Per Rylee, auth approved 7/3 w/ next review date 7/7. Per Rylee, Auth ID still needs to be generated but reference #: [id number] is able to be used.    Update sent to [facility] in [facility]. Pt updated via her room phone.     Pt confirmed her son will transport her when medically cleared and ready for  DC.    UPDATE: Pt has DC order. Pending time from facility for DC.    PLAN: [facility], wants family to transport - pending facility time      SW/CM to remain available for support and/or discharge planning.       RAJENDRA Obrien, LSW [phone]

## 2025-07-03 NOTE — DISCHARGE SUMMARY
[facility]  part of [facility]    Discharge Summary    Mandi Moran Patient Status:  Inpatient    1955 MRN [id number]   Location [facility] 3W/SW Attending Lizzei Belle MD   Hosp Day # 6 PCP Johnny Westfall MD     Date of Admission: 2025      Date of Discharge: No discharge date for patient encounter.  ***    Admitting Diagnosis: Infrarenal abdominal aortic aneurysm (AAA) without rupture [I71.43]    Hospital Discharge Diagnoses: {Enter hospital discharge diagnoses here (please select the wildcards and then replace with free text; this allows us to save this data discretely for reporting purposes:5961::\"***\"}    Lace+ Score: 53  59-90 High Risk  29-58 Medium Risk  0-28   Low Risk.    TCM Follow-Up Recommendation:  {Care Managers will evaluate the need for follow-up for all patients ages 50+, and high/moderate risk patients ages 25-49. Low risk patients (LACE < 29) will only be evaluated if the \"Still recommend for TCM follow-up\" option is selected from this list.:[id number]}          Problem List: Problem List[1]      Physical Exam: ***    History of Present Illness: ***    Hospital Course: ***    Discharge Condition: {DISCHARGE CONDITION:}    Discharge Medications:      Discharge Medications        START taking these medications        Instructions Prescription details   HYDROcodone-acetaminophen 5-325 MG Tabs  Commonly known as: Norco      Take 1 tablet by mouth every 4 (four) hours as needed for Pain.   Stop taking on: 2025  Quantity: 15 tablet  Refills: 0            CHANGE how you take these medications        Instructions Prescription details   gabapentin 400 MG Caps  Commonly known as: Neurontin  What changed: additional instructions      Gabapentin 800 mg in the morning, 400 mg in the afternoon, 800 mg at night   Quantity: 360 capsule  Refills: 3            CONTINUE taking these medications        Instructions Prescription details   albuterol 108 (90  Base) MCG/ACT Aers  Commonly known as: Ventolin HFA      every 28 days. FOR 21 DAYS IN, THEN REMOVE FOR 7 DAYS   Refills: 0     allopurinol 100 MG Tabs  Commonly known as: Zyloprim      Allopurinol 200 mg in the morning and 100 mg at night   Quantity: 270 tablet  Refills: 3     amLODIPine 10 MG Tabs  Commonly known as: Norvasc      Take 1 tablet (10 mg total) by mouth at bedtime.   Refills: 0     aspirin 81 MG Tbec  Commonly known as: Aspirin 81      Take 1 tablet (81 mg total) by mouth daily.   Quantity: 90 tablet  Refills: 0     BD Pen Needle Mini U/F 31G X 5 MM Misc  Generic drug: Insulin Pen Needle      1 kit 2 (two) times daily.   Refills: 0     cholecalciferol 125 MCG (5000 UT) Caps  Commonly known as: Vitamin D3      Take 1 capsule (5,000 Units total) by mouth daily.   Refills: 0     fenofibrate 145 MG Tabs  Commonly known as: Tricor      Take 1 tablet (145 mg total) by mouth daily.   Quantity: 90 tablet  Refills: 1     fluticasone-salmeterol 250-50 MCG/ACT Aepb  Commonly known as: Advair Diskus      Inhale 1 puff into the lungs 2 (two) times daily.   Quantity: 60 each  Refills: 3     Lantus SoloStar 100 UNIT/ML Sopn  Generic drug: insulin glargine      Inject 10 Units into the skin nightly.   Quantity: 15 mL  Refills: 3     MAGNESIUM GLYCINATE OR      Take by mouth at bedtime.   Refills: 0     metFORMIN HCl 1000 MG Tabs  Commonly known as: GLUCOPHAGE      Take 1 tablet (1,000 mg total) by mouth 2 (two) times daily with meals.   Quantity: 180 tablet  Refills: 1     OneTouch Delica Plus [id number] Misc      Apply 1 Application topically 2 (two) times daily.   Refills: 0     PA B-COMPLEX WITH B-12 OR       Refills: 0     pantoprazole 40 MG Tbec  Commonly known as: Protonix       Refills: 0     Tylenol Extra Strength 500 MG Tabs  Generic drug: acetaminophen      Take 4 tablets (2,000 mg total) by mouth in the morning and 4 tablets (2,000 mg total) before bedtime.   Refills: 0     vitamin C 1000 MG Tabs      Take  0.5 tablets (500 mg total) by mouth daily.   Refills: 0               Where to Get Your Medications        These medications were [id number] [place] [facility] [address] AT [facility] [place], [phone], [phone]  [address], [facility] [address]      Hours: 24-hours Phone: [phone]   HYDROcodone-acetaminophen 5-325 MG Tabs             Lizzie Belle MD  7/3/2025  2:30 PM    Greater than 30 minutes spent on preparation and coordination of this discharge       [1]   Patient Active Problem List  Diagnosis    Abdominal discomfort    Type 2 diabetes mellitus with stage 3a chronic kidney disease, without long-term current use of insulin (HCC)    Hyperlipidemia    Primary hypertension    Stage 3a chronic kidney disease (HCC)    Chronic obstructive pulmonary disease (HCC)    Chronic gout without tophus    Cigarette smoker    Health maintenance examination    Spinal stenosis of lumbar region with neurogenic claudication    Abdominal aortic aneurysm (AAA) without rupture    Statin intolerance    Bilateral hearing loss    Preop testing    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

## 2025-07-07 NOTE — PAYOR COMM NOTE
--------------  DISCHARGE REVIEW    Payor: [employer]ANGO [facility]  Subscriber #:  [id number]  Authorization Number: [id number]    Admit date: 6/27/25  Admit time:   7:50 AM  Discharge Date: 7/3/2025  5:04 PM

## 2025-07-08 ENCOUNTER — TELEPHONE (OUTPATIENT)
Facility: CLINIC | Age: 70
End: 2025-07-08

## 2025-07-08 NOTE — TELEPHONE ENCOUNTER
Amy from [facility] states patient had surgery on left lower leg with sutures but she does not see any schedule about when the sutures will be removed please call requesting to speak to Rn

## 2025-07-08 NOTE — TELEPHONE ENCOUNTER
Returned call and contact made at 7-8-2025.  Confirmed the patient has po appt 7- at 4:00.  Sutures will be taken out o that date if they are ready.    Junie

## 2025-07-16 ENCOUNTER — TELEPHONE (OUTPATIENT)
Facility: CLINIC | Age: 70
End: 2025-07-16

## 2025-07-16 NOTE — TELEPHONE ENCOUNTER
Called patient back to see if patient can come in on July 18, 2025 at 11:45 AM for the post op. If patient calls back please transfer to [place] at [phone].

## 2025-07-16 NOTE — TELEPHONE ENCOUNTER
Left voicemail that Corby Bailon NP will not be in clinic tomorrow and we need to reschedule appointment to next Thursday

## 2025-07-16 NOTE — TELEPHONE ENCOUNTER
Patient returning call - wants to be seen sooner than next week regarding stitches removal.  Please call.  Thank you.

## 2025-07-16 NOTE — TELEPHONE ENCOUNTER
Patient states that she is still in rehab facility and will be discharged around 2pm tomorrow.  Please call.    Attempted to transfer/no answer.

## 2025-07-17 ENCOUNTER — TELEPHONE (OUTPATIENT)
Dept: INTERNAL MEDICINE CLINIC | Facility: CLINIC | Age: 70
End: 2025-07-17

## 2025-07-17 NOTE — TELEPHONE ENCOUNTER
Samia  from [facility] called the office.    She needs a verbal okay if Dr. Westfall will sign home health orders for this patient.  Will be seen for nursing, PT, OT, speech therapy and a .    Call back number is [phone]. The voicemail box is confidential.

## 2025-07-17 NOTE — TELEPHONE ENCOUNTER
Patient had abdominal aortic aneurysm repair and left thrombectomy with femoral endarterectomy on 6/27/25.      Dr. Westfall sign orders for nursing, PT, OT, Speech Therapy, and SW?

## 2025-07-18 ENCOUNTER — OFFICE VISIT (OUTPATIENT)
Facility: CLINIC | Age: 70
End: 2025-07-18

## 2025-07-18 VITALS — DIASTOLIC BLOOD PRESSURE: 80 MMHG | SYSTOLIC BLOOD PRESSURE: 114 MMHG

## 2025-07-18 DIAGNOSIS — R60.0 LEG EDEMA, LEFT: ICD-10-CM

## 2025-07-18 DIAGNOSIS — I71.43 INFRARENAL ABDOMINAL AORTIC ANEURYSM (AAA) WITHOUT RUPTURE: Primary | ICD-10-CM

## 2025-07-20 NOTE — PROGRESS NOTES
Samer F. Najjar, MD  Vascular Surgery  [facility]         VASCULAR SURGERY OFFICE NOTE         Name: Mandi Moran   : 1955  [id number]     REASON FOR VISIT:   Patient is a 70 year old female who is here for her post op visit s/p endovascular abdominal aortic aneurysm repair with the [place] Excluder endograft with percutaneous access on the right.  The patient had significant bilateral common femoral disease that required femoral artery cutdown on the left with thrombectomy and femoral endarterectomy.  The following day, she developed embolization to the right lower extremity requiring a popliteal artery thrombectomy via calf incision.and severe pain in her left calf that required a left lower extremity 4 compartment fasciotomy and evacuation of hematoma.  She had also developed a right popliteal artery thrombus   The fasciotomy incisions were closed.   Has done well at the nursing home.   Initially, she developed severe edema which complicated her wounds and her walking was very difficult but now she has made significant progress.  She did not develop a dropfoot.  She did undergo a CT angiogram while in the hospital that was negative for endoleak.    PAST MEDICAL HISTORY:   Past Medical History[1]    PAST SURGICAL HISTORY:   Past Surgical History[2]     MEDICATIONS:   Current Medications[3]    EXAM:   /80   The left groin incision is fully healed.  The sutures from the fasciotomy incisions were removed.  She still has moderate edema involving her left lower extremity.  Both feet are warm and well-perfused.     ASSESSMENT    There are no diagnoses linked to this encounter.    The patient is s/p endovascular abdominal aortic aneurysm repair that was complicated by bilateral lower extremity ischemia involving the left common femoral artery and the right popliteal artery.  Both lower extremities are well-perfused now.  The medial fasciotomy incision is healing well but she still has  significant edema.  The sutures were removed and Steri-Strips were placed but she will require compression therapy in order to decrease the edema present.  We would like to see her back in few days to remove the compression.  Overall she is doing well.      PLAN:       Follow-up in 2 days for management of her compression therapy.  She does not require a post procedure CT angiogram because this was done at the hospital.  We will follow that with an ultrasound in 6 months        Samer F. Najjar MD  Division of Vascular Surgery       [1]   Past Medical History:   Arthritis    Back problem    COPD (chronic obstructive pulmonary disease) (HCC)    Depression    Diabetes (HCC)    Esophageal reflux    Essential hypertension    Gout    High blood pressure    High cholesterol    Hyperlipidemia    Incontinence    Neuropathy    Osteoarthritis    Peripheral vascular disease    Visual impairment   [2]   Past Surgical History:  Procedure Laterality Date          Cholecystectomy      Colonoscopy      Endovas aaa repr w/3-p part  2025    1. Endovascular abdominal aortic aneurysm repair with the [place] Excluder endograft 2. Percutaneous access on the right 3. Femoral artery cutdown on the left with thrombectomy and femoral endarterectomy    Laminectomy  2024    Laminotomy with decompression of nerve roots, including partial facetectomy, foraminotomy, and excision of herniated intervertebral disc    Other surgical history Left 2025    1. Left lower extremity 4 compartment fasciotomy and evacuation of the hematoma 2. Right popliteal artery thrombectomy via calf incision    Upper gi endoscopy,exam     [3]   Current Outpatient Medications   Medication Sig Dispense Refill    MAGNESIUM GLYCINATE OR Take by mouth at bedtime.      gabapentin 400 MG Oral Cap Gabapentin 800 mg in the morning, 400 mg in the afternoon, 800 mg at night (Patient taking differently: Gabapentin 800 mg in the morning, 400 mg as needed in the  afternoon and 400 mg-800 mg at bedtime) 360 capsule 3    fenofibrate 145 MG Oral Tab Take 1 tablet (145 mg total) by mouth daily. 90 tablet 1    fluticasone-salmeterol 250-50 MCG/ACT Inhalation Aerosol Powder, Breath Activated Inhale 1 puff into the lungs 2 (two) times daily. 60 each 3    insulin glargine (LANTUS SOLOSTAR) 100 UNIT/ML Subcutaneous Solution Pen-injector Inject 10 Units into the skin nightly. 15 mL 3    allopurinol 100 MG Oral Tab Allopurinol 200 mg in the morning and 100 mg at night 270 tablet 3    aspirin (ASPIRIN 81) 81 MG Oral Tab EC Take 1 tablet (81 mg total) by mouth daily. 90 tablet 0    metFORMIN HCl 1000 MG Oral Tab Take 1 tablet (1,000 mg total) by mouth 2 (two) times daily with meals. 180 tablet 1    amLODIPine 10 MG Oral Tab Take 1 tablet (10 mg total) by mouth at bedtime.      BD PEN NEEDLE MINI U/F 31G X 5 MM Does not apply Misc 1 kit 2 (two) times daily.      Lancets (ONETOUCH DELICA PLUS [id number]) Does not apply Misc Apply 1 Application topically 2 (two) times daily.      pantoprazole 40 MG Oral Tab EC       cholecalciferol 125 MCG (5000 UT) Oral Cap Take 1 capsule (5,000 Units total) by mouth daily.      Ascorbic Acid (VITAMIN C) 1000 MG Oral Tab Take 0.5 tablets (500 mg total) by mouth daily.      B Complex Vitamins (PA B-COMPLEX WITH B-12 OR)       albuterol 108 (90 Base) MCG/ACT Inhalation Aero Soln every 28 days. FOR 21 DAYS IN, THEN REMOVE FOR 7 DAYS      acetaminophen (TYLENOL EXTRA STRENGTH) 500 MG Oral Tab Take 4 tablets (2,000 mg total) by mouth in the morning and 4 tablets (2,000 mg total) before bedtime.

## 2025-07-21 ENCOUNTER — OFFICE VISIT (OUTPATIENT)
Facility: CLINIC | Age: 70
End: 2025-07-21

## 2025-07-21 VITALS
DIASTOLIC BLOOD PRESSURE: 64 MMHG | WEIGHT: 180 LBS | SYSTOLIC BLOOD PRESSURE: 118 MMHG | HEIGHT: 65 IN | BODY MASS INDEX: 29.99 KG/M2

## 2025-07-21 DIAGNOSIS — L97.821 ULCER OF SHIN, LEFT, LIMITED TO BREAKDOWN OF SKIN (HCC): ICD-10-CM

## 2025-07-21 DIAGNOSIS — R60.0 LEG EDEMA, LEFT: Primary | ICD-10-CM

## 2025-07-21 DIAGNOSIS — T81.31XD DEHISCENCE OF OPERATIVE WOUND, SUBSEQUENT ENCOUNTER: ICD-10-CM

## 2025-07-21 PROBLEM — T81.31XA RUPTURE OF OPERATION WOUND: Status: ACTIVE | Noted: 2025-07-21

## 2025-07-21 NOTE — TELEPHONE ENCOUNTER
[facility] calling again.  They need a verbal okay if Dr. Westfall will follow for home health.    Confidential voicemail - [phone].

## 2025-07-21 NOTE — TELEPHONE ENCOUNTER
Spoke with Dayanara at [facility]. Dr. Westfall has agreed to nursing, PT, OT, Speech Therapy, and SW.     Abdominal Pain, N/V/D Abdominal Pain Abdominal pain

## 2025-07-21 NOTE — PROGRESS NOTES
Subjective   Mandi Moran is a 70 year old female.    Chief Complaint   Patient presents with    Follow - Up     The patient is here for follow up Left Leg Fasciotomy and Right popliteal artery thrombectomy calf incision and wound care on 6-.      HPI  7/21/2025: Patient is a 70 year old female with T2DM, COPD, HTN who had endovascular abdominal aortic aneurysm repair with the [place] Excluder endograft with percutaneous access on the right.  The patient had significant bilateral common femoral disease that required femoral artery cutdown on the left with thrombectomy and femoral endarterectomy.  The following day, she developed embolization to the right lower extremity requiring a popliteal artery thrombectomy via calf incision.and severe pain in her left calf that required a left lower extremity 4 compartment fasciotomy and evacuation of hematoma.  She had also developed a right popliteal artery thrombus   The fasciotomy incisions were closed. She was discharged to SNF, she has been discharged home last week. She developed significant edema, leading to surgical wound dehiscent, superficial. She did not develop a dropfoot. She did undergo a CT angiogram while in the hospital that was negative for endoleak. She was here in our office, the sutures were removed. She was treated with advanced dressing and compression therapy. Patient stated she is able to walk more with her walker.     Review of Systems  A 12 point review of systems with pertinent positives and negatives listed in the HPI.   Objective   Physical Exam  Constitutional:       Appearance: Normal appearance.  HENT:      Head: Normocephalic.   Cardiovascular:      Rate and Rhythm: Normal rate.      Pulses: Normal pulses.   Pulmonary:      Effort: Pulmonary effort is normal.   Abdominal:      General: Bowel sounds are normal.   Musculoskeletal:      Cervical back: Normal range of motion.      Right lower leg: Edema present.      Left lower leg: Edema  present. Left leg open wounds along the incision line.  Skin:     General: Skin is warm and dry.      Capillary Refill: Capillary refill takes 2 to 3 seconds.      Findings: No erythema or rash.   Neurological:      Mental Status: She is alert and oriented to person, place, and time.      Sensory: Sensory deficit present.   Psychiatric:         Mood and Affect: Mood normal.         Behavior: Behavior normal.     Vital Signs    07/21/25 1458   BP: 118/64     Allergies  Allergies[1]    Assessment     Anatomical location left medial lower leg  Wound Type Surgical wound   Wound Measurements Length: 14 cm; Width: 0.5 cm; Depth: 0.2 cm; No Undermining, no Tunneling.  Full thickness  Wound Bed Appearance (%): Granulation: 80%; Slough: 20 %; Exposed structures:none,  Wound Edges: Attached, Rolled/epibole  Periwound Skin: Intact,  no Redness; Edema (improved)  Exudate (Drainage): Amount:small  Type: Serous  Odor: None   Signs of Infection: no Pain, Redness >2 cm, Purulent drainage,  Swelling improved, no Warmth; no Fever/systemic symptoms      Anatomical location left lateral lower leg  Wound Type Surgical wound   Wound Measurements Length:0 cm; Width: 0 cm; Depth: 0 cm; No Undermining, no Tunneling.  Wound Bed Appearance (%): Epithelialization 0%; Exposed structures:none,  Wound Edges: Attached,  Periwound Skin: Intact,  no Redness; Edema (improved)  Exudate (Drainage): Amount: none  Odor: None   Signs of Infection: no Pain, Redness >2 cm, Purulent drainage,  Swelling improved, no Warmth; no Fever/systemic symptoms      Wound Care Provided  ] Cleanser used: Vashe solution, Debridement performed (type):Medi-honey gaze and xeroform  Dressing type(s): mechanical   Offloading device / Compression: two layer compression wraps   Patient tolerance: Good  Patient Education: Wound care instructions. Nutrition for wound healing discussed. Signs of infection reviewed   Plan / Follow-Up:  Dressing change frequency: weekly  Encounter  Diagnosis  1. Leg edema, left    2. Ulcer of shin, left, limited to breakdown of skin (HCC)    3. Dehiscence of operative wound, subsequent encounter      Problem List  Problem List[2]    Plan  Orders  Orders Placed This Encounter   Procedures    OP Wound Dressing   Continue walking as tolerated as often she can.   Recommended leg elevation when at home.  Continue keep the compression wraps clean, and dressing in place. Anticipated wound closure in 2-3 weeks, her right leg incision line approximated, the left lateral lower leg incision line is approximated as well. The meidal lower leg incision line needs to close.   Recommended nutrition for wound healing, increase protein intake with multi vitamin supplement, reduced salt in take.   Discussed treatment plan and wound care with patient, recommended to see PCP and or pain Management services for long term pain management.   Follow-Up  Return in about 1 week (around 7/28/2025) for APN visit 30 min.              [1]   Allergies  Allergen Reactions    Atorvastatin OTHER (SEE COMMENTS)     Muscle aches    Nickel RASH   [2]   Patient Active Problem List  Diagnosis    Abdominal discomfort    Type 2 diabetes mellitus with stage 3a chronic kidney disease, without long-term current use of insulin (HCC)    Hyperlipidemia    Primary hypertension    Stage 3a chronic kidney disease (HCC)    Chronic obstructive pulmonary disease (HCC)    Chronic gout without tophus    Cigarette smoker    Health maintenance examination    Spinal stenosis of lumbar region with neurogenic claudication    Abdominal aortic aneurysm (AAA) without rupture    Statin intolerance    Bilateral hearing loss    Preop testing    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

## 2025-07-23 ENCOUNTER — TELEPHONE (OUTPATIENT)
Dept: INTERNAL MEDICINE CLINIC | Facility: CLINIC | Age: 70
End: 2025-07-23

## 2025-07-23 DIAGNOSIS — I71.40 ABDOMINAL AORTIC ANEURYSM (AAA) WITHOUT RUPTURE, UNSPECIFIED PART: Primary | ICD-10-CM

## 2025-07-23 NOTE — TELEPHONE ENCOUNTER
Patient has an upcoming appointment on Monday, 7/28 with Dr. Meliton Perez.    She needs her referral updated for another office visit.    Patient said she had surgery on 6/27 and emergency surgery on 6/28, as her leg swelled up after having vascular surgery.    Patient was in the ICU for 8 days and then rehab.

## 2025-07-25 DIAGNOSIS — I71.40 ABDOMINAL AORTIC ANEURYSM (AAA) WITHOUT RUPTURE, UNSPECIFIED PART: ICD-10-CM

## 2025-07-26 RX ORDER — ASPIRIN 81 MG/1
81 TABLET, COATED ORAL DAILY
Qty: 90 TABLET | Refills: 0 | Status: SHIPPED | OUTPATIENT
Start: 2025-07-26

## 2025-07-26 NOTE — TELEPHONE ENCOUNTER
Refill passed per [facility] protocol.     Requested Prescriptions   Pending Prescriptions Disp Refills    ASPIRIN LOW DOSE 81 MG Oral Tab EC [Pharmacy Med Name: ASPIRIN EC 81 MG TABLET] 90 tablet 0     Sig: TAKE 1 TABLET BY MOUTH EVERY DAY       Aspirin Protocol Passed - 7/26/2025  8:37 AM        Passed - In person appointment or virtual visit in the past 6 mos or appointment in next 3 mos     Recent Outpatient Visits              5 days ago Leg edema, left    [facility] Richie Perez APRN    Office Visit    1 week ago Infrarenal abdominal aortic aneurysm (AAA) without rupture    [facility] Najjar, Samer F, MD    Office Visit    1 month ago Preop testing    [facility]    Nurse Only    1 month ago Preop testing    [facility]    Nurse Only    2 months ago Infrarenal abdominal aortic aneurysm (AAA) without rupture    [facility] Najjar, Samer F, MD    Office Visit          Future Appointments         Provider Department Appt Notes    In 2 days Richie Perez APRN [facility] 1 week f/u  *Auth visits 1/2    In 3 days Johnyn Westfall MD [facility]                     Passed - Medication is active on med list              Future Appointments         Provider Department Appt Notes    In 2 days Richie Perez APRN [facility] 1 week f/u  *Auth visits 1/2    In 3 days Johnny Westfall MD [facility]              Recent Outpatient Visits              5 days ago Leg edema, left    [facility] Richie Perez APRN    Office Visit    1 week ago Infrarenal abdominal aortic aneurysm (AAA) without rupture    [facility] Najjar, Samer F, MD    Office Visit    1 month ago Preop testing    [facility] [place]    Nurse Only    1 month ago Preop testing    [facility] [place]    Nurse Only    2 months ago Infrarenal abdominal aortic aneurysm (AAA) without rupture    [facility] Najjar, Samer F, MD    Office Visit

## 2025-07-28 ENCOUNTER — OFFICE VISIT (OUTPATIENT)
Facility: CLINIC | Age: 70
End: 2025-07-28

## 2025-07-28 VITALS
BODY MASS INDEX: 31 KG/M2 | RESPIRATION RATE: 16 BRPM | HEART RATE: 93 BPM | TEMPERATURE: 99 F | DIASTOLIC BLOOD PRESSURE: 56 MMHG | WEIGHT: 183.38 LBS | OXYGEN SATURATION: 98 % | SYSTOLIC BLOOD PRESSURE: 144 MMHG

## 2025-07-28 DIAGNOSIS — R60.0 LEG EDEMA, LEFT: Primary | ICD-10-CM

## 2025-07-28 DIAGNOSIS — T81.31XD DEHISCENCE OF OPERATIVE WOUND, SUBSEQUENT ENCOUNTER: ICD-10-CM

## 2025-07-28 DIAGNOSIS — L97.821 ULCER OF SHIN, LEFT, LIMITED TO BREAKDOWN OF SKIN (HCC): ICD-10-CM

## 2025-07-28 RX ORDER — HYDROCODONE BITARTRATE AND ACETAMINOPHEN 5; 325 MG/1; MG/1
1 TABLET ORAL EVERY 4 HOURS PRN
COMMUNITY
Start: 2025-07-14

## 2025-07-28 NOTE — PROGRESS NOTES
Subjective   Mandi Moran is a 70 year old female.    Chief Complaint   Patient presents with    Follow - Up     1-week follow-up     HPI  7/28/2025: Patient stated she has been walking at home with her walked, did some gardening last weekend, she is happy that she has been doing more since vascular procedure.    7/21/2025: Patient is a 70 year old female with T2DM, COPD, HTN who had endovascular abdominal aortic aneurysm repair with the [place] Excluder endograft with percutaneous access on the right.  The patient had significant bilateral common femoral disease that required femoral artery cutdown on the left with thrombectomy and femoral endarterectomy.  The following day, she developed embolization to the right lower extremity requiring a popliteal artery thrombectomy via calf incision.and severe pain in her left calf that required a left lower extremity 4 compartment fasciotomy and evacuation of hematoma.  She had also developed a right popliteal artery thrombus   The fasciotomy incisions were closed. She was discharged to SNF, she has been discharged home last week. She developed significant edema, leading to surgical wound dehiscent, superficial. She did not develop a dropfoot. She did undergo a CT angiogram while in the hospital that was negative for endoleak. She was here in our office, the sutures were removed. She was treated with advanced dressing and compression therapy. Patient stated she is able to walk more with her walker.     Review of Systems  A 12 point review of systems with pertinent positives and negatives listed in the HPI.   Objective     Physical Exam  Constitutional:       Appearance: Normal appearance.  HENT:      Head: Normocephalic.   Cardiovascular:      Rate and Rhythm: Normal rate.      Pulses: Normal pulses.   Pulmonary:      Effort: Pulmonary effort is normal.   Abdominal:      General: Bowel sounds are normal.   Musculoskeletal:      Cervical back: Normal range of motion.       Right lower leg: Edema present.      Left lower leg: Edema present. Left leg open wounds along the incision line.  Skin:     General: Skin is warm and dry.      Capillary Refill: Capillary refill takes 2 to 3 seconds.      Findings: No erythema or rash.   Neurological:      Mental Status: She is alert and oriented to person, place, and time.      Sensory: Sensory deficit present.   Psychiatric:         Mood and Affect: Mood normal.         Behavior: Behavior normal.   Wound Assessment  Lateral and medial leg incision line closed with secondary intention, no open wound, edema significantly reduced, no erythema in the [place] wound.     Vital Signs    07/28/25 1546   BP: 144/56   Pulse: 93   Resp: 16   Temp: 98.5 °F (36.9 °C)   Allergies  Allergies[1]    Assessment   The lateral and medial left lower leg incision line have closed, no open wound. Patient is very pleased with significant pain reduction and ability to walk without pain since the vascular intervention.     Encounter Diagnosis  1. Leg edema, left    2. Ulcer of shin, left, limited to breakdown of skin (HCC)    3. Dehiscence of operative wound, subsequent encounter      Problem List  Problem List[2]  Plan  Orders  Continue walking as tolerated as often she can.   Recommended leg elevation when at home.  Continue with light compression (tubular compression 10 mmHg) to support the incision line and prevent pressure on newly closed skin with sudden increase in lower leg edema.  Patient agreed to these plan.    Follow-Up  Return Follow up as needed..              [1]   Allergies  Allergen Reactions    Atorvastatin OTHER (SEE COMMENTS)     Muscle aches    Nickel RASH   [2]   Patient Active Problem List  Diagnosis    Abdominal discomfort    Type 2 diabetes mellitus with stage 3a chronic kidney disease, without long-term current use of insulin (HCC)    Hyperlipidemia    Primary hypertension    Stage 3a chronic kidney disease (HCC)    Chronic obstructive pulmonary  disease (HCC)    Chronic gout without tophus    Cigarette smoker    Health maintenance examination    Spinal stenosis of lumbar region with neurogenic claudication    Abdominal aortic aneurysm (AAA) without rupture    Statin intolerance    Bilateral hearing loss    Preop testing    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

## 2025-07-29 ENCOUNTER — OFFICE VISIT (OUTPATIENT)
Dept: INTERNAL MEDICINE CLINIC | Facility: CLINIC | Age: 70
End: 2025-07-29
Payer: MEDICARE

## 2025-07-29 VITALS
HEIGHT: 65 IN | WEIGHT: 179 LBS | DIASTOLIC BLOOD PRESSURE: 70 MMHG | OXYGEN SATURATION: 98 % | HEART RATE: 90 BPM | TEMPERATURE: 97 F | SYSTOLIC BLOOD PRESSURE: 136 MMHG | BODY MASS INDEX: 29.82 KG/M2

## 2025-07-29 DIAGNOSIS — N18.31 TYPE 2 DIABETES MELLITUS WITH STAGE 3A CHRONIC KIDNEY DISEASE, WITHOUT LONG-TERM CURRENT USE OF INSULIN (HCC): ICD-10-CM

## 2025-07-29 DIAGNOSIS — E11.22 TYPE 2 DIABETES MELLITUS WITH STAGE 3A CHRONIC KIDNEY DISEASE, WITHOUT LONG-TERM CURRENT USE OF INSULIN (HCC): ICD-10-CM

## 2025-07-29 DIAGNOSIS — D64.9 ANEMIA, UNSPECIFIED TYPE: ICD-10-CM

## 2025-07-29 DIAGNOSIS — I74.3 EMBOLISM AND THROMBOSIS OF ARTERIES OF LOWER EXTREMITY (HCC): ICD-10-CM

## 2025-07-29 DIAGNOSIS — E78.5 HYPERLIPIDEMIA, UNSPECIFIED HYPERLIPIDEMIA TYPE: ICD-10-CM

## 2025-07-29 DIAGNOSIS — M79.A22 NONTRAUMATIC COMPARTMENT SYNDROME OF LEFT LOWER EXTREMITY: ICD-10-CM

## 2025-07-29 DIAGNOSIS — I71.43 INFRARENAL ABDOMINAL AORTIC ANEURYSM (AAA) WITHOUT RUPTURE: Primary | ICD-10-CM

## 2025-07-29 DIAGNOSIS — N18.31 STAGE 3A CHRONIC KIDNEY DISEASE (HCC): ICD-10-CM

## 2025-07-29 DIAGNOSIS — I10 PRIMARY HYPERTENSION: ICD-10-CM

## 2025-07-29 DIAGNOSIS — E11.42 DIABETIC POLYNEUROPATHY ASSOCIATED WITH TYPE 2 DIABETES MELLITUS (HCC): ICD-10-CM

## 2025-07-29 DIAGNOSIS — Z78.9 STATIN INTOLERANCE: ICD-10-CM

## 2025-07-29 DIAGNOSIS — J44.9 CHRONIC OBSTRUCTIVE PULMONARY DISEASE, UNSPECIFIED COPD TYPE (HCC): ICD-10-CM

## 2025-07-29 PROBLEM — T81.31XA RUPTURE OF OPERATION WOUND: Status: RESOLVED | Noted: 2025-07-21 | Resolved: 2025-07-29

## 2025-07-29 PROBLEM — R60.0 LEG EDEMA, LEFT: Status: RESOLVED | Noted: 2025-07-21 | Resolved: 2025-07-29

## 2025-07-29 PROBLEM — L97.821: Status: RESOLVED | Noted: 2025-07-21 | Resolved: 2025-07-29

## 2025-07-29 PROBLEM — Z01.818 PREOP TESTING: Status: RESOLVED | Noted: 2025-06-27 | Resolved: 2025-07-29

## 2025-07-29 PROCEDURE — 99214 OFFICE O/P EST MOD 30 MIN: CPT | Performed by: FAMILY MEDICINE

## 2025-07-29 PROCEDURE — G2211 COMPLEX E/M VISIT ADD ON: HCPCS | Performed by: FAMILY MEDICINE

## 2025-07-29 RX ORDER — HYDROCODONE BITARTRATE AND ACETAMINOPHEN 5; 325 MG/1; MG/1
1 TABLET ORAL EVERY 4 HOURS PRN
Qty: 7 TABLET | Refills: 0 | Status: SHIPPED | OUTPATIENT
Start: 2025-07-29

## 2025-07-29 RX ORDER — FLUTICASONE PROPIONATE AND SALMETEROL 500; 50 UG/1; UG/1
1 POWDER RESPIRATORY (INHALATION) 2 TIMES DAILY
Qty: 60 EACH | Refills: 3 | Status: SHIPPED | OUTPATIENT
Start: 2025-07-29 | End: 2025-08-28

## 2025-07-29 RX ORDER — GABAPENTIN 400 MG/1
CAPSULE ORAL
COMMUNITY
Start: 2025-07-29

## 2025-07-30 LAB
CREATININE, RANDOM URINE: 88 MG/DL (ref 20–275)
MICROALBUMIN/CREATININE RATIO, RANDOM URINE: 19 MG/G CREAT
MICROALBUMIN: 1.7 MG/DL

## 2025-08-24 DIAGNOSIS — N18.31 TYPE 2 DIABETES MELLITUS WITH STAGE 3A CHRONIC KIDNEY DISEASE, WITHOUT LONG-TERM CURRENT USE OF INSULIN (HCC): ICD-10-CM

## 2025-08-24 DIAGNOSIS — E11.22 TYPE 2 DIABETES MELLITUS WITH STAGE 3A CHRONIC KIDNEY DISEASE, WITHOUT LONG-TERM CURRENT USE OF INSULIN (HCC): ICD-10-CM

## (undated) DEVICE — 3 ML SYRINGE LUER-LOCK TIP: Brand: MONOJECT

## (undated) DEVICE — EXOFIN PRECISION PEN HIGH VISCOSITY TOPICAL SKIN ADHESIVE: Brand: EXOFIN PRECISION PEN, 1G

## (undated) DEVICE — CONTAINER,SPECIMEN,OR STERILE,4OZ: Brand: MEDLINE

## (undated) DEVICE — DECANTER BAG 9": Brand: MEDLINE INDUSTRIES, INC.

## (undated) DEVICE — DRAPE,T,LIMB,BILATERAL,STERILE: Brand: MEDLINE

## (undated) DEVICE — SUT PROL 6-0 30IN C-1 NABSRB BLU 13MM 3/8 CIR

## (undated) DEVICE — ANTIBACTERIAL VIOLET BRAIDED (POLYGLACTIN 910), SYNTHETIC ABSORBABLE SUTURE: Brand: COATED VICRYL

## (undated) DEVICE — GAMMEX® PI HYBRID SIZE 8, STERILE POWDER-FREE SURGICAL GLOVE, POLYISOPRENE AND NEOPRENE BLEND: Brand: GAMMEX

## (undated) DEVICE — BANDAGE,COHESIVE,TAN,4X5YD,LF,STRL: Brand: MEDLINE

## (undated) DEVICE — SPONGE,LAP,18"X18",STD,XR,ST,5/PK,40PK/C: Brand: MEDLINE

## (undated) DEVICE — TOWEL,OR,DSP,ST,WHITE,DLX,2/PK,40PK/CS: Brand: MEDLINE

## (undated) DEVICE — SUT VCRL 5-0 18IN PS-2 ABSRB UD L19MM 1/2 CIR

## (undated) DEVICE — 3M™ STERI-STRIP™ REINFORCED ADHESIVE SKIN CLOSURES, R1548, 1 IN X 5 IN (25 MM X 125 MM), 4 STRIPS/ENVELOPE: Brand: 3M™ STERI-STRIP™

## (undated) DEVICE — SOLUTION IRRIG 1000ML 0.9% NACL USP BTL

## (undated) DEVICE — SYRINGE MED 20ML STD CLR PLAS LL TIP N CTRL

## (undated) DEVICE — 3M™ TEGADERM™ TRANSPARENT FILM DRESSING FRAME STYLE, 1626W, 4 IN X 4-3/4 IN (10 CM X 12 CM), 50/CT 4CT/CASE: Brand: 3M™ TEGADERM™

## (undated) DEVICE — HEMOSTAT KIT SURGIFLO THROM 8ML

## (undated) DEVICE — SUT VCRL 2-0 36IN CT-1 ABSRB UD L36MM 1/2 CIR

## (undated) DEVICE — BNDG,ELSTC,MATRIX,STRL,4"X5YD,LF,HOOK&LP: Brand: MEDLINE

## (undated) DEVICE — HANDLE SUR BLU PLAS LT FLX SLIP ON ST DISP

## (undated) DEVICE — SUT PROL 5-0 36IN C-1 NABSRB BLU 13MM 3/8 CIR

## (undated) DEVICE — SUT PERMA- 2-0 30IN NABSRB BLK TIE SILK

## (undated) DEVICE — GAUZE,SPONGE,USP,4"X4",12PLY,STRL,10/PK: Brand: MEDLINE INDUSTRIES, INC.

## (undated) DEVICE — FOGARTY ARTERIAL EMBOLECTOMY CATHETER 4F 40CM: Brand: FOGARTY

## (undated) DEVICE — 3M™ IOBAN™ 2 ANTIMICROBIAL INCISE DRAPE 6650EZ: Brand: IOBAN™ 2

## (undated) DEVICE — INTENDED TO BE USED TO OCCLUDE, RETRACT AND IDENTIFY ARTERIES, VEINS, TENDONS AND NERVES IN SURGICAL PROCEDURES: Brand: STERION®  VESSEL LOOP

## (undated) DEVICE — BANDAGE,GAUZE,4.5"X4.1YD,STERILE,LF: Brand: MEDLINE

## (undated) DEVICE — PAD,ABDOMINAL,8"X7.5",STERILE,LF,1/PK: Brand: MEDLINE

## (undated) DEVICE — SLIM BODY SKIN STAPLER: Brand: APPOSE ULC

## (undated) DEVICE — SUT PROL 7-0 30IN BV-1 NABSRB BLU 9.3MM 3/8 C

## (undated) DEVICE — SUT PERMA- 4-0 18IN NABSRB BLK TIE SILK

## (undated) DEVICE — SUT ETHLN 2-0 18IN FS NABSRB BLK 26MM 3/8 CIR

## (undated) DEVICE — SYRINGE MED 10ML LL TIP W/O SFTY DISP

## (undated) NOTE — LETTER
[facility] 3W/SW  [address] [facility], [place] [address]  [phone]    Blood Transfusion Consent    In the course of your treatment, it may become necessary to administer a transfusion of blood or blood components. This form provides basic information concerning this procedure and, if signed by you, authorizes its administration. By signing this form, you agree that all of your questions about the administration of blood or blood products have been answered by the ordering medical professional or designee.    Description of Procedure  Blood is introduced into one of your veins, commonly in the arm, using a sterilized disposable needle. The amount of blood transfused, and whether the transfusion will be of blood or blood components is a judgement the physician will make based on your particular needs.    Risks  The transfusion is a common procedure of low risk.  MINOR AND TEMPORARY REACTIONS ARE NOT UNCOMMON, including a slight bruise, swelling or local reaction in the area where the needle pierces your skin, or a nonserious reaction to the transfused material itself, including headache, fever or mild skin reaction, such as rash.  Serious reactions are possible, though very unlikely, and include severe allergic reaction (shock) and destruction (hemolysis) of transfused blood cells.  Infectious diseases which are known to be transmitted by blood transfusion include certain types of viral Hepatitis(liver infection from a virus), Human Immunodeficiency Virus (HIV-1,2) infection, a viral infection known to cause Acquired Immunodeficiency Syndrome (AIDS), as well as certain other bacterial, viral, and parasitic diseases. While a minimal risk of acquiring an infectious disease from transfused blood exists, in accordance with the Federal and State law, all due care has been taken in donor selection and testing to avoid transmission of disease.    Alternatives  If loss of blood poses serious threats during your  treatment, THERE IS NO EFFECTIVE ALTERNATIVE TO BLOOD TRANSFUSION. However, if you have any further questions on this matter, your provider will fully explain the alternatives to you if it has not already been done.    I, ______________________________, have read/had read to me the above. I understand the matters bearing on the decision whether or not to authorize a transfusion of blood or blood components. I have no questions which have not been answered to my full satisfaction. I hereby consent to such transfusion as my physician may deem necessary or advisable in the course of my treatment.    ______________________________________________                    ___________________________  (Signature of Patient or Responsible party in case of minor,                 (Printed Name of Patient or incompetent, or unconscious patient)              Responsible Party)    ___________________________               _____________________  (Relationship to Patient if not self)                                    (Date and Time)    __________________________                                                           ______________________              (Signature of Witness)               (Printed Name of Witness)     Language line ()    Telephone/Verbal/Video Consent    __________________________                     ____________________  (Signature of 2nd Witness           (Printed Name of 2nd  Telephone/Verbal/Video Consent)           Witness)    Patient Name: Mandi Moran     : 1955                 Printed: 2025     Medical Record #: [id number]      Rev: 2023

## (undated) NOTE — LETTER
[facility]  Administration of Anesthesia  I, Mandi Moran agree to be cared for by a physician anesthesiologist alone and/or with a nurse anesthetist, who is specially trained to monitor me and give me medicine to put me to sleep or keep me comfortable during my procedure    I understand that my anesthesiologist and/or anesthetist is not an employee or agent of [facility] or [employer]. He or she works for [facility]    As the patient asking for anesthesia services, I agree to:  Allow the anesthesiologist (anesthesia doctor) to give me medicine and do additional procedures as necessary. Some examples are: Starting or using an “IV” to give me medicine, fluids or blood during my procedure, and having a breathing tube placed to help me breathe when I’m asleep (intubation). In the event that my heart stops working properly, I understand that my anesthesiologist will make every effort to sustain my life, unless otherwise directed by [facility] Do Not Resuscitate documents.  Tell my anesthesia doctor before my procedure:  If I am pregnant.  The last time that I ate or drank.  iii. All of the medicines I take (including prescriptions, herbal supplements, and pills I can buy without a prescription (including street drugs/illegal medications). Failure to inform my anesthesiologist about these medicines may increase my risk of anesthetic complications.  iv.If I am allergic to anything or have had a reaction to anesthesia before.  I understand how the anesthesia medicine will help me (benefits).  I understand that with any type of anesthesia medicine there are risks:  The most common risks are: nausea, vomiting, sore throat, muscle soreness, damage to my eyes, mouth, or teeth (from breathing tube placement).  Rare risks include: remembering what happened during my procedure, allergic reactions to medications, injury to my airway, heart, lungs, vision, nerves, or  muscles and in extremely rare instances death.  My doctor has explained to me other choices available to me for my care (alternatives).  Pregnant Patients (“epidural”):  I understand that the risks of having an epidural (medicine given into my back to help control pain during labor), include itching, low blood pressure, difficulty urinating, headache or slowing of the baby’s heart. Very rare risks include infection, bleeding, seizure, irregular heart rhythms and nerve injury.  Regional Anesthesia (“spinal”, “epidural”, & “nerve blocks”):  I understand that rare but potential complications include headache, bleeding, infection, seizure, irregular heart rhythms, and nerve injury.    _____________________________________________________________________________  Patient (or Representative) Signature/Relationship to Patient  Date   Time    _____________________________________________________________________________   Name (if used)    Language/Organization   Time    _____________________________________________________________________________  Nurse Anesthetist Signature     Date   Time  _____________________________________________________________________________  Anesthesiologist Signature     Date   Time  I have discussed the procedure and information above with the patient (or patient’s representative) and answered their questions. The patient or their representative has agreed to have anesthesia services.    _____________________________________________________________________________  Witness        Date   Time  I have verified that the signature is that of the patient or patient’s representative, and that it was signed before the procedure  Patient Name: Mandi Moran     : 1955                 Printed: 2025 at 3:59 PM    Medical Record #: [id number]                                            Page 1 of 1  ----------ANESTHESIA CONSENT----------

## (undated) NOTE — LETTER
[facility] [address], [place], [place]    Authorization for Surgical Operation and Procedure                               I hereby authorize Dr. Najjar, my physician and his/her assistants (if applicable), which may include medical students, residents, and/or fellows, to perform the following surgical operation/ procedure and administer such anesthesia as may be determined necessary by my physician: left lower extremity fasciotomy and right leg thrombectomy  on Mandi Moran   2.   I recognize that during the surgical operation/procedure, unforeseen conditions may necessitate additional or different procedures than those listed above.  I, therefore, further authorize and request that the above-named surgeon, assistants, or designees perform such procedures as are, in their judgment, necessary and desirable.    3.   My surgeon/physician has discussed prior to my surgery the potential benefits, risks and side effects of this procedure; the likelihood of achieving goals; and potential problems that might occur during recuperation.  They also discussed reasonable alternatives to the procedure, including risks, benefits, and side effects related to the alternatives and risks related to not receiving this procedure.  I have had all my questions answered and I acknowledge that no guarantee has been made as to the result that may be obtained.    4.   Should the need arise during my operation/procedure, which includes change of level of care prior to discharge, I also consent to the administration of blood and/or blood products.  Further, I understand that despite careful testing and screening of blood or blood products by collecting agencies, I may still be subject to ill effects as a result of receiving a blood transfusion and/or blood products.  The following are some, but not all, of the potential risks that can occur: fever and allergic reactions, hemolytic reactions, transmission of diseases  such as Hepatitis, AIDS and Cytomegalovirus (CMV) and fluid overload.  In the event that I wish to have an autologous transfusion of my own blood, or a directed donor transfusion, I will discuss this with my physician.  Check only if Refusing Blood or Blood Products  I understand refusal of blood or blood products as deemed necessary by my physician may have serious consequences to my condition to include possible death. I hereby assume responsibility for my refusal and release the hospital, its personnel, and my physicians from any responsibility for the consequences of my refusal.    o  Refuse   5.   I authorize the use of any specimen, organs, tissues, body parts or foreign objects that may be removed from my body during the operation/procedure for diagnosis, research or teaching purposes and their subsequent disposal by hospital authorities.  I also authorize the release of specimen test results and/or written reports to my treating physician on the hospital medical staff or other referring or consulting physicians involved in my care, at the discretion of the Pathologist or my treating physician.    6.   I consent to the photographing or videotaping of the operations or procedures to be performed, including appropriate portions of my body for medical, scientific, or educational purposes, provided my identity is not revealed by the pictures or by descriptive texts accompanying them.  If the procedure has been photographed/videotaped, the surgeon will obtain the original picture, image, videotape or CD.  The hospital will not be responsible for storage, release or maintenance of the picture, image, tape or CD.    7.   I consent to the presence of a  or observers in the operating room as deemed necessary by my physician or their designees.    8.   I recognize that in the event my procedure results in extended X-Ray/fluoroscopy time, I may develop a skin reaction.    9. If I have a Do Not Attempt  Resuscitation (DNAR) order in place, that status will be suspended while in the operating room, procedural suite, and during the recovery period unless otherwise explicitly stated by me (or a person authorized to consent on my behalf). The surgeon or my attending physician will determine when the applicable recovery period ends for purposes of reinstating the DNAR order.  10. Patients having a sterilization procedure: I understand that if the procedure is successful the results will be permanent and it will therefore be impossible for me to inseminate, conceive, or bear children.  I also understand that the procedure is intended to result in sterility, although the result has not been guaranteed.   11. I acknowledge that my physician has explained sedation/analgesia administration to me including the risk and benefits I consent to the administration of sedation/analgesia as may be necessary or desirable in the judgment of my physician.    I CERTIFY THAT I HAVE READ AND FULLY UNDERSTAND THE ABOVE CONSENT TO OPERATION and/or OTHER PROCEDURE.     ____________________________________  _________________________________        ______________________________  Signature of Patient    Signature of Responsible Person                Printed Name of Responsible Person                                      ____________________________________  _____________________________                ________________________________  Signature of Witness        Date  Time         Relationship to Patient    STATEMENT OF PHYSICIAN My signature below affirms that prior to the time of the procedure; I have explained to the patient and/or his/her legal representative, the risks and benefits involved in the proposed treatment and any reasonable alternative to the proposed treatment. I have also explained the risks and benefits involved in refusal of the proposed treatment and alternatives to the proposed treatment and have answered the patient's  questions. If I have a significant financial interest in a co-management agreement or a significant financial interest in any product or implant, or other significant relationship used in this procedure/surgery, I have disclosed this and had a discussion with my patient.     _____________________________________________________              _____________________________  (Signature of Physician)                                                                                         (Date)                                   (Time)  Patient Name: Mandi Moran      : 1955      Printed: 2025     Medical Record #: [id number]                                      Page 1 of 1

## (undated) NOTE — LETTER
[facility]  Administration of Anesthesia  I, Mandi Moran agree to be cared for by a physician anesthesiologist alone and/or with a nurse anesthetist, who is specially trained to monitor me and give me medicine to put me to sleep or keep me comfortable during my procedure    I understand that my anesthesiologist and/or anesthetist is not an employee or agent of [facility] or [facility]. He or she works for [facility]    As the patient asking for anesthesia services, I agree to:  Allow the anesthesiologist (anesthesia doctor) to give me medicine and do additional procedures as necessary. Some examples are: Starting or using an “IV” to give me medicine, fluids or blood during my procedure, and having a breathing tube placed to help me breathe when I’m asleep (intubation). In the event that my heart stops working properly, I understand that my anesthesiologist will make every effort to sustain my life, unless otherwise directed by [facility] Do Not Resuscitate documents.  Tell my anesthesia doctor before my procedure:  If I am pregnant.  The last time that I ate or drank.  iii. All of the medicines I take (including prescriptions, herbal supplements, and pills I can buy without a prescription (including street drugs/illegal medications). Failure to inform my anesthesiologist about these medicines may increase my risk of anesthetic complications.  iv.If I am allergic to anything or have had a reaction to anesthesia before.  I understand how the anesthesia medicine will help me (benefits).  I understand that with any type of anesthesia medicine there are risks:  The most common risks are: nausea, vomiting, sore throat, muscle soreness, damage to my eyes, mouth, or teeth (from breathing tube placement).  Rare risks include: remembering what happened during my procedure, allergic reactions to medications, injury to my airway, heart, lungs, vision, nerves, or  muscles and in extremely rare instances death.  My doctor has explained to me other choices available to me for my care (alternatives).  Pregnant Patients (“epidural”):  I understand that the risks of having an epidural (medicine given into my back to help control pain during labor), include itching, low blood pressure, difficulty urinating, headache or slowing of the baby’s heart. Very rare risks include infection, bleeding, seizure, irregular heart rhythms and nerve injury.  Regional Anesthesia (“spinal”, “epidural”, & “nerve blocks”):  I understand that rare but potential complications include headache, bleeding, infection, seizure, irregular heart rhythms, and nerve injury.    _____________________________________________________________________________  Patient (or Representative) Signature/Relationship to Patient  Date   Time    _____________________________________________________________________________   Name (if used)    Language/Organization   Time    _____________________________________________________________________________  Nurse Anesthetist Signature     Date   Time  _____________________________________________________________________________  Anesthesiologist Signature     Date   Time  I have discussed the procedure and information above with the patient (or patient’s representative) and answered their questions. The patient or their representative has agreed to have anesthesia services.    _____________________________________________________________________________  Witness        Date   Time  I have verified that the signature is that of the patient or patient’s representative, and that it was signed before the procedure  Patient Name: Mandi Moran     : 1955                 Printed: 2025 at 10:47 AM    Medical Record #: [id number]                                            Page 1 of 1  ----------ANESTHESIA CONSENT----------